# Patient Record
Sex: FEMALE | Race: WHITE | NOT HISPANIC OR LATINO | Employment: UNEMPLOYED | URBAN - NONMETROPOLITAN AREA
[De-identification: names, ages, dates, MRNs, and addresses within clinical notes are randomized per-mention and may not be internally consistent; named-entity substitution may affect disease eponyms.]

---

## 2017-01-02 ENCOUNTER — HOSPITAL ENCOUNTER (EMERGENCY)
Facility: HOSPITAL | Age: 47
Discharge: HOME | End: 2017-01-03
Attending: EMERGENCY MEDICINE | Admitting: EMERGENCY MEDICINE

## 2017-01-02 ENCOUNTER — APPOINTMENT (OUTPATIENT)
Dept: GENERAL RADIOLOGY | Facility: HOSPITAL | Age: 47
End: 2017-01-02

## 2017-01-02 DIAGNOSIS — K29.00 OTHER ACUTE GASTRITIS: Primary | ICD-10-CM

## 2017-01-02 DIAGNOSIS — R55 VASOVAGAL NEAR SYNCOPE: ICD-10-CM

## 2017-01-02 LAB
ALBUMIN SERPL-MCNC: 4.4 G/DL (ref 3.5–5)
ALBUMIN/GLOB SERPL: 1.2 G/DL (ref 1.5–2.5)
ALP SERPL-CCNC: 121 U/L (ref 46–116)
ALT SERPL W P-5'-P-CCNC: 18 U/L (ref 10–36)
AMPHET+METHAMPHET UR QL: NEGATIVE
AMYLASE SERPL-CCNC: 68 U/L (ref 28–100)
ANION GAP SERPL CALCULATED.3IONS-SCNC: 8.4 MMOL/L (ref 3.6–11.2)
AST SERPL-CCNC: 15 U/L (ref 10–30)
BARBITURATES UR QL SCN: NEGATIVE
BASOPHILS # BLD AUTO: 0.03 10*3/MM3 (ref 0–0.3)
BASOPHILS NFR BLD AUTO: 0.3 % (ref 0–2)
BENZODIAZ UR QL SCN: NEGATIVE
BILIRUB SERPL-MCNC: 0.2 MG/DL (ref 0.2–1.8)
BILIRUB UR QL STRIP: NEGATIVE
BUN BLD-MCNC: 16 MG/DL (ref 7–21)
BUN/CREAT SERPL: 22.9 (ref 7–25)
CALCIUM SPEC-SCNC: 10.2 MG/DL (ref 7.7–10)
CANNABINOIDS SERPL QL: NEGATIVE
CHLORIDE SERPL-SCNC: 104 MMOL/L (ref 99–112)
CK MB SERPL-CCNC: 0.29 NG/ML (ref 0–5)
CK MB SERPL-RTO: 0.4 % (ref 0–3)
CK SERPL-CCNC: 77 U/L (ref 24–173)
CLARITY UR: CLEAR
CO2 SERPL-SCNC: 27.6 MMOL/L (ref 24.3–31.9)
COCAINE UR QL: NEGATIVE
COLOR UR: YELLOW
CREAT BLD-MCNC: 0.7 MG/DL (ref 0.43–1.29)
DEPRECATED RDW RBC AUTO: 44.2 FL (ref 37–54)
DEVELOPER EXPIRATION DATE: NORMAL
DEVELOPER LOT NUMBER: NORMAL
EOSINOPHIL # BLD AUTO: 0.42 10*3/MM3 (ref 0–0.7)
EOSINOPHIL NFR BLD AUTO: 3.6 % (ref 0–5)
ERYTHROCYTE [DISTWIDTH] IN BLOOD BY AUTOMATED COUNT: 13.5 % (ref 11.5–14.5)
EXPIRATION DATE: NORMAL
FECAL OCCULT BLOOD SCREEN, POC: NORMAL
GFR SERPL CREATININE-BSD FRML MDRD: 91 ML/MIN/1.73
GLOBULIN UR ELPH-MCNC: 3.7 GM/DL
GLUCOSE BLD-MCNC: 208 MG/DL (ref 70–110)
GLUCOSE UR STRIP-MCNC: NEGATIVE MG/DL
HCT VFR BLD AUTO: 43.1 % (ref 37–47)
HGB BLD-MCNC: 13.8 G/DL (ref 12–16)
HGB UR QL STRIP.AUTO: NEGATIVE
IMM GRANULOCYTES # BLD: 0.02 10*3/MM3 (ref 0–0.03)
IMM GRANULOCYTES NFR BLD: 0.2 % (ref 0–0.5)
KETONES UR QL STRIP: NEGATIVE
LEUKOCYTE ESTERASE UR QL STRIP.AUTO: NEGATIVE
LIPASE SERPL-CCNC: 31 U/L (ref 13–60)
LYMPHOCYTES # BLD AUTO: 2.83 10*3/MM3 (ref 1–3)
LYMPHOCYTES NFR BLD AUTO: 24.5 % (ref 21–51)
Lab: NORMAL
MCH RBC QN AUTO: 29.6 PG (ref 27–33)
MCHC RBC AUTO-ENTMCNC: 32 G/DL (ref 33–37)
MCV RBC AUTO: 92.3 FL (ref 80–94)
METHADONE UR QL SCN: NEGATIVE
MONOCYTES # BLD AUTO: 0.89 10*3/MM3 (ref 0.1–0.9)
MONOCYTES NFR BLD AUTO: 7.7 % (ref 0–10)
MYOGLOBIN SERPL-MCNC: 21 NG/ML (ref 0–109)
NEGATIVE CONTROL: NEGATIVE
NEUTROPHILS # BLD AUTO: 7.34 10*3/MM3 (ref 1.4–6.5)
NEUTROPHILS NFR BLD AUTO: 63.7 % (ref 30–70)
NITRITE UR QL STRIP: NEGATIVE
OPIATES UR QL: NEGATIVE
OSMOLALITY SERPL CALC.SUM OF ELEC: 286.7 MOSM/KG (ref 273–305)
OXYCODONE UR QL SCN: NEGATIVE
PCP UR QL SCN: NEGATIVE
PH UR STRIP.AUTO: <=5 [PH] (ref 5–8)
PLATELET # BLD AUTO: 404 10*3/MM3 (ref 130–400)
PMV BLD AUTO: 11.1 FL (ref 6–10)
POSITIVE CONTROL: POSITIVE
POTASSIUM BLD-SCNC: 4.1 MMOL/L (ref 3.5–5.3)
PROPOXYPH UR QL: NEGATIVE
PROT SERPL-MCNC: 8.1 G/DL (ref 6–8)
PROT UR QL STRIP: NEGATIVE
RBC # BLD AUTO: 4.67 10*6/MM3 (ref 4.2–5.4)
SODIUM BLD-SCNC: 140 MMOL/L (ref 135–153)
SP GR UR STRIP: 1.02 (ref 1–1.03)
TROPONIN I SERPL-MCNC: <0.006 NG/ML
UROBILINOGEN UR QL STRIP: NORMAL
WBC NRBC COR # BLD: 11.53 10*3/MM3 (ref 4.5–12.5)

## 2017-01-02 PROCEDURE — 36415 COLL VENOUS BLD VENIPUNCTURE: CPT

## 2017-01-02 PROCEDURE — 99284 EMERGENCY DEPT VISIT MOD MDM: CPT

## 2017-01-02 PROCEDURE — 80307 DRUG TEST PRSMV CHEM ANLYZR: CPT | Performed by: EMERGENCY MEDICINE

## 2017-01-02 PROCEDURE — 82553 CREATINE MB FRACTION: CPT | Performed by: EMERGENCY MEDICINE

## 2017-01-02 PROCEDURE — 96374 THER/PROPH/DIAG INJ IV PUSH: CPT

## 2017-01-02 PROCEDURE — 80053 COMPREHEN METABOLIC PANEL: CPT | Performed by: EMERGENCY MEDICINE

## 2017-01-02 PROCEDURE — 82150 ASSAY OF AMYLASE: CPT | Performed by: EMERGENCY MEDICINE

## 2017-01-02 PROCEDURE — 82550 ASSAY OF CK (CPK): CPT | Performed by: EMERGENCY MEDICINE

## 2017-01-02 PROCEDURE — 93010 ELECTROCARDIOGRAM REPORT: CPT | Performed by: INTERNAL MEDICINE

## 2017-01-02 PROCEDURE — 83690 ASSAY OF LIPASE: CPT | Performed by: EMERGENCY MEDICINE

## 2017-01-02 PROCEDURE — 71010 HC CHEST PA OR AP: CPT

## 2017-01-02 PROCEDURE — 84484 ASSAY OF TROPONIN QUANT: CPT | Performed by: EMERGENCY MEDICINE

## 2017-01-02 PROCEDURE — 93005 ELECTROCARDIOGRAM TRACING: CPT | Performed by: EMERGENCY MEDICINE

## 2017-01-02 PROCEDURE — G0477 DRUG TEST PRESUMP OPTICAL: HCPCS | Performed by: EMERGENCY MEDICINE

## 2017-01-02 PROCEDURE — 83874 ASSAY OF MYOGLOBIN: CPT | Performed by: EMERGENCY MEDICINE

## 2017-01-02 PROCEDURE — 25010000002 ONDANSETRON PER 1 MG: Performed by: EMERGENCY MEDICINE

## 2017-01-02 PROCEDURE — 81003 URINALYSIS AUTO W/O SCOPE: CPT | Performed by: EMERGENCY MEDICINE

## 2017-01-02 PROCEDURE — 71010 XR CHEST 1 VW: CPT | Performed by: RADIOLOGY

## 2017-01-02 PROCEDURE — 96361 HYDRATE IV INFUSION ADD-ON: CPT

## 2017-01-02 PROCEDURE — 85025 COMPLETE CBC W/AUTO DIFF WBC: CPT | Performed by: EMERGENCY MEDICINE

## 2017-01-02 RX ORDER — SODIUM CHLORIDE 9 MG/ML
125 INJECTION, SOLUTION INTRAVENOUS ONCE
Status: COMPLETED | OUTPATIENT
Start: 2017-01-02 | End: 2017-01-03

## 2017-01-02 RX ORDER — ONDANSETRON 2 MG/ML
4 INJECTION INTRAMUSCULAR; INTRAVENOUS ONCE
Status: COMPLETED | OUTPATIENT
Start: 2017-01-02 | End: 2017-01-02

## 2017-01-02 RX ORDER — SODIUM CHLORIDE 0.9 % (FLUSH) 0.9 %
10 SYRINGE (ML) INJECTION AS NEEDED
Status: DISCONTINUED | OUTPATIENT
Start: 2017-01-02 | End: 2017-01-03 | Stop reason: HOSPADM

## 2017-01-02 RX ADMIN — SODIUM CHLORIDE 125 ML/HR: 9 INJECTION, SOLUTION INTRAVENOUS at 23:04

## 2017-01-02 RX ADMIN — ONDANSETRON 4 MG: 2 INJECTION, SOLUTION INTRAMUSCULAR; INTRAVENOUS at 20:42

## 2017-01-02 RX ADMIN — SODIUM CHLORIDE 500 ML: 9 INJECTION, SOLUTION INTRAVENOUS at 22:03

## 2017-01-02 RX ADMIN — SODIUM CHLORIDE 500 ML: 9 INJECTION, SOLUTION INTRAVENOUS at 20:37

## 2017-01-03 VITALS
WEIGHT: 270 LBS | SYSTOLIC BLOOD PRESSURE: 116 MMHG | RESPIRATION RATE: 20 BRPM | BODY MASS INDEX: 49.69 KG/M2 | TEMPERATURE: 98 F | HEART RATE: 84 BPM | OXYGEN SATURATION: 97 % | DIASTOLIC BLOOD PRESSURE: 63 MMHG | HEIGHT: 62 IN

## 2017-01-03 LAB
CK MB SERPL-CCNC: 0.2 NG/ML (ref 0–5)
CK MB SERPL-RTO: 0.3 % (ref 0–3)
CK SERPL-CCNC: 67 U/L (ref 24–173)
MYOGLOBIN SERPL-MCNC: 24 NG/ML (ref 0–109)
TROPONIN I SERPL-MCNC: 0.01 NG/ML

## 2017-01-03 RX ORDER — OMEPRAZOLE 20 MG/1
20 CAPSULE, DELAYED RELEASE ORAL DAILY
Qty: 30 CAPSULE | Refills: 0 | Status: SHIPPED | OUTPATIENT
Start: 2017-01-03 | End: 2017-08-07

## 2017-01-03 RX ORDER — PROMETHAZINE HYDROCHLORIDE 25 MG/1
25 SUPPOSITORY RECTAL EVERY 6 HOURS PRN
Qty: 12 SUPPOSITORY | Refills: 0 | Status: SHIPPED | OUTPATIENT
Start: 2017-01-03 | End: 2017-01-18

## 2017-01-03 RX ORDER — FAMOTIDINE 20 MG/1
20 TABLET, FILM COATED ORAL NIGHTLY
Qty: 30 TABLET | Refills: 0 | Status: SHIPPED | OUTPATIENT
Start: 2017-01-03 | End: 2017-01-18

## 2017-01-03 RX ORDER — ONDANSETRON 4 MG/1
4 TABLET, ORALLY DISINTEGRATING ORAL 4 TIMES DAILY
Qty: 15 TABLET | Refills: 0 | Status: SHIPPED | OUTPATIENT
Start: 2017-01-03 | End: 2017-01-19 | Stop reason: HOSPADM

## 2017-01-03 NOTE — ED NOTES
Pt reports that when she went to the bathroom that her stool looked dark and states she is concerned it might have been bloody. Dr Beltre made of aware of pts concerns. New orders noted     Danielle Goldstein RN  01/03/17 0812

## 2017-01-03 NOTE — ED PROVIDER NOTES
Subjective   HPI Comments: Patient comes in following a near syncopal event.  She was eating dinner out.  She had sudden severe onset mid abdominal pain and was very nauseated.  She became pale and diaphoretic.  She had near syncope.  She had pounding palpitations and chest tightness. Sometimes have almost completely resolved.  She has not had similar symptoms in the past.  She is currently on oral antibiotics (nitrofurantoin) for a UTI.    Patient is a 46 y.o. female presenting with abdominal pain.   History provided by:  Patient  Abdominal Pain   Pain quality: cramping, sharp and stabbing    Pain radiates to:  Does not radiate  Pain severity:  Severe  Onset quality:  Sudden  Progression:  Partially resolved  Chronicity:  New  Context: eating    Context: not alcohol use, not awakening from sleep, not diet changes, not laxative use, not medication withdrawal, not previous surgeries, not recent illness, not recent sexual activity, not recent travel, not retching, not sick contacts, not suspicious food intake and not trauma    Relieved by:  Nothing  Worsened by:  Eating  Ineffective treatments:  None tried  Associated symptoms: chest pain, nausea and vomiting    Associated symptoms: no anorexia, no belching, no chills, no constipation, no cough, no diarrhea, no dysuria, no fatigue, no fever, no flatus, no hematemesis, no hematochezia, no hematuria, no melena, no shortness of breath, no sore throat, no vaginal bleeding and no vaginal discharge    Risk factors: obesity    Risk factors: no alcohol abuse, no aspirin use, not elderly, has not had multiple surgeries, no NSAID use, not pregnant and no recent hospitalization        Review of Systems   Constitutional: Positive for diaphoresis. Negative for chills, fatigue and fever.   HENT: Negative.  Negative for sore throat.    Eyes: Negative.  Negative for visual disturbance.   Respiratory: Positive for chest tightness. Negative for cough and shortness of breath.     Cardiovascular: Positive for chest pain and palpitations. Negative for leg swelling.   Gastrointestinal: Positive for abdominal pain, nausea and vomiting. Negative for abdominal distention, anorexia, constipation, diarrhea, flatus, hematemesis, hematochezia and melena.   Endocrine: Negative.    Genitourinary: Negative.  Negative for dysuria, hematuria, vaginal bleeding and vaginal discharge.   Musculoskeletal: Negative.    Skin: Positive for color change.   Allergic/Immunologic: Negative.    Neurological: Positive for dizziness, weakness and light-headedness.   Hematological: Negative.    Psychiatric/Behavioral: Negative.    All other systems reviewed and are negative.      Past Medical History   Diagnosis Date   • Anxiety    • Arthritis    • Asthma    • Depression    • Depression    • Hyperlipidemia    • Hypertension    • Mitral valve regurgitation    • PONV (postoperative nausea and vomiting)    • Sleep apnea        Allergies   Allergen Reactions   • Amlodipine GI Intolerance and Arrhythmia   • Morphine And Related Itching   • Nifedipine GI Intolerance and Arrhythmia     Adalat, procardia   • Diazepam Anxiety   • Midazolam Anxiety   • Sulfa Antibiotics Rash       Past Surgical History   Procedure Laterality Date   •  section     • Appendectomy     • Foot surgery     • Hysterectomy     • [place] tooth extraction     • Mouth surgery     • Tubal abdominal ligation     • Diagnostic laparoscopy N/A 10/14/2016     Procedure: DIAGNOSTIC LAPAROSCOPY POSSIBLE RIGHT SALPINGOOPHORECTOMY;  Surgeon: Rory Owens DO;  Location: [facility];  Service:    • Trachelectomy N/A 10/14/2016     Procedure: TRACHELECTOMY VAGINAL;  Surgeon: Rory Owens DO;  Location: [facility] OR;  Service:    • Umbilical hernia repair N/A 10/14/2016     Procedure: UMBILICAL HERNIA REPAIR;  Surgeon: Spike Porter MD;  Location: [facility] OR;  Service:    • Colonoscopy     • Cardiac catheterization     • Umbilical hernia  repair N/A 12/9/2016     Procedure: UMBILICAL HERNIA REPAIR;  Surgeon: Spike Porter MD;  Location: [facility];  Service:        Family History   Problem Relation Age of Onset   • No Known Problems Mother    • No Known Problems Father    • No Known Problems Sister    • No Known Problems Brother    • No Known Problems Son    • No Known Problems Daughter    • No Known Problems Maternal Grandmother    • No Known Problems Maternal Grandfather    • No Known Problems Paternal Grandmother    • No Known Problems Paternal Grandfather    • No Known Problems Cousin    • Rheum arthritis Neg Hx    • Osteoarthritis Neg Hx    • Asthma Neg Hx    • Diabetes Neg Hx    • Heart failure Neg Hx    • Hyperlipidemia Neg Hx    • Hypertension Neg Hx    • Migraines Neg Hx    • Rashes / Skin problems Neg Hx    • Seizures Neg Hx    • Stroke Neg Hx    • Thyroid disease Neg Hx        Social History     Social History   • Marital status:      Spouse name: N/A   • Number of children: N/A   • Years of education: N/A     Social History Main Topics   • Smoking status: Former Smoker     Packs/day: 0.50     Years: 10.00     Quit date: 2014   • Smokeless tobacco: Never Used   • Alcohol use No      Comment: not often   • Drug use: No   • Sexual activity: Defer     Other Topics Concern   • None     Social History Narrative           Objective   Physical Exam   Constitutional: She is oriented to person, place, and time. She appears well-developed and well-nourished. No distress.   HENT:   Head: Normocephalic and atraumatic.   Nose: Nose normal.   oist mucous membranes, airway widely patent.   Eyes: Conjunctivae and EOM are normal. Pupils are equal, round, and reactive to light. Right eye exhibits no discharge. Left eye exhibits no discharge. No scleral icterus.   Neck: Normal range of motion. Neck supple. No tracheal deviation present.   Cardiovascular: Normal rate, regular rhythm, normal heart sounds and intact distal pulses.  Exam reveals no  gallop and no friction rub.    No murmur heard.  Pulmonary/Chest: Effort normal and breath sounds normal. No stridor. No respiratory distress. She has no wheezes. She has no rales. She exhibits no tenderness.   Abdominal: Soft. Bowel sounds are normal. She exhibits no distension and no mass. There is no tenderness. There is no guarding.   Musculoskeletal: Normal range of motion. She exhibits no tenderness or deformity.   Neurological: She is alert and oriented to person, place, and time. She exhibits normal muscle tone. Coordination normal.   Skin: Skin is warm and dry. No pallor.   Psychiatric: Her behavior is normal. Judgment and thought content normal.   anxious   Nursing note and vitals reviewed.      Procedures         ED Course  ED Course   Value Comment By Time   ECG 12 Lead 12-lead EKG performed at 2006 hrs.  Interpreted at 2006 hrs.  Sinus rhythm.  95 bpm.  IL interval 157.  QRS duration 89.  .  No pathologic blocks.  No apparent dysrhythmia.  No ST segment deviation diagnostic for acute ischemia/infarct. Ashish Beltre MD 01/03 0018    Patient hemodynamically stable and neurologically intact while in the emergency department.  No results diagnostic for acute coronary syndrome. Ashish Beltre MD 01/03 0025      XR Chest 1 View   ED Interpretation   Single portable AP chest   My read   No apparent acute disease.        Labs Reviewed   COMPREHENSIVE METABOLIC PANEL - Abnormal; Notable for the following:        Result Value    Glucose 208 (*)     Calcium 10.2 (*)     Total Protein 8.1 (*)     Alkaline Phosphatase 121 (*)     A/G Ratio 1.2 (*)     All other components within normal limits   CBC WITH AUTO DIFFERENTIAL - Abnormal; Notable for the following:     MCHC 32.0 (*)     MPV 11.1 (*)     Platelets 404 (*)     Neutrophils, Absolute 7.34 (*)     All other components within normal limits   AMYLASE - Normal   LIPASE - Normal   URINALYSIS W/ CULTURE IF INDICATED - Normal    Narrative:      Urine microscopic not indicated.   CK - Normal   MYOGLOBIN, SERUM - Normal   CK MB - Normal   TROPONIN (IN-HOUSE) - Normal    Narrative:     Ultra Troponin I Reference Range:         <=0.039 ng/mL: Negative    0.04-0.779 ng/mL: Indeterminate Range. Suspicious of MI.  Clinical correlation required.       >=0.78  ng/mL: Consistent with myocardial injury.  Clinical correlation required.   URINE DRUG SCREEN - Normal    Narrative:     Negative Thresholds For Drugs Screened:                  Amphetamines              1000 ng/ml               Barbiturates               200 ng/ml               Benzodiazepines            200 ng/ml              Cocaine                    300 ng/ml              Methadone                  300 ng/ml              Opiates                    300 ng/ml               Phencyclidine               25 ng/ml               Propoxyphene               300 ng/ml              THC                         50 ng/ml    The reference range for all drugs tested is negative. This report includes final unconfirmed qualitative results to be used for medical treatment purposes only. Unconfirmed results must not be used for non-medical purposes such as employment or legal testing. Clinical consideration should be applied to any drug of abuse test, especially when unconfirmed quantitative results are used.     OXYCODONE, URINE - Normal    Narrative:     Oxycodone Screen Detects:    Oxycodone          100 ng/mL    Hydrocodone       1562 ng/mL    Codeine          13432 ng/mL    Dihydrocodeine   31487 ng/mL    Ethylmorphine    22890 ng/mL    Hydromorphone    89381 ng/mL    Oxymorphone      1562  ng/mL    [place]         58927 ng/mL   OSMOLALITY, CALCULATED - Normal   CKMB INDEX CALCULATION - Normal   CK - Normal   MYOGLOBIN, SERUM - Normal   CK MB - Normal   TROPONIN (IN-HOUSE) - Normal    Narrative:     Ultra Troponin I Reference Range:         <=0.039 ng/mL: Negative    0.04-0.779 ng/mL: Indeterminate Range. Suspicious of  MI.  Clinical correlation required.       >=0.78  ng/mL: Consistent with myocardial injury.  Clinical correlation required.   CKMB INDEX CALCULATION - Normal   POCT OCCULT BLOOD STOOL - Normal   CBC AND DIFFERENTIAL    Narrative:     The following orders were created for panel order CBC & Differential.  Procedure                               Abnormality         Status                     ---------                               -----------         ------                     CBC Auto Differential[[id number]]         Abnormal            Final result                 Please view results for these tests on the individual orders.        Medication List      START taking these medications          famotidine 20 MG tablet   Commonly known as:  PEPCID   Take 1 tablet by mouth Every Night.       omeprazole 20 MG capsule   Commonly known as:  priLOSEC   Take 1 capsule by mouth Daily.       ondansetron ODT 4 MG disintegrating tablet   Commonly known as:  ZOFRAN-ODT   Take 1 tablet by mouth 4 (Four) Times a Day.       promethazine 25 MG suppository   Commonly known as:  PHENERGAN   Insert 1 suppository into the rectum Every 6 (Six) Hours As Needed for   vomiting.         CONTINUE taking these medications          albuterol 108 (90 BASE) MCG/ACT inhaler   Commonly known as:  PROVENTIL HFA;VENTOLIN HFA       aspirin 81 MG EC tablet       atorvastatin 20 MG tablet   Commonly known as:  LIPITOR       cetirizine 10 MG tablet   Commonly known as:  zyrTEC       FLUoxetine 20 MG capsule   Commonly known as:  PROzac       ibuprofen 800 MG tablet   Commonly known as:  ADVIL,MOTRIN   Take 1 tablet by mouth 3 (Three) Times a Day.       lisinopril 20 MG tablet   Commonly known as:  PRINIVIL,ZESTRIL       lisinopril-hydrochlorothiazide 20-25 MG per tablet   Commonly known as:  PRINZIDE,ZESTORETIC       metoprolol succinate XL 50 MG 24 hr tablet   Commonly known as:  TOPROL-XL       montelukast 10 MG tablet   Commonly known as:  SINGULAIR        nitrofurantoin (macrocrystal-monohydrate) 100 MG capsule   Commonly known as:  MACROBID   Take 1 capsule by mouth 2 (Two) Times a Day.       phenazopyridine 200 MG tablet   Commonly known as:  PYRIDIUM   Take 1 tablet by mouth 3 (Three) Times a Day As Needed for bladder spasms.         simvastatin 20 MG tablet   Commonly known as:  ZOCOR                     MDM  Number of Diagnoses or Management Options  Other acute gastritis: new and requires workup  Vasovagal near syncope: new and requires workup     Amount and/or Complexity of Data Reviewed  Clinical lab tests: ordered and reviewed  Independent visualization of images, tracings, or specimens: yes    Risk of Complications, Morbidity, and/or Mortality  Presenting problems: high  Diagnostic procedures: moderate  Management options: moderate    Patient Progress  Patient progress: stable      Final diagnoses:   Other acute gastritis   Vasovagal near syncope            Ashish Beltre MD  01/03/17 0031

## 2017-01-18 ENCOUNTER — APPOINTMENT (OUTPATIENT)
Dept: GENERAL RADIOLOGY | Facility: HOSPITAL | Age: 47
End: 2017-01-18

## 2017-01-18 ENCOUNTER — HOSPITAL ENCOUNTER (OUTPATIENT)
Facility: HOSPITAL | Age: 47
Setting detail: OBSERVATION
Discharge: HOME | End: 2017-01-19
Attending: EMERGENCY MEDICINE | Admitting: INTERNAL MEDICINE

## 2017-01-18 DIAGNOSIS — R07.9 CHEST PAIN, UNSPECIFIED TYPE: Primary | ICD-10-CM

## 2017-01-18 LAB
ALBUMIN SERPL-MCNC: 4.4 G/DL (ref 3.5–5)
ALBUMIN/GLOB SERPL: 1.3 G/DL (ref 1.5–2.5)
ALP SERPL-CCNC: 100 U/L (ref 46–116)
ALT SERPL W P-5'-P-CCNC: 22 U/L (ref 10–36)
ANION GAP SERPL CALCULATED.3IONS-SCNC: 9 MMOL/L (ref 3.6–11.2)
AST SERPL-CCNC: 22 U/L (ref 10–30)
BASOPHILS # BLD AUTO: 0.02 10*3/MM3 (ref 0–0.3)
BASOPHILS NFR BLD AUTO: 0.2 % (ref 0–2)
BILIRUB SERPL-MCNC: 0.3 MG/DL (ref 0.2–1.8)
BUN BLD-MCNC: 11 MG/DL (ref 7–21)
BUN/CREAT SERPL: 17.2 (ref 7–25)
CALCIUM SPEC-SCNC: 9.6 MG/DL (ref 7.7–10)
CHLORIDE SERPL-SCNC: 102 MMOL/L (ref 99–112)
CK MB SERPL-CCNC: 0.31 NG/ML (ref 0–5)
CK MB SERPL-RTO: 0.4 % (ref 0–3)
CK SERPL-CCNC: 76 U/L (ref 24–173)
CO2 SERPL-SCNC: 28 MMOL/L (ref 24.3–31.9)
CREAT BLD-MCNC: 0.64 MG/DL (ref 0.43–1.29)
DEPRECATED RDW RBC AUTO: 44.2 FL (ref 37–54)
EOSINOPHIL # BLD AUTO: 0.31 10*3/MM3 (ref 0–0.7)
EOSINOPHIL NFR BLD AUTO: 3 % (ref 0–5)
ERYTHROCYTE [DISTWIDTH] IN BLOOD BY AUTOMATED COUNT: 13.5 % (ref 11.5–14.5)
GFR SERPL CREATININE-BSD FRML MDRD: 101 ML/MIN/1.73
GLOBULIN UR ELPH-MCNC: 3.4 GM/DL
GLUCOSE BLD-MCNC: 132 MG/DL (ref 70–110)
HCT VFR BLD AUTO: 41.1 % (ref 37–47)
HGB BLD-MCNC: 13.8 G/DL (ref 12–16)
HOLD SPECIMEN: NORMAL
HOLD SPECIMEN: NORMAL
IMM GRANULOCYTES # BLD: 0.03 10*3/MM3 (ref 0–0.03)
IMM GRANULOCYTES NFR BLD: 0.3 % (ref 0–0.5)
LYMPHOCYTES # BLD AUTO: 2.83 10*3/MM3 (ref 1–3)
LYMPHOCYTES NFR BLD AUTO: 27 % (ref 21–51)
MCH RBC QN AUTO: 30.4 PG (ref 27–33)
MCHC RBC AUTO-ENTMCNC: 33.6 G/DL (ref 33–37)
MCV RBC AUTO: 90.5 FL (ref 80–94)
MONOCYTES # BLD AUTO: 0.94 10*3/MM3 (ref 0.1–0.9)
MONOCYTES NFR BLD AUTO: 9 % (ref 0–10)
NEUTROPHILS # BLD AUTO: 6.34 10*3/MM3 (ref 1.4–6.5)
NEUTROPHILS NFR BLD AUTO: 60.5 % (ref 30–70)
OSMOLALITY SERPL CALC.SUM OF ELEC: 278.8 MOSM/KG (ref 273–305)
PLATELET # BLD AUTO: 374 10*3/MM3 (ref 130–400)
PMV BLD AUTO: 11.1 FL (ref 6–10)
POTASSIUM BLD-SCNC: 4 MMOL/L (ref 3.5–5.3)
PROT SERPL-MCNC: 7.8 G/DL (ref 6–8)
RBC # BLD AUTO: 4.54 10*6/MM3 (ref 4.2–5.4)
SODIUM BLD-SCNC: 139 MMOL/L (ref 135–153)
TROPONIN I SERPL-MCNC: <0.006 NG/ML
WBC NRBC COR # BLD: 10.47 10*3/MM3 (ref 4.5–12.5)
WHOLE BLOOD HOLD SPECIMEN: NORMAL

## 2017-01-18 PROCEDURE — 96374 THER/PROPH/DIAG INJ IV PUSH: CPT

## 2017-01-18 PROCEDURE — 84484 ASSAY OF TROPONIN QUANT: CPT | Performed by: EMERGENCY MEDICINE

## 2017-01-18 PROCEDURE — G0378 HOSPITAL OBSERVATION PER HR: HCPCS

## 2017-01-18 PROCEDURE — 71010 XR CHEST 1 VW: CPT | Performed by: RADIOLOGY

## 2017-01-18 PROCEDURE — 99284 EMERGENCY DEPT VISIT MOD MDM: CPT

## 2017-01-18 PROCEDURE — 93005 ELECTROCARDIOGRAM TRACING: CPT

## 2017-01-18 PROCEDURE — 25010000002 ONDANSETRON PER 1 MG: Performed by: EMERGENCY MEDICINE

## 2017-01-18 PROCEDURE — 84484 ASSAY OF TROPONIN QUANT: CPT | Performed by: PHYSICIAN ASSISTANT

## 2017-01-18 PROCEDURE — 96372 THER/PROPH/DIAG INJ SC/IM: CPT

## 2017-01-18 PROCEDURE — 84484 ASSAY OF TROPONIN QUANT: CPT | Performed by: INTERNAL MEDICINE

## 2017-01-18 PROCEDURE — 71010 HC CHEST PA OR AP: CPT

## 2017-01-18 PROCEDURE — 80053 COMPREHEN METABOLIC PANEL: CPT | Performed by: PHYSICIAN ASSISTANT

## 2017-01-18 PROCEDURE — 82553 CREATINE MB FRACTION: CPT | Performed by: EMERGENCY MEDICINE

## 2017-01-18 PROCEDURE — 25010000002 ENOXAPARIN PER 10 MG: Performed by: INTERNAL MEDICINE

## 2017-01-18 PROCEDURE — 93010 ELECTROCARDIOGRAM REPORT: CPT | Performed by: INTERNAL MEDICINE

## 2017-01-18 PROCEDURE — 82550 ASSAY OF CK (CPK): CPT | Performed by: EMERGENCY MEDICINE

## 2017-01-18 PROCEDURE — 85025 COMPLETE CBC W/AUTO DIFF WBC: CPT | Performed by: PHYSICIAN ASSISTANT

## 2017-01-18 RX ORDER — MONTELUKAST SODIUM 10 MG/1
10 TABLET ORAL DAILY
Status: DISCONTINUED | OUTPATIENT
Start: 2017-01-19 | End: 2017-01-19 | Stop reason: HOSPADM

## 2017-01-18 RX ORDER — FLUOXETINE HYDROCHLORIDE 20 MG/1
40 CAPSULE ORAL 2 TIMES DAILY
Status: DISCONTINUED | OUTPATIENT
Start: 2017-01-18 | End: 2017-01-19 | Stop reason: HOSPADM

## 2017-01-18 RX ORDER — CETIRIZINE HYDROCHLORIDE 10 MG/1
10 TABLET ORAL DAILY
Status: DISCONTINUED | OUTPATIENT
Start: 2017-01-19 | End: 2017-01-19 | Stop reason: HOSPADM

## 2017-01-18 RX ORDER — ASPIRIN 81 MG/1
81 TABLET ORAL DAILY
Status: DISCONTINUED | OUTPATIENT
Start: 2017-01-18 | End: 2017-01-19 | Stop reason: HOSPADM

## 2017-01-18 RX ORDER — ALBUTEROL SULFATE 2.5 MG/3ML
2.5 SOLUTION RESPIRATORY (INHALATION) EVERY 4 HOURS PRN
Status: DISCONTINUED | OUTPATIENT
Start: 2017-01-18 | End: 2017-01-19 | Stop reason: HOSPADM

## 2017-01-18 RX ORDER — METOPROLOL SUCCINATE 50 MG/1
50 TABLET, EXTENDED RELEASE ORAL DAILY
Status: CANCELLED | OUTPATIENT
Start: 2017-01-19

## 2017-01-18 RX ORDER — IBUPROFEN 800 MG/1
800 TABLET ORAL 3 TIMES DAILY
Status: CANCELLED | OUTPATIENT
Start: 2017-01-18

## 2017-01-18 RX ORDER — LISINOPRIL 10 MG/1
40 TABLET ORAL
Status: DISCONTINUED | OUTPATIENT
Start: 2017-01-18 | End: 2017-01-19 | Stop reason: HOSPADM

## 2017-01-18 RX ORDER — ATORVASTATIN CALCIUM 20 MG/1
20 TABLET, FILM COATED ORAL DAILY
Status: CANCELLED | OUTPATIENT
Start: 2017-01-19

## 2017-01-18 RX ORDER — ONDANSETRON 4 MG/1
4 TABLET, ORALLY DISINTEGRATING ORAL 4 TIMES DAILY
Status: DISCONTINUED | OUTPATIENT
Start: 2017-01-18 | End: 2017-01-19 | Stop reason: HOSPADM

## 2017-01-18 RX ORDER — SODIUM CHLORIDE 0.9 % (FLUSH) 0.9 %
10 SYRINGE (ML) INJECTION AS NEEDED
Status: DISCONTINUED | OUTPATIENT
Start: 2017-01-18 | End: 2017-01-19 | Stop reason: HOSPADM

## 2017-01-18 RX ORDER — ATORVASTATIN CALCIUM 20 MG/1
20 TABLET, FILM COATED ORAL NIGHTLY
Status: DISCONTINUED | OUTPATIENT
Start: 2017-01-18 | End: 2017-01-19 | Stop reason: HOSPADM

## 2017-01-18 RX ORDER — ACETAMINOPHEN 325 MG/1
650 TABLET ORAL EVERY 4 HOURS PRN
Status: DISCONTINUED | OUTPATIENT
Start: 2017-01-18 | End: 2017-01-19 | Stop reason: HOSPADM

## 2017-01-18 RX ORDER — ASPIRIN 81 MG/1
324 TABLET, CHEWABLE ORAL ONCE
Status: COMPLETED | OUTPATIENT
Start: 2017-01-18 | End: 2017-01-18

## 2017-01-18 RX ORDER — ASPIRIN 81 MG/1
81 TABLET ORAL DAILY
Status: CANCELLED | OUTPATIENT
Start: 2017-01-19

## 2017-01-18 RX ORDER — ONDANSETRON 2 MG/ML
4 INJECTION INTRAMUSCULAR; INTRAVENOUS ONCE
Status: COMPLETED | OUTPATIENT
Start: 2017-01-18 | End: 2017-01-18

## 2017-01-18 RX ORDER — LISINOPRIL 10 MG/1
20 TABLET ORAL DAILY
Status: CANCELLED | OUTPATIENT
Start: 2017-01-19

## 2017-01-18 RX ORDER — ACETAMINOPHEN 325 MG/1
TABLET ORAL
Status: COMPLETED
Start: 2017-01-18 | End: 2017-01-18

## 2017-01-18 RX ORDER — LISINOPRIL AND HYDROCHLOROTHIAZIDE 25; 20 MG/1; MG/1
1 TABLET ORAL DAILY
Status: CANCELLED | OUTPATIENT
Start: 2017-01-19

## 2017-01-18 RX ORDER — PANTOPRAZOLE SODIUM 40 MG/1
40 TABLET, DELAYED RELEASE ORAL
Status: DISCONTINUED | OUTPATIENT
Start: 2017-01-19 | End: 2017-01-19 | Stop reason: HOSPADM

## 2017-01-18 RX ADMIN — ACETAMINOPHEN 650 MG: 325 TABLET, FILM COATED ORAL at 18:40

## 2017-01-18 RX ADMIN — ASPIRIN 81 MG: 81 TABLET ORAL at 18:31

## 2017-01-18 RX ADMIN — NITROGLYCERIN 1 INCH: 20 OINTMENT TOPICAL at 14:28

## 2017-01-18 RX ADMIN — ONDANSETRON 4 MG: 2 INJECTION, SOLUTION INTRAMUSCULAR; INTRAVENOUS at 15:56

## 2017-01-18 RX ADMIN — ASPIRIN 324 MG: 81 TABLET, CHEWABLE ORAL at 14:25

## 2017-01-18 RX ADMIN — ENOXAPARIN SODIUM 40 MG: 40 INJECTION SUBCUTANEOUS at 21:13

## 2017-01-18 NOTE — ED NOTES
Pt reports that she has been having a pressure sensation in her left chest.  Pt family at bedside.     Gely Patino RN  01/18/17 9497

## 2017-01-18 NOTE — ED PROVIDER NOTES
Subjective   Patient is a 46 y.o. female presenting with chest pain.   History provided by:  Spouse, patient and parent  History limited by:  Acuity of condition   used: No    Chest Pain   Pain location:  Substernal area  Pain quality: aching and pressure    Pain radiates to:  Does not radiate  Pain severity:  Mild  Onset quality:  Sudden  Timing:  Intermittent  Progression:  Worsening  Chronicity:  New  Context: movement    Relieved by:  None tried  Worsened by:  Exertion  Ineffective treatments:  None tried  Associated symptoms: heartburn and nausea    Associated symptoms: no abdominal pain and no fever    Risk factors: high cholesterol, hypertension, obesity and smoking        Review of Systems   Constitutional: Negative.  Negative for fever.   HENT: Negative.    Respiratory: Negative.    Cardiovascular: Positive for chest pain.   Gastrointestinal: Positive for heartburn and nausea. Negative for abdominal pain.   Endocrine: Negative.    Genitourinary: Negative.  Negative for dysuria.   Skin: Negative.    Neurological: Negative.    Psychiatric/Behavioral: Negative.    All other systems reviewed and are negative.      Past Medical History   Diagnosis Date   • Anxiety    • Arthritis    • Asthma    • Depression    • Depression    • Hyperlipidemia    • Hypertension    • Mitral valve regurgitation    • PONV (postoperative nausea and vomiting)    • Sleep apnea        Allergies   Allergen Reactions   • Amlodipine GI Intolerance and Arrhythmia   • Morphine And Related Itching   • Nifedipine GI Intolerance and Arrhythmia     Adalat, procardia   • Diazepam Anxiety   • Midazolam Anxiety   • Sulfa Antibiotics Rash       Past Surgical History   Procedure Laterality Date   •  section     • Appendectomy     • Foot surgery     • Hysterectomy     • [place] tooth extraction     • Mouth surgery     • Tubal abdominal ligation     • Diagnostic laparoscopy N/A 10/14/2016     Procedure: DIAGNOSTIC  LAPAROSCOPY POSSIBLE RIGHT SALPINGOOPHORECTOMY;  Surgeon: Rory Owens DO;  Location:  [facility] OR;  Service:    • Trachelectomy N/A 10/14/2016     Procedure: TRACHELECTOMY VAGINAL;  Surgeon: Rory Owens DO;  Location:  [facility] OR;  Service:    • Umbilical hernia repair N/A 10/14/2016     Procedure: UMBILICAL HERNIA REPAIR;  Surgeon: Spike Porter MD;  Location:  [facility] OR;  Service:    • Colonoscopy  2016   • Cardiac catheterization     • Umbilical hernia repair N/A 12/9/2016     Procedure: UMBILICAL HERNIA REPAIR;  Surgeon: Spike Porter MD;  Location:  [facility] OR;  Service:        Family History   Problem Relation Age of Onset   • No Known Problems Mother    • No Known Problems Father    • No Known Problems Sister    • No Known Problems Brother    • No Known Problems Son    • No Known Problems Daughter    • No Known Problems Maternal Grandmother    • No Known Problems Maternal Grandfather    • No Known Problems Paternal Grandmother    • No Known Problems Paternal Grandfather    • No Known Problems Cousin    • Rheum arthritis Neg Hx    • Osteoarthritis Neg Hx    • Asthma Neg Hx    • Diabetes Neg Hx    • Heart failure Neg Hx    • Hyperlipidemia Neg Hx    • Hypertension Neg Hx    • Migraines Neg Hx    • Rashes / Skin problems Neg Hx    • Seizures Neg Hx    • Stroke Neg Hx    • Thyroid disease Neg Hx        Social History     Social History   • Marital status:      Spouse name: N/A   • Number of children: N/A   • Years of education: N/A     Social History Main Topics   • Smoking status: Former Smoker     Packs/day: 0.50     Years: 10.00     Quit date: 2014   • Smokeless tobacco: Never Used   • Alcohol use No      Comment: not often   • Drug use: No   • Sexual activity: Defer     Other Topics Concern   • None     Social History Narrative           Objective   Physical Exam   Constitutional: She is oriented to person, place, and time. She appears well-developed and well-nourished. No distress.    HENT:   Head: Normocephalic and atraumatic.   Right Ear: External ear normal.   Left Ear: External ear normal.   Nose: Nose normal.   Eyes: Conjunctivae and EOM are normal. Pupils are equal, round, and reactive to light.   Neck: Normal range of motion. Neck supple. No JVD present. No tracheal deviation present.   Cardiovascular: Normal rate, regular rhythm and normal heart sounds.    No murmur heard.  Pulmonary/Chest: Effort normal and breath sounds normal. No respiratory distress. She has no wheezes.   Abdominal: Soft. Bowel sounds are normal. There is no tenderness.   Musculoskeletal: Normal range of motion. She exhibits no edema or deformity.   Neurological: She is alert and oriented to person, place, and time. No cranial nerve deficit.   Skin: Skin is warm and dry. No rash noted. She is not diaphoretic. No erythema. No pallor.   Psychiatric: She has a normal mood and affect. Her behavior is normal. Thought content normal.   Nursing note and vitals reviewed.      Procedures         ED Course  ED Course   Value Comment By Time   ECG 12 Lead Sinus rhythm rate of 81.  Wavy baseline no acute ischemia Spike Baker MD 01/18 1625      Discussed with Dr. Samson boykin put in observation      HEART Score  History: Slightly suspicious (+0)  ECG: Non specific repolarization disturbance (+1)  Age: Less than 45 (+0)  Risk Factors: 1 - 2 risk factors (+1)  Troponin: Normal limit or lower (+0)  Total: 2         MDM    Final diagnoses:   Chest pain, unspecified type            Spike Baker MD  01/18/17 [id number]

## 2017-01-18 NOTE — Clinical Note
Level of Care: Telemetry [5]   Admitting Physician: GRACY CHRISTOPHER [[id number]]   Attending Physician: GRACY CHRISTOPHER [[id number]]   Patient Class: Observation [104]

## 2017-01-18 NOTE — IP AVS SNAPSHOT
AFTER VISIT SUMMARY             Shaun Morales           About your hospitalization     You were admitted on:  January 18, 2017 You last received care in the:  [address]       Procedures & Surgeries         Medications    If you or your caregiver advised us that you are currently taking a medication and that medication is marked below as “Resume”, this simply indicates that we have reviewed those medications to make sure our new therapy recommendations do not interfere.  If you have concerns about medications other than those new ones which we are prescribing today, please consult the physician who prescribed them (or your primary physician).  Our review of your home medications is not meant to indicate that we are directing their use.             Your Medications      CONTINUE taking these medications     albuterol 108 (90 BASE) MCG/ACT inhaler   Inhale 2 puffs every 4 (four) hours as needed for wheezing   Commonly known as:  PROVENTIL HFA;VENTOLIN HFA           aspirin 81 MG EC tablet   Take 81 mg by mouth Daily.   Last time this was given:  1/19/2017 12:19 PM           atorvastatin 20 MG tablet   Take 20 mg by mouth Daily.   Commonly known as:  LIPITOR           cetirizine 10 MG tablet   Take 10 mg by mouth daily   Last time this was given:  1/19/2017 12:18 PM   Commonly known as:  zyrTEC           FLUoxetine 20 MG capsule   Take 40 mg by mouth 2 (two) times a day   Last time this was given:  1/19/2017  4:43 PM   Commonly known as:  PROzac           ibuprofen 800 MG tablet   Take 1 tablet by mouth 3 (Three) Times a Day.   Commonly known as:  ADVIL,MOTRIN           lisinopril-hydrochlorothiazide 20-25 MG per tablet   Take 1 tablet by mouth Daily. Takes with Lisinopril 20mg. Total of 40 mg or lisinopril daily.   Commonly known as:  PRINZIDE,ZESTORETIC           metoprolol succinate XL 50 MG 24 hr tablet   Take 50 mg by mouth daily   Commonly known as:  TOPROL-XL           montelukast 10 MG  tablet   Take 10 mg by mouth Daily.   Last time this was given:  1/19/2017 12:18 PM   Commonly known as:  SINGULAIR           omeprazole 20 MG capsule   Take 1 capsule by mouth Daily.   Commonly known as:  priLOSEC             STOP taking these medications     lisinopril 20 MG tablet   Commonly known as:  PRINIVIL,ZESTRIL           ondansetron ODT 4 MG disintegrating tablet   Commonly known as:  ZOFRAN-ODT                      Your Medications      Your Medication List           Morning Noon Evening Bedtime As Needed    albuterol 108 (90 BASE) MCG/ACT inhaler   Inhale 2 puffs every 4 (four) hours as needed for wheezing   Commonly known as:  PROVENTIL HFA;VENTOLIN HFA                                aspirin 81 MG EC tablet   Take 81 mg by mouth Daily.                                   atorvastatin 20 MG tablet   Take 20 mg by mouth Daily.   Commonly known as:  LIPITOR                                   cetirizine 10 MG tablet   Take 10 mg by mouth daily   Commonly known as:  zyrTEC                                   FLUoxetine 20 MG capsule   Take 40 mg by mouth 2 (two) times a day   Commonly known as:  PROzac                                      ibuprofen 800 MG tablet   Take 1 tablet by mouth 3 (Three) Times a Day.   Commonly known as:  ADVIL,MOTRIN                                         lisinopril-hydrochlorothiazide 20-25 MG per tablet   Take 1 tablet by mouth Daily. Takes with Lisinopril 20mg. Total of 40 mg or lisinopril daily.   Commonly known as:  PRINZIDE,ZESTORETIC                                   metoprolol succinate XL 50 MG 24 hr tablet   Take 50 mg by mouth daily   Commonly known as:  TOPROL-XL                                   montelukast 10 MG tablet   Take 10 mg by mouth Daily.   Commonly known as:  SINGULAIR                                   omeprazole 20 MG capsule   Take 1 capsule by mouth Daily.   Commonly known as:  priLOSEC                                            Instructions for After  Discharge        Activity Instructions     Activity as Tolerated               Additional Activity Instructions:      As Tolerated               Diet Instructions     Diet: Cardiac; Thin Liquids, No Restrictions       Discharge Diet:  Cardiac   Fluid Consistency:  Thin Liquids, No Restrictions               Cardiac               Discharge References/Attachments     CHEST PAIN OBSERVATION (ENGLISH)    ELECTROCARDIOGRAPHY (ENGLISH)    EXERCISE STRESS ELECTROCARDIOGRAM (ENGLISH)    CARDIAC DIET (ENGLISH)       Follow-ups for After Discharge        Follow-up Information     Follow up with PALOMO Jackson .    Specialty:  Family Medicine    Contact information:    [place] [id number] [phone]        Referrals and Follow-ups to Schedule     Follow-Up    As directed    In 2 wks   Follow Up Details:  Thom CULVER             [employer] Signup     [employer] allows you to send messages to your doctor, view your test results, renew your prescriptions, schedule appointments, and more. To sign up, go to [employer] and click on the Sign Up Now link in the New User? box. Enter your [employer] Activation Code exactly as it appears below along with the last four digits of your Social Security Number and your Date of Birth () to complete the sign-up process. If you do not sign up before the expiration date, you must request a new code.    [employer] Activation Code: [email or web address]  Expires: 2017  1:59 PM    If you have questions, you can email [email or web address] or call [phone] to talk to our [employer] staff. Remember, [employer] is NOT to be used for urgent needs. For medical emergencies, dial 911.           Summary of Your Hospitalization        Reason for Hospitalization     Your primary diagnosis was:  Not on File    Your diagnoses also included:  Chest Pain      Care Providers     Provider Service Role Specialty    Maureen Davies MD Cardiology Attending  Provider Cardiology    Maureen Davies MD Cardiology Consulting Physician  Cardiology      Your Allergies  Date Reviewed: 1/19/2017    Allergen Reactions    Amlodipine GI Intolerance  Arrhythmia         Morphine And Related Itching         Nifedipine GI Intolerance  Arrhythmia    Adalat, procardia         Diazepam Anxiety         Midazolam Anxiety         Sulfa Antibiotics Rash      Patient Belongings Returned     Document Return of Belongings Flowsheet     Were the patient bedside belongings sent home?   N/A   Belongings Retrieved from Security & Sent Home   N/A    Belongings Sent to Safe   --   Medications Retrieved from Pharmacy & Sent Home   No (Comment)              More Information      Chest Pain Observation  It is often hard to give a specific diagnosis for the cause of chest pain. Among other possibilities your symptoms might be caused by inadequate oxygen delivery to your heart (angina). Angina that is not treated or evaluated can lead to a heart attack (myocardial infarction) or death.  Blood tests, electrocardiograms, and X-rays may have been done to help determine a possible cause of your chest pain. After evaluation and observation, your health care provider has determined that it is unlikely your pain was caused by an unstable condition that requires hospitalization. However, a full evaluation of your pain may need to be completed, with additional diagnostic testing as directed. It is very important to keep your follow-up appointments. Not keeping your follow-up appointments could result in permanent heart damage, disability, or death. If there is any problem keeping your follow-up appointments, you must call your health care provider.  HOME CARE INSTRUCTIONS   Due to the slight chance that your pain could be angina, it is important to follow your health care provider's treatment plan and also maintain a healthy lifestyle:  · Maintain or work toward achieving a healthy weight.  · Stay physically  active and exercise regularly.  · Decrease your salt intake.  · Eat a balanced, healthy diet. Talk to a dietitian to learn about heart-healthy foods.  · Increase your fiber intake by including whole grains, vegetables, fruits, and nuts in your diet.  · Avoid situations that cause stress, anger, or depression.  · Take medicines as advised by your health care provider. Report any side effects to your health care provider. Do not stop medicines or adjust the dosages on your own.  · Quit smoking. Do not use nicotine patches or gum until you check with your health care provider.  · Keep your blood pressure, blood sugar, and cholesterol levels within normal limits.  · Limit alcohol intake to no more than 1 drink per day for women who are not pregnant and 2 drinks per day for men.  · Do not abuse drugs.  SEEK IMMEDIATE MEDICAL CARE IF:  You have severe chest pain or pressure which may include symptoms such as:  · You feel pain or pressure in your arms, neck, jaw, or back.  · You have severe back or abdominal pain, feel sick to your stomach (nauseous), or throw up (vomit).  · You are sweating profusely.  · You are having a fast or irregular heartbeat.  · You feel short of breath while at rest.  · You notice increasing shortness of breath during rest, sleep, or with activity.  · You have chest pain that does not get better after rest or after taking your usual medicine.  · You wake from sleep with chest pain.  · You are unable to sleep because you cannot breathe.  · You develop a frequent cough or you are coughing up blood.  · You feel dizzy, faint, or experience extreme fatigue.  · You develop severe weakness, dizziness, fainting, or chills.  Any of these symptoms may represent a serious problem that is an emergency. Do not wait to see if the symptoms will go away. Call your local emergency services (911 in the [place] Do not drive yourself to the hospital.  MAKE SURE YOU:  · Understand these instructions.  · Will watch your  condition.  · Will get help right away if you are not doing well or get worse.     This information is not intended to replace advice given to you by your health care provider. Make sure you discuss any questions you have with your health care provider.     Document Released: 01/20/2012 Document Revised: 12/23/2014 Document Reviewed: 06/19/2014  [employer] Interactive Patient Education ©2016 [employer]          Electrocardiography  Electrocardiography is a test that records the electrical impulses of the heart. It assesses many aspects of heart health, including:  · Heart function.  · Heart rhythm.  · Heart muscle thickness.  Electrocardiography can be done as a routine part of a physical exam. It can also be done to evaluate symptoms such as severe chest pain and heart palpitations.  PROCEDURE   · Electrocardiography is simple, safe, and painless. No electricity goes through your body during the procedure.  · You will be asked to remove your clothes from the waist up and lie on your back for the test.  · Sticky patches (electrodes) will be placed on your chest, arms, and legs. The electrodes will be attached by wires to the electrocardiography machine.  · You will be asked to relax and lie still for a few seconds while the electrocardiography machine records the electrical activity of your heart.  AFTER THE PROCEDURE  · If electrocardiography is part of a routine physical exam, you may return to normal activities as told by your health care provider.  · Your health care provider or a heart doctor (cardiologist) will interpret the recording.  · The test result may not be available during your visit. If your test result is not back during the visit, make an appointment with your health care provider to find out the result. It is your responsibility to get your test results.     This information is not intended to replace advice given to you by your health care provider. Make sure you discuss any questions you have  with your health care provider.     Document Released: 12/15/2001 Document Revised: 01/08/2016 Document Reviewed: 04/29/2013  [employer] Interactive Patient Education ©2016 [employer]          Exercise Stress Electrocardiogram  An exercise stress electrocardiogram is a test that is done to evaluate the blood supply to your heart. This test may also be called exercise stress electrocardiography. The test is done while you are walking on a treadmill. The goal of this test is to raise your heart rate. This test is done to find areas of poor blood flow to the heart by determining the extent of coronary artery disease (CAD).    CAD is defined as narrowing in one or more heart (coronary) arteries of more than 70%. If you have an abnormal test result, this may mean that you are not getting adequate blood flow to your heart during exercise. Additional testing may be needed to understand why your test was abnormal.  LET YOUR HEALTH CARE PROVIDER KNOW ABOUT:   · Any allergies you have.  · All medicines you are taking, including vitamins, herbs, eye drops, creams, and over-the-counter medicines.  · Previous problems you or members of your family have had with the use of anesthetics.  · Any blood disorders you have.  · Previous surgeries you have had.  · Medical conditions you have.  · Possibility of pregnancy, if this applies.  RISKS AND COMPLICATIONS  Generally, this is a safe procedure. However, as with any procedure, complications can occur. Possible complications can include:  · Pain or pressure in the following areas:    Chest.    Jaw or neck.    Between your shoulder blades.    Radiating down your left arm.  · Dizziness or light-headedness.  · Shortness of breath.  · Increased or irregular heartbeats.  · Nausea or vomiting.  · Heart attack (rare).  BEFORE THE PROCEDURE  · Avoid all forms of caffeine 24 hours before your test or as directed by your health care provider. This includes coffee, tea (even decaffeinated tea),  caffeinated sodas, chocolate, cocoa, and certain pain medicines.  · Follow your health care provider's instructions regarding eating and drinking before the test.  · Take your medicines as directed at regular times with water unless instructed otherwise. Exceptions may include:    If you have diabetes, ask how you are to take your insulin or pills. It is common to adjust insulin dosing the morning of the test.    If you are taking beta-blocker medicines, it is important to talk to your health care provider about these medicines well before the date of your test. Taking beta-blocker medicines may interfere with the test. In some cases, these medicines need to be changed or stopped 24 hours or more before the test.    If you wear a nitroglycerin patch, it may need to be removed prior to the test. Ask your health care provider if the patch should be removed before the test.  · If you use an inhaler for any breathing condition, bring it with you to the test.  · If you are an outpatient, bring a snack so you can eat right after the stress phase of the test.  · Do not smoke for 4 hours prior to the test or as directed by your health care provider.  · Do not apply lotions, powders, creams, or oils on your chest prior to the test.  · Wear loose-fitting clothes and comfortable shoes for the test. This test involves walking on a treadmill.  PROCEDURE  · Multiple patches (electrodes) will be put on your chest. If needed, small areas of your chest may have to be shaved to get better contact with the electrodes. Once the electrodes are attached to your body, multiple wires will be attached to the electrodes and your heart rate will be monitored.  · Your heart will be monitored both at rest and while exercising.  · You will walk on a treadmill. The treadmill will be started at a slow pace. The treadmill speed and incline will gradually be increased to raise your heart rate.  AFTER THE PROCEDURE  · Your heart rate and blood  pressure will be monitored after the test.  · You may return to your normal schedule including diet, activities, and medicines, unless your health care provider tells you otherwise.     This information is not intended to replace advice given to you by your health care provider. Make sure you discuss any questions you have with your health care provider.     Document Released: 12/15/2001 Document Revised: 12/23/2014 Document Reviewed: 08/25/2014  [employer] Interactive Patient Education ©2016 [employer].          Heart-Healthy Eating Plan  Many factors influence your heart health, including eating and exercise habits. Heart (coronary) risk increases with abnormal blood fat (lipid) levels. Heart-healthy meal planning includes limiting unhealthy fats, increasing healthy fats, and making other small dietary changes. This includes maintaining a healthy body weight to help keep lipid levels within a normal range.  WHAT IS MY PLAN?   Your health care provider recommends that you:  · Get no more than _________% of the total calories in your daily diet from fat.  · Limit your intake of saturated fat to less than _________% of your total calories each day.  · Limit the amount of cholesterol in your diet to less than _________ mg per day.  WHAT TYPES OF FAT SHOULD I CHOOSE?  · Choose healthy fats more often. Choose monounsaturated and polyunsaturated fats, such as olive oil and canola oil, flaxseeds, walnuts, almonds, and seeds.  · Eat more omega-3 fats. Good choices include salmon, mackerel, sardines, tuna, flaxseed oil, and ground flaxseeds. Aim to eat fish at least two times each week.  · Limit saturated fats. Saturated fats are primarily found in animal products, such as meats, butter, and cream. Plant sources of saturated fats include palm oil, palm kernel oil, and coconut oil.  · Avoid foods with partially hydrogenated oils in them. These contain trans fats. Examples of foods that contain trans fats are stick margarine,  "some tub margarines, cookies, crackers, and other baked goods.  WHAT GENERAL GUIDELINES DO I NEED TO FOLLOW?  · Check food labels carefully to identify foods with trans fats or high amounts of saturated fat.  · Fill one half of your plate with vegetables and green salads. Eat 4-5 servings of vegetables per day. A serving of vegetables equals 1 cup of raw leafy vegetables, ½ cup of raw or cooked cut-up vegetables, or ½ cup of vegetable juice.  · Fill one fourth of your plate with whole grains. Look for the word \"whole\" as the first word in the ingredient list.  · Fill one fourth of your plate with lean protein foods.  · Eat 4-5 servings of fruit per day. A serving of fruit equals one medium whole fruit, ¼ cup of dried fruit, ½ cup of fresh, frozen, or canned fruit, or ½ cup of 100% fruit juice.  · Eat more foods that contain soluble fiber. Examples of foods that contain this type of fiber are apples, broccoli, carrots, beans, peas, and barley. Aim to get 20-30 g of fiber per day.  · Eat more home-cooked food and less restaurant, buffet, and fast food.  · Limit or avoid alcohol.  · Limit foods that are high in starch and sugar.  · Avoid fried foods.  · Cook foods by using methods other than frying. Baking, boiling, grilling, and broiling are all great options. Other fat-reducing suggestions include:    Removing the skin from poultry.    Removing all visible fats from meats.    Skimming the fat off of stews, soups, and gravies before serving them.    Steaming vegetables in water or broth.  · Lose weight if you are overweight. Losing just 5-10% of your initial body weight can help your overall health and prevent diseases such as diabetes and heart disease.  · Increase your consumption of nuts, legumes, and seeds to 4-5 servings per week. One serving of dried beans or legumes equals ½ cup after being cooked, one serving of nuts equals 1½ ounces, and one serving of seeds equals ½ ounce or 1 tablespoon.  · You may need to " monitor your salt (sodium) intake, especially if you have high blood pressure. Talk with your health care provider or dietitian to get more information about reducing sodium.  WHAT FOODS CAN I EAT?  Grains  Breads, including Malay, white, carlos eduardo, wheat, raisin, rye, oatmeal, and Italian. Tortillas that are neither fried nor made with lard or trans fat. Low-fat rolls, including hotdog and hamburger buns and English muffins. Biscuits. Muffins. Waffles. Pancakes. Light popcorn. Whole-grain cereals. Flatbread. [place] toast. Pretzels. Breadsticks. Rusks. Low-fat snacks and crackers, including oyster, saltine, matzo, damian, animal, and rye. Rice and pasta, including brown rice and those that are made with whole wheat.  Vegetables  All vegetables.  Fruits  All fruits, but limit coconut.  Meats and Other Protein Sources  Lean, well-trimmed beef, veal, pork, and lamb. Chicken and turkey without skin. All fish and shellfish. Wild duck, rabbit, pheasant, and venison. Egg whites or low-cholesterol egg substitutes. Dried beans, peas, lentils, and tofu. Seeds and most nuts.  Dairy  Low-fat or nonfat cheeses, including ricotta, string, and mozzarella. Skim or 1% milk that is liquid, powdered, or evaporated. Buttermilk that is made with low-fat milk. Nonfat or low-fat yogurt.  Beverages  Mineral water. Diet carbonated beverages.  Sweets and Desserts  Sherbets and fruit ices. Honey, jam, marmalade, jelly, and syrups. Meringues and gelatins. Pure sugar candy, such as hard candy, jelly beans, gumdrops, mints, marshmallows, and small amounts of dark chocolate. Joel food cake.  Eat all sweets and desserts in moderation.  Fats and Oils  Nonhydrogenated (trans-free) margarines. Vegetable oils, including soybean, sesame, sunflower, olive, peanut, safflower, corn, canola, and cottonseed. Salad dressings or mayonnaise that are made with a vegetable oil. Limit added fats and oils that you use for cooking, baking, salads, and as  spreads.  Other  Cocoa powder. Coffee and tea. All seasonings and condiments.  The items listed above may not be a complete list of recommended foods or beverages. Contact your dietitian for more options.  WHAT FOODS ARE NOT RECOMMENDED?  Grains  Breads that are made with saturated or trans fats, oils, or whole milk. Croissants. Butter rolls. Cheese breads. Sweet rolls. Donuts. Buttered popcorn. Chow mein noodles. High-fat crackers, such as cheese or butter crackers.  Meats and Other Protein Sources  Fatty meats, such as hotdogs, short ribs, sausage, spareribs, oates, ribeye roast or steak, and mutton. High-fat deli meats, such as salami and bologna. Caviar. Domestic duck and goose. Organ meats, such as kidney, liver, sweetbreads, brains, gizzard, chitterlings, and heart.  Dairy  Cream, sour cream, cream cheese, and creamed cottage cheese. Whole milk cheeses, including blue (enedina), Andrew Maycol, Brie, Martínez, American, Havarti, Swiss, cheddar, Camembert, and Bivins.  Whole or 2% milk that is liquid, evaporated, or condensed. Whole buttermilk. Cream sauce or high-fat cheese sauce. Yogurt that is made from whole milk.  Beverages  Regular sodas and drinks with added sugar.  Sweets and Desserts  Frosting. Pudding. Cookies. Cakes other than lilibeth food cake. Candy that has milk chocolate or white chocolate, hydrogenated fat, butter, coconut, or unknown ingredients. Buttered syrups. Full-fat ice cream or ice cream drinks.  Fats and Oils  Gravy that has suet, meat fat, or shortening. Cocoa butter, hydrogenated oils, palm oil, coconut oil, palm kernel oil. These can often be found in baked products, candy, fried foods, nondairy creamers, and whipped toppings. Solid fats and shortenings, including oates fat, salt pork, lard, and butter. Nondairy cream substitutes, such as coffee creamers and sour cream substitutes. Salad dressings that are made of unknown oils, cheese, or sour cream.  The items listed above may not be a  complete list of foods and beverages to avoid. Contact your dietitian for more information.     This information is not intended to replace advice given to you by your health care provider. Make sure you discuss any questions you have with your health care provider.     Document Released: 09/26/2009 Document Revised: 01/08/2016 Document Reviewed: 06/11/2015  [employer] Interactive Patient Education ©2016 [employer]            SYMPTOMS OF A STROKE    Call 911 or have someone take you to the Emergency Department if you have any of the following:    · Sudden numbness or weakness of your face, arm or leg especially on one side of the body  · Sudden confusion, diffiiculty speaking or trouble understanding   · Changes in your vision or loss of sight in one eye  · Sudden severe headache with no known cause  · sudden dizziness, trouble walking, loss of balance or coordination    It is important to seek emergency care right away if you have further stroke symptoms. If you get emergency help quickly, the powerful clot-dissolving medicines can reduce the disabilities caused by a stroke.     For more information:    American Stroke Association  0-849-0-STROKE  www.strokeassociation.org           IF YOU SMOKE OR USE TOBACCO PLEASE READ THE FOLLOWING:    Why is smoking bad for me?  Smoking increases the risk of heart disease, lung disease, vascular disease, stroke, and cancer.     If you smoke, STOP!    If you would like more information on quitting smoking, please visit the [employer] website: [email or web address]   This link will provide additional resources including the QUIT line and the Beat the Pack support groups.     For more information:    American Cancer Society  ([phone] 475-1860    American Heart Association  1-230.653.3812               YOU ARE THE MOST IMPORTANT FACTOR IN YOUR RECOVERY.     Follow all instructions carefully.     I have reviewed my discharge instructions  with my nurse, including the following information, if applicable:     Information about my illness and diagnosis   Follow up appointments (including lab draws)   Wound Care   Equipment Needs   Medications (new and continuing) along with side effects   Preventative information such as vaccines and smoking cessations   Diet   Pain   I know when to contact my Doctor's office or seek emergency care      I want my nurse to describe the side effects of my medications: YES NO   If the answer is no, I understand the side effects of my medications: YES NO   My nurse described the side effects of my medications in a way that I could understand: YES NO   I have taken my personal belongings and my own medications with me at discharge: YES NO            I have received this information and my questions have been answered. I have discussed any concerns I see with this plan with the nurse or physician. I understand these instructions.    Signature of Patient or Responsible Person: _____________________________________    Date: _________________  Time: __________________    Signature of Healthcare Provider: _______________________________________  Date: _________________  Time: __________________

## 2017-01-18 NOTE — H&P
Primary care provider: [facility]    Chief complaint: Chest pain for 2 days      History of present illness:      46-year-old woman with history of hypertension, hyperlipidemia and family history of heart disease has been admitted with history of chest pain of 2 days' duration.    Chest pain-retrosternal, intermittent, sharp to burning in nature, lasting up to 1 minute, moderate in intensity, nonexertional with no definite aggravating or relieving factors and no radiation.  Patient also has history of heartburn.  Effort tolerance is good.  No history of PND, orthopnea or leg swelling.  She denied history of dizziness, palpitation or syncope.    Cardiac risk factors-hypertension, hyperlipidemia, previous history of smoking and family history of coronary artery disease.      Review of Systems      Constitutional-none  ENT-none  Cardiovascular-as above  GI-heartburn  Endocrine-lipid disorder  Respiratory-allergic disorder  Detailed review of father organ systems were done    Past medical history-hypertension, hyperlipidemia and allergic disorder  Past surgical history-hysterectomy and hernia repair    History  Past Medical History   Diagnosis Date   • Anxiety    • Arthritis    • Asthma    • Depression    • Depression    • Hyperlipidemia    • Hypertension    • Mitral valve regurgitation    • PONV (postoperative nausea and vomiting)    • Sleep apnea    , Past Surgical History   Procedure Laterality Date   •  section     • Appendectomy     • Foot surgery     • Hysterectomy     • [place] tooth extraction     • Mouth surgery     • Tubal abdominal ligation     • Diagnostic laparoscopy N/A 10/14/2016     Procedure: DIAGNOSTIC LAPAROSCOPY POSSIBLE RIGHT SALPINGOOPHORECTOMY;  Surgeon: Rory Owens DO;  Location: [facility];  Service:    • Trachelectomy N/A 10/14/2016     Procedure: TRACHELECTOMY VAGINAL;  Surgeon: Rory Owens DO;  Location: [facility] OR;  Service:    • Umbilical hernia  "repair N/A 10/14/2016     Procedure: UMBILICAL HERNIA REPAIR;  Surgeon: Spike Porter MD;  Location: [facility] OR;  Service:    • Colonoscopy  2016   • Cardiac catheterization     • Umbilical hernia repair N/A 12/9/2016     Procedure: UMBILICAL HERNIA REPAIR;  Surgeon: Spike Porter MD;  Location: [facility];  Service:    , Family History   Problem Relation Age of Onset   • No Known Problems Mother    • No Known Problems Father    • No Known Problems Sister    • No Known Problems Brother    • No Known Problems Son    • No Known Problems Daughter    • No Known Problems Maternal Grandmother    • No Known Problems Maternal Grandfather    • No Known Problems Paternal Grandmother    • No Known Problems Paternal Grandfather    • No Known Problems Cousin    • Rheum arthritis Neg Hx    • Osteoarthritis Neg Hx    • Asthma Neg Hx    • Diabetes Neg Hx    • Heart failure Neg Hx    • Hyperlipidemia Neg Hx    • Hypertension Neg Hx    • Migraines Neg Hx    • Rashes / Skin problems Neg Hx    • Seizures Neg Hx    • Stroke Neg Hx    • Thyroid disease Neg Hx    , Social History   Substance Use Topics   • Smoking status: Former Smoker     Packs/day: 0.50     Years: 10.00     Quit date: 2014   • Smokeless tobacco: Never Used   • Alcohol use No      Comment: not often        Medication Review:        Allergies:  Amlodipine; Morphine and related; Nifedipine; Diazepam; Midazolam; and Sulfa antibiotics  Objective     Vital Sign Min/Max for last 24 hours  Temp  Min: 97.3 °F (36.3 °C)  Max: 97.3 °F (36.3 °C)   BP  Min: 91/56  Max: 135/69   Pulse  Min: 71  Max: 90   Resp  Min: 18  Max: 18   SpO2  Min: 95 %  Max: 100 %   No Data Recorded   Weight  Min: 270 lb (122 kg)  Max: 270 lb (122 kg)     Flowsheet Rows         First Filed Value    Admission Height  61\" (154.9 cm) Documented at 01/18/2017 1354    Admission Weight  270 lb (122 kg) Documented at 01/18/2017 1354             Physical Exam:     General Appearance:    Alert, cooperative, in no " acute distress   Head:    Normocephalic, without obvious abnormality, atraumatic   Eyes:            Lids and lashes normal, conjunctivae and sclerae normal, no   icterus, no pallor, corneas clear, PERRLA   Ears:    Ears appear intact with no abnormalities noted   Throat:   No oral lesions, no thrush, oral mucosa moist   Neck:   No adenopathy, supple, trachea midline, no thyromegaly, no   carotid bruit, no JVD   Back:     No kyphosis present, no scoliosis present, no skin lesions,      erythema or scars, no tenderness to percussion or                   palpation,   range of motion normal   Lungs:     Clear to auscultation,respirations regular, even and                  unlabored    Heart:    Regular rhythm and normal rate, normal S1 and S2, no            murmur, no gallop, no rub, no click   Chest Wall:    No abnormalities observed   Abdomen:     Normal bowel sounds, no masses, no organomegaly, soft        non-tender, non-distended, no guarding, no rebound                tenderness   Rectal:     Deferred   Extremities:   Moves all extremities well, no edema, no cyanosis, no             redness   Pulses:   Pulses palpable and equal bilaterally   Skin:   No bleeding, bruising or rash   Lymph nodes:   No palpable adenopathy   Neurologic:   Cranial nerves 2 - 12 grossly intact, sensation intact, DTR       present and equal bilaterally       Telemetry  Normal sinus rhythm    ECG-normal sinus rhythm and possible old inferior MI  ECG/EMG Results (last 24 hours)     Procedure Component Value Units Date/Time    ECG 12 Lead [[id number]] Resulted:  01/18/17 1350     Updated:  01/18/17 1358            Labs    Results from last 7 days  Lab Units 01/18/17  1427   WBC 10*3/mm3 10.47   HEMOGLOBIN g/dL 13.8   HEMATOCRIT % 41.1   PLATELETS 10*3/mm3 374       Results from last 7 days  Lab Units 01/18/17  1435   SODIUM mmol/L 139   POTASSIUM mmol/L 4.0   CHLORIDE mmol/L 102   TOTAL CO2 mmol/L 28.0   BUN mg/dL 11   CREATININE mg/dL 0.64    CALCIUM mg/dL 9.6   GLUCOSE mg/dL 132*       Results from last 7 days  Lab Units 01/18/17  1435   BILIRUBIN mg/dL 0.3   ALK PHOS U/L 100   AST (SGOT) U/L 22   ALT (SGPT) U/L 22                   Results from last 7 days  Lab Units 01/18/17  1618 01/18/17  1435   CK TOTAL U/L  --  76   TROPONIN I ng/mL <0.006 <0.006   CK MB INDEX %  --  0.4           Imaging  [id number]        Assessment     1.  Chest pain with both typical and atypical features for angina.  Rule out myocardial infarction  2.  Hypertension  3.  Hyperlipidemia  4.  Family history of heart disease  5.  History of mitral regurgitation    Plan    Patient has been admitted to a telemetry bed  Obtain serial cardiac markers and EKGs  Start aspirin 81 mg daily and transdermal nitrate  Continue metoprolol, lisinopril and atorvastatin  Get lipid panel  Lexiscan myocardial perfusion study if MI is ruled out  Echocardiogram to evaluate LV function and valves.    I discussed the patients findings and my recommendations with patient    Maureen Davies MD  01/18/17  4:53 PM      Cc: [facility]

## 2017-01-19 ENCOUNTER — APPOINTMENT (OUTPATIENT)
Dept: CARDIOLOGY | Facility: HOSPITAL | Age: 47
End: 2017-01-19
Attending: INTERNAL MEDICINE

## 2017-01-19 ENCOUNTER — APPOINTMENT (OUTPATIENT)
Dept: NUCLEAR MEDICINE | Facility: HOSPITAL | Age: 47
End: 2017-01-19
Attending: INTERNAL MEDICINE

## 2017-01-19 VITALS
TEMPERATURE: 98.3 F | BODY MASS INDEX: 50.61 KG/M2 | DIASTOLIC BLOOD PRESSURE: 64 MMHG | OXYGEN SATURATION: 95 % | SYSTOLIC BLOOD PRESSURE: 123 MMHG | HEIGHT: 62 IN | WEIGHT: 275 LBS | HEART RATE: 84 BPM | RESPIRATION RATE: 20 BRPM

## 2017-01-19 LAB
BH CV ECHO MEAS - % IVS THICK: 20 %
BH CV ECHO MEAS - % LVPW THICK: 29.6 %
BH CV ECHO MEAS - ACS: 2.1 CM
BH CV ECHO MEAS - AO ROOT AREA (BSA CORRECTED): 1.3
BH CV ECHO MEAS - AO ROOT AREA: 6.7 CM^2
BH CV ECHO MEAS - AO ROOT DIAM: 2.9 CM
BH CV ECHO MEAS - BSA(HAYCOCK): 2.5 M^2
BH CV ECHO MEAS - BSA: 2.2 M^2
BH CV ECHO MEAS - BZI_BMI: 52.5 KILOGRAMS/M^2
BH CV ECHO MEAS - BZI_METRIC_HEIGHT: 157.5 CM
BH CV ECHO MEAS - BZI_METRIC_WEIGHT: 130.2 KG
BH CV ECHO MEAS - CONTRAST EF 4CH: 57.7 ML/M^2
BH CV ECHO MEAS - EDV(CUBED): 107.2 ML
BH CV ECHO MEAS - EDV(MOD-SP4): 78 ML
BH CV ECHO MEAS - EDV(TEICH): 104.9 ML
BH CV ECHO MEAS - EF(CUBED): 76.9 %
BH CV ECHO MEAS - EF(MOD-SP4): 57.7 %
BH CV ECHO MEAS - EF(TEICH): 69 %
BH CV ECHO MEAS - ESV(CUBED): 24.7 ML
BH CV ECHO MEAS - ESV(MOD-SP4): 33 ML
BH CV ECHO MEAS - ESV(TEICH): 32.6 ML
BH CV ECHO MEAS - FS: 38.7 %
BH CV ECHO MEAS - IVS/LVPW: 0.93
BH CV ECHO MEAS - IVSD: 1.1 CM
BH CV ECHO MEAS - IVSS: 1.3 CM
BH CV ECHO MEAS - LA DIMENSION: 3 CM
BH CV ECHO MEAS - LA/AO: 1
BH CV ECHO MEAS - LV DIASTOLIC VOL/BSA (35-75): 35 ML/M^2
BH CV ECHO MEAS - LV MASS(C)D: 206.7 GRAMS
BH CV ECHO MEAS - LV MASS(C)DI: 92.8 GRAMS/M^2
BH CV ECHO MEAS - LV MASS(C)S: 144.5 GRAMS
BH CV ECHO MEAS - LV MASS(C)SI: 64.9 GRAMS/M^2
BH CV ECHO MEAS - LV SYSTOLIC VOL/BSA (12-30): 14.8 ML/M^2
BH CV ECHO MEAS - LVIDD: 4.8 CM
BH CV ECHO MEAS - LVIDS: 2.9 CM
BH CV ECHO MEAS - LVLD AP4: 8.4 CM
BH CV ECHO MEAS - LVLS AP4: 7.4 CM
BH CV ECHO MEAS - LVOT AREA (M): 2.8 CM^2
BH CV ECHO MEAS - LVOT AREA: 2.8 CM^2
BH CV ECHO MEAS - LVOT DIAM: 1.9 CM
BH CV ECHO MEAS - LVPWD: 1.2 CM
BH CV ECHO MEAS - LVPWS: 1.6 CM
BH CV ECHO MEAS - MV A MAX VEL: 111.8 CM/SEC
BH CV ECHO MEAS - MV E MAX VEL: 85 CM/SEC
BH CV ECHO MEAS - MV E/A: 0.76
BH CV ECHO MEAS - PA ACC SLOPE: 1215 CM/SEC^2
BH CV ECHO MEAS - PA ACC TIME: 0.12 SEC
BH CV ECHO MEAS - PA PR(ACCEL): 26.7 MMHG
BH CV ECHO MEAS - RAP SYSTOLE: 10 MMHG
BH CV ECHO MEAS - RVSP: 33.1 MMHG
BH CV ECHO MEAS - SI(CUBED): 37 ML/M^2
BH CV ECHO MEAS - SI(MOD-SP4): 20.2 ML/M^2
BH CV ECHO MEAS - SI(TEICH): 32.5 ML/M^2
BH CV ECHO MEAS - SV(CUBED): 82.5 ML
BH CV ECHO MEAS - SV(MOD-SP4): 45 ML
BH CV ECHO MEAS - SV(TEICH): 72.4 ML
BH CV ECHO MEAS - TR MAX VEL: 240.3 CM/SEC
BH CV NUCLEAR PRIOR STUDY: 3
BH CV STRESS BP STAGE 1: NORMAL
BH CV STRESS BP STAGE 2: NORMAL
BH CV STRESS COMMENTS STAGE 1: NORMAL
BH CV STRESS COMMENTS STAGE 2: NORMAL
BH CV STRESS DOSE REGADENOSON STAGE 1: 0.4
BH CV STRESS DURATION MIN STAGE 1: 0
BH CV STRESS DURATION MIN STAGE 2: 4
BH CV STRESS DURATION SEC STAGE 1: 15
BH CV STRESS DURATION SEC STAGE 2: 0
BH CV STRESS HR STAGE 1: 85
BH CV STRESS HR STAGE 2: 94
BH CV STRESS PROTOCOL 1: NORMAL
BH CV STRESS RECOVERY BP: NORMAL MMHG
BH CV STRESS RECOVERY HR: 85 BPM
BH CV STRESS STAGE 1: 1
BH CV STRESS STAGE 2: 2
LV EF 2D ECHO EST: 65 %
MAXIMAL PREDICTED HEART RATE: 176 BPM
PERCENT MAX PREDICTED HR: 69.89 %
STRESS BASELINE BP: NORMAL MMHG
STRESS BASELINE HR: 89 BPM
STRESS PERCENT HR: 82 %
STRESS POST PEAK BP: NORMAL MMHG
STRESS POST PEAK HR: 123 BPM
STRESS TARGET HR: 150 BPM
TROPONIN I SERPL-MCNC: <0.006 NG/ML
TROPONIN I SERPL-MCNC: <0.006 NG/ML

## 2017-01-19 PROCEDURE — 84484 ASSAY OF TROPONIN QUANT: CPT | Performed by: INTERNAL MEDICINE

## 2017-01-19 PROCEDURE — 0 TECHNETIUM SESTAMIBI: Performed by: INTERNAL MEDICINE

## 2017-01-19 PROCEDURE — 78452 HT MUSCLE IMAGE SPECT MULT: CPT

## 2017-01-19 PROCEDURE — 96375 TX/PRO/DX INJ NEW DRUG ADDON: CPT

## 2017-01-19 PROCEDURE — G0378 HOSPITAL OBSERVATION PER HR: HCPCS

## 2017-01-19 PROCEDURE — 25010000002 REGADENOSON 0.4 MG/5ML SOLUTION: Performed by: INTERNAL MEDICINE

## 2017-01-19 PROCEDURE — 93017 CV STRESS TEST TRACING ONLY: CPT

## 2017-01-19 PROCEDURE — 25010000002 AMINOPHYLLINE PER 250 MG: Performed by: INTERNAL MEDICINE

## 2017-01-19 PROCEDURE — A9500 TC99M SESTAMIBI: HCPCS | Performed by: INTERNAL MEDICINE

## 2017-01-19 PROCEDURE — 94799 UNLISTED PULMONARY SVC/PX: CPT

## 2017-01-19 PROCEDURE — 93306 TTE W/DOPPLER COMPLETE: CPT

## 2017-01-19 RX ORDER — AMINOPHYLLINE DIHYDRATE 25 MG/ML
125 INJECTION, SOLUTION INTRAVENOUS ONCE
Status: COMPLETED | OUTPATIENT
Start: 2017-01-19 | End: 2017-01-19

## 2017-01-19 RX ADMIN — ASPIRIN 81 MG: 81 TABLET ORAL at 12:19

## 2017-01-19 RX ADMIN — REGADENOSON 0.4 MG: 0.08 INJECTION, SOLUTION INTRAVENOUS at 09:45

## 2017-01-19 RX ADMIN — AMINOPHYLLINE 125 MG: 25 INJECTION, SOLUTION INTRAVENOUS at 09:49

## 2017-01-19 RX ADMIN — ONDANSETRON 4 MG: 4 TABLET, ORALLY DISINTEGRATING ORAL at 12:19

## 2017-01-19 RX ADMIN — Medication 1 DOSE: at 09:45

## 2017-01-19 RX ADMIN — Medication 1 DOSE: at 07:48

## 2017-01-19 RX ADMIN — METOPROLOL TARTRATE 25 MG: 25 TABLET, FILM COATED ORAL at 12:18

## 2017-01-19 RX ADMIN — LISINOPRIL 40 MG: 10 TABLET ORAL at 12:19

## 2017-01-19 RX ADMIN — CETIRIZINE HYDROCHLORIDE 10 MG: 10 TABLET ORAL at 12:18

## 2017-01-19 RX ADMIN — FLUOXETINE 40 MG: 20 CAPSULE ORAL at 16:43

## 2017-01-19 RX ADMIN — ONDANSETRON 4 MG: 4 TABLET, ORALLY DISINTEGRATING ORAL at 16:43

## 2017-01-19 RX ADMIN — MONTELUKAST SODIUM 10 MG: 10 TABLET, COATED ORAL at 12:18

## 2017-01-19 NOTE — PROGRESS NOTES
Discharge Planning Assessment   [place]     Patient Name: Shaun Morales  MRN: [id number]  Today's Date: 1/19/2017    Admit Date: 1/18/2017          Discharge Needs Assessment       01/19/17 1541    Living Environment    Lives With --   Lives at home with her spouse, Lane.    Quality Of Family Relationships supportive    Able to Return to Prior Living Arrangements yes    Discharge Needs Assessment    Concerns To Be Addressed no discharge needs identified;denies needs/concerns at this time    Readmission Within The Last 30 Days no previous admission in last 30 days    Anticipated Changes Related to Illness none    Equipment Currently Used at Home other (see comments)   Pt uses Cpap at home, provided by [employer]    Equipment Needed After Discharge none    Discharge Disposition home or self-care            Discharge Plan       01/19/17 1542    Case Management/Social Work Plan    Plan Pt placed in observation status on 1/18/17 with dx: chest pain. She is undergoing cardiac workup and monitoring. Stress test results pending. She lives at home with her spouse, Lane and plans to return back home when stable for dc. Pt currently denies any anticipated dc needs.    Patient/Family In Agreement With Plan yes        Discharge Placement     No information found                Demographic Summary       01/19/17 1537    Referral Information    Arrived From home or self-care   Pt placed in observation status on 1/18/17 with dx: chest pain. Pt undergoing cardiac workup. Stress test results pending.    Referral Source admission list    Reason For Consult discharge planning    Record Reviewed medical record    Primary Care Physician Information    Name PALOMO Pedro            Functional Status       01/19/17 1539    Functional Status Current    Current Functional Level Comment indep    Change in Functional Status Since Onset of Current Illness/Injury no    Functional Status Prior    Prior Functional Level Comment indep     IADL    IADL Comments indep    Activity Tolerance    Current Activity Limitations none    Usual Activity Tolerance good    Current Activity Tolerance good    Cognitive/Perceptual/Developmental    Current Mental Status/Cognitive Functioning no deficits noted    Recent Changes in Mental Status/Cognitive Functioning no changes    Employment/Financial    Financial Concerns none   Pt has [employer] and pt denies any issues with med cost or copays. Denies any needs or concerns. She utilizes [facility] for her prescriptions            Psychosocial     None            Abuse/Neglect     None            Legal       01/19/17 4593    Legal    Legal Comments No POA or AD            Substance Abuse     None            Patient Forms     None          Cassie Crawford RN

## 2017-01-20 NOTE — DISCHARGE SUMMARY
Date of Discharge:  1/19/2017    Discharge Diagnosis:   Principal diagnosis-  Noncardiac chest pain-related to GERD    Secondary diagnosis-  1.  Hypertension  2.  Hyperlipidemia  3.  Allergic disorder  4.  Hyperglycemia      Reason for admission  Chest pain, unspecified type [R07.9]        Hospital Course  Patient is a 46 y.o. female presented with chest pain of 2 days' duration.  Chest pain is retrosternal, sharp, pressure-like, nonexertional and of moderate intensity.  She was admitted to a telemetry bed.  Her chest pain was reduced in frequency and duration while she was in the hospital.  MI was ruled out by serial cardiac markers and EKGs.  EKG showed no evidence of ischemia.  A Lexiscan myocardial perfusion study was done.  It showed no evidence of ischemia.  EF was normal.  Echocardiogram showed normal left ventricular systolic function and wall motion and no evidence of significant valvular heart disease.  Reassurance was given.  Her chest pain was considered as noncardiac and positive bili related to underlying GERD sheet.  Advised her to continue proton pump inhibitor.  Hypertension was well-controlled.  She had mild hyperglycemia with blood glucose level of 136-nonfasting.  Patient has history of mild hyperglycemia and her glycosylated hemoglobin was between 6 and 6.5 in the recent past according to her.  Regular exercise and weight reductions were advised.  She will follow-up with her primary care provider, Ms. Medel in 2 weeks for hyperglycemia and follow-up after hospitalization.  Her condition at the time of discharge-stable.      Procedures Performed-    A nuclear stress test-    ·   Findings consistent with a normal ECG stress test.: Patioent experienced no chest pain.  · Myocardial perfusion imaging indicates a normal myocardial perfusion study with no evidence of ischemia.  · Left ventricular ejection fraction is normal. EF>70%  · Normal LV wall motion noted.  Impressions are consistent with a  low risk study.    Echocardiogram-    · The study is technically difficult for diagnosis.Poor echo windows..  · Left ventricular function is normal. Estimated EF = 65%.  · All left ventricular wall segments contract normally.  · Left ventricular diastolic dysfunction (grade I) consistent with impaired relaxation.  · The aortic valve is structurally normal. No aortic valve regurgitation is present. No aortic valve stenosis is present  · Mild mitral valve regurgitation is present  · Mild tricuspid valve regurgitation is present.  · There is no evidence of pericardial effusion.    Consults:   Consults     Date and Time Order Name Status Description    1/18/2017 1543 Cardiology (on-call MD unless specified)            Pertinent labs and imaging resulys:    Results from last 7 days  Lab Units 01/18/17  1427   WBC 10*3/mm3 10.47   HEMOGLOBIN g/dL 13.8   HEMATOCRIT % 41.1   PLATELETS 10*3/mm3 374       Results from last 7 days  Lab Units 01/18/17  1435   SODIUM mmol/L 139   POTASSIUM mmol/L 4.0   CHLORIDE mmol/L 102   TOTAL CO2 mmol/L 28.0   BUN mg/dL 11   CREATININE mg/dL 0.64   CALCIUM mg/dL 9.6   GLUCOSE mg/dL 132*       Results from last 7 days  Lab Units 01/18/17  1435   BILIRUBIN mg/dL 0.3   ALK PHOS U/L 100   AST (SGOT) U/L 22   ALT (SGPT) U/L 22                   Results from last 7 days  Lab Units 01/19/17  0500 01/19/17  0043 01/18/17  1746  01/18/17  1435   CK TOTAL U/L  --   --   --   --  76   TROPONIN I ng/mL <0.006 <0.006 <0.006  < > <0.006   CK MB INDEX %  --   --   --   --  0.4   < > = values in this interval not displayed.          Imaging Results (last 72 hours)     Procedure Component Value Units Date/Time    XR Chest 1 View [[id number]] Collected:  01/18/17 1500     Updated:  01/18/17 1625    Narrative:       XR CHEST 1 VW-     REASON FOR EXAM:  CP.     The chest is compared with an earlier chest on 01/02/2017.     FINDINGS: The cardiac silhouette and mediastinum are normal in size and  configuration. The  lungs are both well-aerated and clear. There are no  pleural effusions in the lung bases. No consolidated areas of pneumonia  are identified. The bones and soft tissues are unremarkable.       Impression:       No evidence of active disease in the chest. No source for  the patient's chest pain was demonstrated.     This report was finalized on 1/18/2017 3:50 PM by Dr. Neri Garcia II, MD.               Condition on Discharge:  Stable        Discharge Disposition  Home or Self Care    Discharge Medications   Shaun Morales   Home Medication Instructions KOFFI:[id number]    Printed on:01/19/17 2003   Medication Information                      albuterol (PROVENTIL HFA;VENTOLIN HFA) 108 (90 BASE) MCG/ACT inhaler  Inhale 2 puffs every 4 (four) hours as needed for wheezing             aspirin 81 MG EC tablet  Take 81 mg by mouth Daily.             atorvastatin (LIPITOR) 20 MG tablet  Take 20 mg by mouth Daily.             cetirizine (ZyrTEC) 10 MG tablet  Take 10 mg by mouth daily             FLUoxetine (PROzac) 20 MG capsule  Take 40 mg by mouth 2 (two) times a day             ibuprofen (ADVIL,MOTRIN) 800 MG tablet  Take 1 tablet by mouth 3 (Three) Times a Day.             lisinopril-hydrochlorothiazide (PRINZIDE,ZESTORETIC) 20-25 MG per tablet  Take 1 tablet by mouth Daily. Takes with Lisinopril 20mg. Total of 40 mg or lisinopril daily.             metoprolol succinate XL (TOPROL-XL) 50 MG 24 hr tablet  Take 50 mg by mouth daily             montelukast (SINGULAIR) 10 MG tablet  Take 10 mg by mouth Daily.             omeprazole (priLOSEC) 20 MG capsule  Take 1 capsule by mouth Daily.               LIPITOR 20 mg P.O. daily  Discharge Diet:   Diet Instructions     Diet: Cardiac; Thin Liquids, No Restrictions       Discharge Diet:  Cardiac   Fluid Consistency:  Thin Liquids, No Restrictions               Cardiac                   Activity at Discharge:   Activity Instructions     Activity as Tolerated                Additional Activity Instructions:      As Tolerated                   Follow-up Appointments  No future appointments.  Referrals and Follow-ups to Schedule     Follow-Up    As directed    In 2 wks   Follow Up Details:  Thom CULVER                 Duration for discharge process-approximately 40 minutes     Maureen Davies MD  01/19/17  8:03 PM     Cc: Bertin CULVER  [facility]

## 2017-05-22 ENCOUNTER — TRANSCRIBE ORDERS (OUTPATIENT)
Dept: ADMINISTRATIVE | Facility: HOSPITAL | Age: 47
End: 2017-05-22

## 2017-05-22 DIAGNOSIS — R06.02 SOB (SHORTNESS OF BREATH): Primary | ICD-10-CM

## 2017-05-26 ENCOUNTER — OFFICE VISIT (OUTPATIENT)
Dept: UROLOGY | Facility: CLINIC | Age: 47
End: 2017-05-26

## 2017-05-26 VITALS
WEIGHT: 270 LBS | BODY MASS INDEX: 49.69 KG/M2 | DIASTOLIC BLOOD PRESSURE: 89 MMHG | SYSTOLIC BLOOD PRESSURE: 134 MMHG | HEIGHT: 62 IN | HEART RATE: 73 BPM

## 2017-05-26 DIAGNOSIS — R10.2 PELVIC PAIN: Primary | ICD-10-CM

## 2017-05-26 DIAGNOSIS — N94.9 ADNEXAL CYST: ICD-10-CM

## 2017-05-26 LAB
ANION GAP SERPL CALCULATED.3IONS-SCNC: 10.8 MMOL/L (ref 3.6–11.2)
BILIRUB BLD-MCNC: NEGATIVE MG/DL
BUN BLD-MCNC: 19 MG/DL (ref 7–21)
BUN/CREAT SERPL: 26 (ref 7–25)
CALCIUM SPEC-SCNC: 9.7 MG/DL (ref 7.7–10)
CHLORIDE SERPL-SCNC: 106 MMOL/L (ref 99–112)
CLARITY, POC: CLEAR
CO2 SERPL-SCNC: 25.2 MMOL/L (ref 24.3–31.9)
COLOR UR: YELLOW
CREAT BLD-MCNC: 0.73 MG/DL (ref 0.43–1.29)
GFR SERPL CREATININE-BSD FRML MDRD: 87 ML/MIN/1.73
GLUCOSE BLD-MCNC: 115 MG/DL (ref 70–110)
GLUCOSE UR STRIP-MCNC: NEGATIVE MG/DL
KETONES UR QL: NEGATIVE
LEUKOCYTE EST, POC: NEGATIVE
NITRITE UR-MCNC: NEGATIVE MG/ML
OSMOLALITY SERPL CALC.SUM OF ELEC: 286.3 MOSM/KG (ref 273–305)
PH UR: 6 [PH] (ref 5–8)
POTASSIUM BLD-SCNC: 4.2 MMOL/L (ref 3.5–5.3)
PROT UR STRIP-MCNC: NEGATIVE MG/DL
RBC # UR STRIP: NEGATIVE /UL
SODIUM BLD-SCNC: 142 MMOL/L (ref 135–153)
SP GR UR: 1.03 (ref 1–1.03)
UROBILINOGEN UR QL: NORMAL

## 2017-05-26 PROCEDURE — 99203 OFFICE O/P NEW LOW 30 MIN: CPT | Performed by: NURSE PRACTITIONER

## 2017-05-26 PROCEDURE — 80048 BASIC METABOLIC PNL TOTAL CA: CPT | Performed by: NURSE PRACTITIONER

## 2017-05-26 PROCEDURE — 81003 URINALYSIS AUTO W/O SCOPE: CPT | Performed by: NURSE PRACTITIONER

## 2017-06-21 ENCOUNTER — HOSPITAL ENCOUNTER (OUTPATIENT)
Dept: GENERAL RADIOLOGY | Facility: HOSPITAL | Age: 47
Discharge: HOME | End: 2017-06-21

## 2017-06-21 ENCOUNTER — HOSPITAL ENCOUNTER (OUTPATIENT)
Dept: CT IMAGING | Facility: HOSPITAL | Age: 47
Discharge: HOME | End: 2017-06-21
Admitting: NURSE PRACTITIONER

## 2017-06-21 ENCOUNTER — TRANSCRIBE ORDERS (OUTPATIENT)
Dept: ULTRASOUND IMAGING | Facility: HOSPITAL | Age: 47
End: 2017-06-21

## 2017-06-21 DIAGNOSIS — N94.9 ADNEXAL CYST: ICD-10-CM

## 2017-06-21 DIAGNOSIS — M25.511 RIGHT SHOULDER PAIN, UNSPECIFIED CHRONICITY: Primary | ICD-10-CM

## 2017-06-21 DIAGNOSIS — M25.511 RIGHT SHOULDER PAIN, UNSPECIFIED CHRONICITY: ICD-10-CM

## 2017-06-21 DIAGNOSIS — R10.2 PELVIC PAIN: ICD-10-CM

## 2017-06-21 PROCEDURE — 73030 X-RAY EXAM OF SHOULDER: CPT | Performed by: RADIOLOGY

## 2017-06-21 PROCEDURE — 73030 X-RAY EXAM OF SHOULDER: CPT

## 2017-06-21 PROCEDURE — 74178 CT ABD&PLV WO CNTR FLWD CNTR: CPT

## 2017-06-21 PROCEDURE — 74178 CT ABD&PLV WO CNTR FLWD CNTR: CPT | Performed by: RADIOLOGY

## 2017-06-21 PROCEDURE — 0 IOPAMIDOL 61 % SOLUTION: Performed by: PHYSICIAN ASSISTANT

## 2017-06-21 RX ADMIN — IOPAMIDOL 100 ML: 612 INJECTION, SOLUTION INTRAVENOUS at 12:00

## 2017-06-29 ENCOUNTER — OFFICE VISIT (OUTPATIENT)
Dept: UROLOGY | Facility: CLINIC | Age: 47
End: 2017-06-29

## 2017-06-29 DIAGNOSIS — R10.32 LEFT LOWER QUADRANT PAIN: ICD-10-CM

## 2017-06-29 DIAGNOSIS — N94.9 ADNEXAL CYST: Primary | ICD-10-CM

## 2017-06-29 PROCEDURE — 99213 OFFICE O/P EST LOW 20 MIN: CPT | Performed by: NURSE PRACTITIONER

## 2017-06-29 NOTE — PROGRESS NOTES
Chief Complaint:          Chief Complaint   Patient presents with   • Review Ct Scan       HPI:   47 y.o. female being seen today to review CT scan for history of a complexed left adnexal cyst measuring 5 cm diagnosed per CT scan on 10/31/2016 for complaint of left lower quadrant pain. Patient continues to complain of left lower quadrant pain today but denies any nausea, vomiting, fever or chills.  She is a former smoker of less than one half pack per day ×10 years. Does give family history of father having throat and neck cancer. Urinalysis today reveals negative results.    HPI        Past Medical History:        Past Medical History:   Diagnosis Date   • Anxiety    • Arthritis    • Asthma    • Depression    • Depression    • Hyperlipidemia    • Hypertension    • Mitral valve regurgitation    • PONV (postoperative nausea and vomiting)    • Sleep apnea          Current Meds:     Current Outpatient Prescriptions   Medication Sig Dispense Refill   • albuterol (PROVENTIL HFA;VENTOLIN HFA) 108 (90 BASE) MCG/ACT inhaler Inhale 2 puffs every 4 (four) hours as needed for wheezing     • aspirin 81 MG EC tablet Take 81 mg by mouth Daily.     • atorvastatin (LIPITOR) 20 MG tablet Take 20 mg by mouth Daily.     • cetirizine (ZyrTEC) 10 MG tablet Take 10 mg by mouth daily     • FLUoxetine (PROzac) 20 MG capsule Take 40 mg by mouth 2 (two) times a day     • ibuprofen (ADVIL,MOTRIN) 800 MG tablet Take 1 tablet by mouth 3 (Three) Times a Day. 60 tablet 0   • lisinopril-hydrochlorothiazide (PRINZIDE,ZESTORETIC) 20-25 MG per tablet Take 1 tablet by mouth Daily. Takes with Lisinopril 20mg. Total of 40 mg or lisinopril daily.     • metoprolol succinate XL (TOPROL-XL) 50 MG 24 hr tablet Take 50 mg by mouth daily     • montelukast (SINGULAIR) 10 MG tablet Take 10 mg by mouth Daily.     • omeprazole (priLOSEC) 20 MG capsule Take 1 capsule by mouth Daily. 30 capsule 0     No current facility-administered medications for this visit.          Allergies:      Allergies   Allergen Reactions   • Amlodipine GI Intolerance and Arrhythmia   • Morphine And Related Itching   • Nifedipine GI Intolerance and Arrhythmia     Adalat, procardia   • Diazepam Anxiety   • Midazolam Anxiety   • Sulfa Antibiotics Rash        Past Surgical History:     Past Surgical History:   Procedure Laterality Date   • APPENDECTOMY     • CARDIAC CATHETERIZATION     •  SECTION     • COLONOSCOPY     • DIAGNOSTIC LAPAROSCOPY N/A 10/14/2016    Procedure: DIAGNOSTIC LAPAROSCOPY POSSIBLE RIGHT SALPINGOOPHORECTOMY;  Surgeon: Rory Owens DO;  Location: [facility];  Service:    • FOOT SURGERY     • HYSTERECTOMY  2008   • MOUTH SURGERY     • TRACHELECTOMY N/A 10/14/2016    Procedure: TRACHELECTOMY VAGINAL;  Surgeon: Rory Owens DO;  Location: [facility] OR;  Service:    • TUBAL ABDOMINAL LIGATION     • UMBILICAL HERNIA REPAIR N/A 10/14/2016    Procedure: UMBILICAL HERNIA REPAIR;  Surgeon: Spike Porter MD;  Location: [facility] OR;  Service:    • UMBILICAL HERNIA REPAIR N/A 2016    Procedure: UMBILICAL HERNIA REPAIR;  Surgeon: Spike Porter MD;  Location: [facility];  Service:    • WISDOM TOOTH EXTRACTION           Social History:     Social History     Social History   • Marital status:      Spouse name: N/A   • Number of children: N/A   • Years of education: N/A     Occupational History   • Not on file.     Social History Main Topics   • Smoking status: Former Smoker     Packs/day: 0.50     Years: 10.00     Quit date:    • Smokeless tobacco: Never Used   • Alcohol use No      Comment: not often   • Drug use: No   • Sexual activity: Defer     Other Topics Concern   • Not on file     Social History Narrative       Family History:     Family History   Problem Relation Age of Onset   • No Known Problems Mother    • No Known Problems Father    • No Known Problems Sister    • No Known Problems Brother    • No Known Problems Son    • No Known Problems  Daughter    • No Known Problems Maternal Grandmother    • No Known Problems Maternal Grandfather    • No Known Problems Paternal Grandmother    • No Known Problems Paternal Grandfather    • No Known Problems Cousin    • Rheum arthritis Neg Hx    • Osteoarthritis Neg Hx    • Asthma Neg Hx    • Diabetes Neg Hx    • Heart failure Neg Hx    • Hyperlipidemia Neg Hx    • Hypertension Neg Hx    • Migraines Neg Hx    • Rashes / Skin problems Neg Hx    • Seizures Neg Hx    • Stroke Neg Hx    • Thyroid disease Neg Hx        Review of Systems:     Review of Systems   Constitutional: Negative for chills, fatigue and fever.   Respiratory: Negative for cough, shortness of breath and wheezing.    Cardiovascular: Negative for leg swelling.   Gastrointestinal: Negative for abdominal pain, nausea and vomiting.   Musculoskeletal: Negative for back pain and joint swelling.   Neurological: Negative for dizziness and headaches.   Psychiatric/Behavioral: Negative for confusion.       Physical Exam:     Physical Exam   Constitutional: She is oriented to person, place, and time. She appears well-developed and well-nourished. No distress.   Abdominal: Soft. Bowel sounds are normal. She exhibits no distension and no mass. There is no tenderness. There is no rebound and no guarding. No hernia.   Musculoskeletal: Normal range of motion.   Neurological: She is alert and oriented to person, place, and time.   Skin: Skin is warm and dry. No rash noted. She is not diaphoretic. No erythema. No pallor.   Psychiatric: She has a normal mood and affect. Her behavior is normal. Judgment and thought content normal.   Nursing note and vitals reviewed.      Procedure:     No notes on file      Assessment:     Encounter Diagnoses   Name Primary?   • Adnexal cyst Yes   • Left lower quadrant pain      No orders of the defined types were placed in this encounter.      Plan:   Reviewed the CT scan of the abdomen and pelvis with the patient revealing a decrease  in the size of the left next cystoscopy 5 cm to that of 1.6 cm. Since the patient continues to complain of pain in the left lower quadrant, has history of tobacco use and a family history of cancer will recommend she have a cystoscopy by Dr. Cullen Herbert for further evaluation. Discussed procedure in detail with patient and she is agreeable.    Counseling was given to patient for the following topics diagnostic results, patient and family education, impressions and risks and benefits of treatment options. and the interim medical history and current results were reviewed.  A treatment plan with follow-up was made. Total time of the encounter was 18 minutes and 18 minutes were spent discussing Adnexal cyst [N94.9] face-to-face.       This document has been electronically signed by PALOMO Polanco June 29, 2017 4:43 PM

## 2017-07-31 ENCOUNTER — PROCEDURE VISIT (OUTPATIENT)
Dept: UROLOGY | Facility: CLINIC | Age: 47
End: 2017-07-31

## 2017-07-31 VITALS
HEART RATE: 73 BPM | BODY MASS INDEX: 49.69 KG/M2 | SYSTOLIC BLOOD PRESSURE: 134 MMHG | HEIGHT: 62 IN | DIASTOLIC BLOOD PRESSURE: 89 MMHG | WEIGHT: 270 LBS

## 2017-07-31 DIAGNOSIS — N30.10 INTERSTITIAL CYSTITIS: ICD-10-CM

## 2017-07-31 DIAGNOSIS — R10.9 ABDOMINAL PAIN, UNSPECIFIED LOCATION: Primary | ICD-10-CM

## 2017-07-31 LAB
BILIRUB BLD-MCNC: NEGATIVE MG/DL
CLARITY, POC: CLEAR
COLOR UR: YELLOW
GLUCOSE UR STRIP-MCNC: NEGATIVE MG/DL
KETONES UR QL: NEGATIVE
LEUKOCYTE EST, POC: NEGATIVE
NITRITE UR-MCNC: NEGATIVE MG/ML
PH UR: 5 [PH] (ref 5–8)
PROT UR STRIP-MCNC: NEGATIVE MG/DL
RBC # UR STRIP: NEGATIVE /UL
SP GR UR: 1.03 (ref 1–1.03)
UROBILINOGEN UR QL: NORMAL

## 2017-07-31 PROCEDURE — 99213 OFFICE O/P EST LOW 20 MIN: CPT | Performed by: UROLOGY

## 2017-07-31 PROCEDURE — 52000 CYSTOURETHROSCOPY: CPT | Performed by: UROLOGY

## 2017-07-31 PROCEDURE — 96372 THER/PROPH/DIAG INJ SC/IM: CPT | Performed by: UROLOGY

## 2017-07-31 RX ORDER — GENTAMICIN SULFATE 40 MG/ML
80 INJECTION, SOLUTION INTRAMUSCULAR; INTRAVENOUS ONCE
Status: COMPLETED | OUTPATIENT
Start: 2017-07-31 | End: 2017-07-31

## 2017-07-31 RX ADMIN — GENTAMICIN SULFATE 80 MG: 40 INJECTION, SOLUTION INTRAMUSCULAR; INTRAVENOUS at 09:38

## 2017-08-01 PROBLEM — N30.10 INTERSTITIAL CYSTITIS: Status: ACTIVE | Noted: 2017-08-01

## 2017-08-07 ENCOUNTER — APPOINTMENT (OUTPATIENT)
Dept: PREADMISSION TESTING | Facility: HOSPITAL | Age: 47
End: 2017-08-07

## 2017-08-07 LAB
ANION GAP SERPL CALCULATED.3IONS-SCNC: 6.8 MMOL/L (ref 3.6–11.2)
BUN BLD-MCNC: 17 MG/DL (ref 7–21)
BUN/CREAT SERPL: 27 (ref 7–25)
CALCIUM SPEC-SCNC: 9.4 MG/DL (ref 7.7–10)
CHLORIDE SERPL-SCNC: 107 MMOL/L (ref 99–112)
CO2 SERPL-SCNC: 24.2 MMOL/L (ref 24.3–31.9)
CREAT BLD-MCNC: 0.63 MG/DL (ref 0.43–1.29)
DEPRECATED RDW RBC AUTO: 46.6 FL (ref 37–54)
ERYTHROCYTE [DISTWIDTH] IN BLOOD BY AUTOMATED COUNT: 14.3 % (ref 11.5–14.5)
GFR SERPL CREATININE-BSD FRML MDRD: 102 ML/MIN/1.73
GLUCOSE BLD-MCNC: 128 MG/DL (ref 70–110)
HCT VFR BLD AUTO: 43 % (ref 37–47)
HGB BLD-MCNC: 14.1 G/DL (ref 12–16)
MCH RBC QN AUTO: 30 PG (ref 27–33)
MCHC RBC AUTO-ENTMCNC: 32.8 G/DL (ref 33–37)
MCV RBC AUTO: 91.5 FL (ref 80–94)
OSMOLALITY SERPL CALC.SUM OF ELEC: 278.9 MOSM/KG (ref 273–305)
PLATELET # BLD AUTO: 333 10*3/MM3 (ref 130–400)
PMV BLD AUTO: 11.5 FL (ref 6–10)
POTASSIUM BLD-SCNC: 4.2 MMOL/L (ref 3.5–5.3)
RBC # BLD AUTO: 4.7 10*6/MM3 (ref 4.2–5.4)
SODIUM BLD-SCNC: 138 MMOL/L (ref 135–153)
WBC NRBC COR # BLD: 7.5 10*3/MM3 (ref 4.5–12.5)

## 2017-08-07 PROCEDURE — 80048 BASIC METABOLIC PNL TOTAL CA: CPT | Performed by: ANESTHESIOLOGY

## 2017-08-07 PROCEDURE — 36415 COLL VENOUS BLD VENIPUNCTURE: CPT

## 2017-08-07 PROCEDURE — 85027 COMPLETE CBC AUTOMATED: CPT | Performed by: ANESTHESIOLOGY

## 2017-08-07 RX ORDER — SIMVASTATIN 10 MG
10 TABLET ORAL NIGHTLY
COMMUNITY
End: 2018-08-29

## 2017-08-07 NOTE — DISCHARGE INSTRUCTIONS
SURGERY DATE 8/9/2017  CALL DR BUSH OFFICE FOR TIME  TAKE the following medications the morning of surgery:  All heart or blood pressure medications    HOLD all diabetic medications the morning of surgery as ordered by physician.    Please discontinue all blood thinners and anticoagulants (except aspirin) prior to surgery as per your surgeon and cardiologist instructions.  Aspirin may be continued up to the day prior to surgery.     General Instructions:  · Do not eat or drink after midnight: includes water, mints, or gum. You may brush your teeth.  Dental appliances that are removable must be taken out day of surgery.  · Do not smoke, chew tobacco, or drink alcohol.  · Bring medications in original bottles, any inhalers and if applicable your C-PAP/BI-PAP machine.  · Bring any papers given to you in the doctor's office.  · Wear clean comfortable clothes and socks.  · Do not wear contact lenses or make-up. Bring a case for your glasses if applicable.  · Bring crutches or walker if applicable.  · Leave all other valuables and jewelry at home.    If you were given a blood bank ID arm band remember to bring it with you the day of surgery.    Preventing a Surgical Site Infection:  Shower on the morning of surgery using a fresh bar of anti-bacterial soap (such as Dial) and clean washcloth. Dry with a clean towel and dress in clean clothing.  For 2 to 3 days before surgery, avoid shaving with a razor near where you will have surgery because the razor can irritate skin and make it easier to develop an infection. Ask your surgeon if you will be receiving antibiotics prior to surgery.  Make sure you, your family, and all healthcare providers clear their hands with soap and water or an alcohol-based hand  before caring for you or your wound.  If at all possible, quit smoking as many days before surgery as you can.    Day of surgery:  Upon arrival, a Pre-op nurse and Anesthesiologist will review your health  history, obtain vital signs, and answer questions you may have. The only belongings needed at this time will be your home medications and if applicable your C-PAP/BI-PAP machine. If you are staying overnight your family can leave the rest of your belongings in the car and bring them to your room later. A Pre-op nurse will start an IV and you may receive medication in preparation for surgery, including something to help you relax. Your family will be able to see you in the Pre-op area. While you are in surgery your family should notify the waiting room  if they leave the waiting room area and provide a contact phone number.    Please be aware that surgery does come with discomfort. We want to make every effort to control your discomfort so please discuss any uncontrolled symptoms with your nurse. Your doctor will most likely have prescribed pain medications.    If you are going home after surgery you will receive individualized written care instructions before being discharged. A responsible adult must drive you to and from the hospital on the day of surgery and stay with you for 24 hours.    If you are staying overnight following surgery, you will be transported to your hospital room following the recovery period.  [facility] has all private rooms.    If you have any questions please call Pre-Admission Testing at [phone].  Deductibles and co-payments are collected on the day of service. Please be prepared to pay the required co-pay, deductible or deposit on the day of service as defined by your plan.

## 2017-08-09 ENCOUNTER — ANESTHESIA EVENT (OUTPATIENT)
Dept: PERIOP | Facility: HOSPITAL | Age: 47
End: 2017-08-09

## 2017-08-09 ENCOUNTER — ANESTHESIA (OUTPATIENT)
Dept: PERIOP | Facility: HOSPITAL | Age: 47
End: 2017-08-09

## 2017-08-09 ENCOUNTER — HOSPITAL ENCOUNTER (OUTPATIENT)
Facility: HOSPITAL | Age: 47
Discharge: HOME | End: 2017-08-09
Attending: UROLOGY | Admitting: UROLOGY

## 2017-08-09 VITALS
HEIGHT: 62 IN | BODY MASS INDEX: 49.69 KG/M2 | OXYGEN SATURATION: 98 % | SYSTOLIC BLOOD PRESSURE: 102 MMHG | RESPIRATION RATE: 18 BRPM | HEART RATE: 62 BPM | DIASTOLIC BLOOD PRESSURE: 56 MMHG | WEIGHT: 270 LBS | TEMPERATURE: 97.9 F

## 2017-08-09 DIAGNOSIS — N30.10 INTERSTITIAL CYSTITIS: ICD-10-CM

## 2017-08-09 PROBLEM — R10.9 ABDOMINAL PAIN: Status: ACTIVE | Noted: 2017-08-09

## 2017-08-09 PROCEDURE — 25010000002 PROPOFOL 1000 MG/ML EMULSION: Performed by: NURSE ANESTHETIST, CERTIFIED REGISTERED

## 2017-08-09 PROCEDURE — 25010000002 GENTAMICIN PER 80 MG: Performed by: UROLOGY

## 2017-08-09 PROCEDURE — 25010000002 FENTANYL CITRATE (PF) 100 MCG/2ML SOLUTION: Performed by: NURSE ANESTHETIST, CERTIFIED REGISTERED

## 2017-08-09 PROCEDURE — 52260 CYSTOSCOPY AND TREATMENT: CPT | Performed by: UROLOGY

## 2017-08-09 PROCEDURE — 25010000002 PROPOFOL 10 MG/ML EMULSION: Performed by: NURSE ANESTHETIST, CERTIFIED REGISTERED

## 2017-08-09 RX ORDER — SODIUM CHLORIDE 0.9 % (FLUSH) 0.9 %
1-10 SYRINGE (ML) INJECTION AS NEEDED
Status: DISCONTINUED | OUTPATIENT
Start: 2017-08-09 | End: 2017-08-09 | Stop reason: HOSPADM

## 2017-08-09 RX ORDER — PROPOFOL 10 MG/ML
VIAL (ML) INTRAVENOUS AS NEEDED
Status: DISCONTINUED | OUTPATIENT
Start: 2017-08-09 | End: 2017-08-09 | Stop reason: SURG

## 2017-08-09 RX ORDER — IPRATROPIUM BROMIDE AND ALBUTEROL SULFATE 2.5; .5 MG/3ML; MG/3ML
3 SOLUTION RESPIRATORY (INHALATION) ONCE AS NEEDED
Status: DISCONTINUED | OUTPATIENT
Start: 2017-08-09 | End: 2017-08-09 | Stop reason: HOSPADM

## 2017-08-09 RX ORDER — FENTANYL CITRATE 50 UG/ML
50 INJECTION, SOLUTION INTRAMUSCULAR; INTRAVENOUS
Status: DISCONTINUED | OUTPATIENT
Start: 2017-08-09 | End: 2017-08-09 | Stop reason: HOSPADM

## 2017-08-09 RX ORDER — MEPERIDINE HYDROCHLORIDE 25 MG/ML
12.5 INJECTION INTRAMUSCULAR; INTRAVENOUS; SUBCUTANEOUS
Status: DISCONTINUED | OUTPATIENT
Start: 2017-08-09 | End: 2017-08-09 | Stop reason: HOSPADM

## 2017-08-09 RX ORDER — SODIUM CHLORIDE, SODIUM LACTATE, POTASSIUM CHLORIDE, CALCIUM CHLORIDE 600; 310; 30; 20 MG/100ML; MG/100ML; MG/100ML; MG/100ML
125 INJECTION, SOLUTION INTRAVENOUS CONTINUOUS
Status: DISCONTINUED | OUTPATIENT
Start: 2017-08-09 | End: 2017-08-09 | Stop reason: HOSPADM

## 2017-08-09 RX ORDER — MAGNESIUM HYDROXIDE 1200 MG/15ML
LIQUID ORAL AS NEEDED
Status: DISCONTINUED | OUTPATIENT
Start: 2017-08-09 | End: 2017-08-09 | Stop reason: HOSPADM

## 2017-08-09 RX ORDER — HYDROCODONE BITARTRATE AND ACETAMINOPHEN 10; 325 MG/1; MG/1
1 TABLET ORAL EVERY 4 HOURS PRN
Qty: 21 TABLET | Refills: 0 | Status: SHIPPED | OUTPATIENT
Start: 2017-08-09 | End: 2017-12-05

## 2017-08-09 RX ORDER — LIDOCAINE HYDROCHLORIDE 20 MG/ML
INJECTION, SOLUTION INFILTRATION; PERINEURAL AS NEEDED
Status: DISCONTINUED | OUTPATIENT
Start: 2017-08-09 | End: 2017-08-09 | Stop reason: SURG

## 2017-08-09 RX ORDER — ONDANSETRON 2 MG/ML
4 INJECTION INTRAMUSCULAR; INTRAVENOUS ONCE AS NEEDED
Status: DISCONTINUED | OUTPATIENT
Start: 2017-08-09 | End: 2017-08-09 | Stop reason: HOSPADM

## 2017-08-09 RX ORDER — FENTANYL CITRATE 50 UG/ML
INJECTION, SOLUTION INTRAMUSCULAR; INTRAVENOUS AS NEEDED
Status: DISCONTINUED | OUTPATIENT
Start: 2017-08-09 | End: 2017-08-09 | Stop reason: SURG

## 2017-08-09 RX ORDER — MIDAZOLAM HYDROCHLORIDE 1 MG/ML
2 INJECTION INTRAMUSCULAR; INTRAVENOUS ONCE
Status: DISCONTINUED | OUTPATIENT
Start: 2017-08-09 | End: 2017-08-09

## 2017-08-09 RX ORDER — LISINOPRIL 20 MG/1
20 TABLET ORAL DAILY
COMMUNITY
End: 2018-02-13

## 2017-08-09 RX ADMIN — LIDOCAINE HYDROCHLORIDE 100 MG: 20 INJECTION, SOLUTION INFILTRATION; PERINEURAL at 12:15

## 2017-08-09 RX ADMIN — FENTANYL CITRATE 100 MCG: 50 INJECTION INTRAMUSCULAR; INTRAVENOUS at 12:12

## 2017-08-09 RX ADMIN — GENTAMICIN SULFATE 80 MG: 40 INJECTION, SOLUTION INTRAMUSCULAR; INTRAVENOUS at 12:18

## 2017-08-09 RX ADMIN — PROPOFOL 30 MG: 10 INJECTION, EMULSION INTRAVENOUS at 12:15

## 2017-08-09 RX ADMIN — SODIUM CHLORIDE, POTASSIUM CHLORIDE, SODIUM LACTATE AND CALCIUM CHLORIDE 125 ML/HR: 600; 310; 30; 20 INJECTION, SOLUTION INTRAVENOUS at 11:22

## 2017-08-09 RX ADMIN — PROPOFOL 300 MCG/KG/MIN: 10 INJECTION, EMULSION INTRAVENOUS at 12:15

## 2017-08-09 NOTE — ANESTHESIA PREPROCEDURE EVALUATION
Anesthesia Evaluation     history of anesthetic complications: PONV  NPO Solid Status: > 8 hours  NPO Liquid Status: > 8 hours     Airway   Mallampati: II  TM distance: >3 FB  Neck ROM: full  no difficulty expected  Dental    (+) poor dentition    Pulmonary - normal exam   (+) asthma, sleep apnea,   Cardiovascular - normal exam    (+) hypertension, valvular problems/murmurs, hyperlipidemia      Neuro/Psych  GI/Hepatic/Renal/Endo    (+) obesity,      Musculoskeletal     Abdominal  - normal exam   Substance History      OB/GYN          Other                                        Anesthesia Plan    ASA 3     general     intravenous induction   Anesthetic plan and risks discussed with patient.

## 2017-08-09 NOTE — H&P (VIEW-ONLY)
Chief Complaint:          Chief Complaint   Patient presents with   • ADREXAL CYST     DIAGNOSTIC CYSTOSCOPY   • LOWER QUADRANT PAIN       HPI:   47 y.o. female.    47-year-old white female referred for cystoscopy.  She has a history of a large adnexal cyst this now normal in size but severe bladder pain, pelvic pain, frequency, urgency.  No fevers chills and no demonstrable urinary tract infection.      Past Medical History:        Past Medical History:   Diagnosis Date   • Anxiety    • Arthritis    • Asthma    • Depression    • Depression    • Hyperlipidemia    • Hypertension    • Mitral valve regurgitation    • PONV (postoperative nausea and vomiting)    • Sleep apnea          Current Meds:     Current Outpatient Prescriptions   Medication Sig Dispense Refill   • albuterol (PROVENTIL HFA;VENTOLIN HFA) 108 (90 BASE) MCG/ACT inhaler Inhale 2 puffs every 4 (four) hours as needed for wheezing     • aspirin 81 MG EC tablet Take 81 mg by mouth Daily.     • atorvastatin (LIPITOR) 20 MG tablet Take 20 mg by mouth Daily.     • cetirizine (ZyrTEC) 10 MG tablet Take 10 mg by mouth daily     • FLUoxetine (PROzac) 20 MG capsule Take 40 mg by mouth 2 (two) times a day     • ibuprofen (ADVIL,MOTRIN) 800 MG tablet Take 1 tablet by mouth 3 (Three) Times a Day. 60 tablet 0   • lisinopril-hydrochlorothiazide (PRINZIDE,ZESTORETIC) 20-25 MG per tablet Take 1 tablet by mouth Daily. Takes with Lisinopril 20mg. Total of 40 mg or lisinopril daily.     • metoprolol succinate XL (TOPROL-XL) 50 MG 24 hr tablet Take 50 mg by mouth daily     • montelukast (SINGULAIR) 10 MG tablet Take 10 mg by mouth Daily.     • omeprazole (priLOSEC) 20 MG capsule Take 1 capsule by mouth Daily. 30 capsule 0     No current facility-administered medications for this visit.         Allergies:      Allergies   Allergen Reactions   • Amlodipine GI Intolerance and Arrhythmia   • Morphine And Related Itching   • Nifedipine GI Intolerance and Arrhythmia     Adalat,  procardia   • Diazepam Anxiety   • Midazolam Anxiety   • Sulfa Antibiotics Rash        Past Surgical History:     Past Surgical History:   Procedure Laterality Date   • APPENDECTOMY     • CARDIAC CATHETERIZATION     •  SECTION     • COLONOSCOPY     • DIAGNOSTIC LAPAROSCOPY N/A 10/14/2016    Procedure: DIAGNOSTIC LAPAROSCOPY POSSIBLE RIGHT SALPINGOOPHORECTOMY;  Surgeon: Rory Owens DO;  Location: [facility] OR;  Service:    • FOOT SURGERY     • HYSTERECTOMY     • MOUTH SURGERY     • TRACHELECTOMY N/A 10/14/2016    Procedure: TRACHELECTOMY VAGINAL;  Surgeon: Rory Owens DO;  Location: [facility] OR;  Service:    • TUBAL ABDOMINAL LIGATION     • UMBILICAL HERNIA REPAIR N/A 10/14/2016    Procedure: UMBILICAL HERNIA REPAIR;  Surgeon: Spike Porter MD;  Location: [facility] OR;  Service:    • UMBILICAL HERNIA REPAIR N/A 2016    Procedure: UMBILICAL HERNIA REPAIR;  Surgeon: Spike Porter MD;  Location: [facility] OR;  Service:    • WISDOM TOOTH EXTRACTION           Social History:     Social History     Social History   • Marital status:      Spouse name: N/A   • Number of children: N/A   • Years of education: N/A     Occupational History   • Not on file.     Social History Main Topics   • Smoking status: Former Smoker     Packs/day: 0.50     Years: 10.00     Quit date:    • Smokeless tobacco: Never Used   • Alcohol use No      Comment: not often   • Drug use: No   • Sexual activity: Defer     Other Topics Concern   • Not on file     Social History Narrative       Family History:     Family History   Problem Relation Age of Onset   • No Known Problems Mother    • No Known Problems Father    • No Known Problems Sister    • No Known Problems Brother    • No Known Problems Son    • No Known Problems Daughter    • No Known Problems Maternal Grandmother    • No Known Problems Maternal Grandfather    • No Known Problems Paternal Grandmother    • No Known Problems Paternal Grandfather     • No Known Problems Cousin    • Rheum arthritis Neg Hx    • Osteoarthritis Neg Hx    • Asthma Neg Hx    • Diabetes Neg Hx    • Heart failure Neg Hx    • Hyperlipidemia Neg Hx    • Hypertension Neg Hx    • Migraines Neg Hx    • Rashes / Skin problems Neg Hx    • Seizures Neg Hx    • Stroke Neg Hx    • Thyroid disease Neg Hx        Review of Systems:     Review of Systems   Constitutional: Negative.  Negative for activity change, appetite change, chills, diaphoresis, fatigue and unexpected weight change.   HENT: Negative for congestion, dental problem, drooling, ear discharge, ear pain, facial swelling, hearing loss, mouth sores, nosebleeds, postnasal drip, rhinorrhea, sinus pressure, sneezing, sore throat, tinnitus, trouble swallowing and voice change.    Eyes: Negative.  Negative for photophobia, pain, discharge, redness, itching and visual disturbance.   Respiratory: Negative.  Negative for apnea, cough, choking, chest tightness, shortness of breath, wheezing and stridor.    Cardiovascular: Negative.  Negative for chest pain, palpitations and leg swelling.   Gastrointestinal: Negative.  Negative for abdominal distention, abdominal pain, anal bleeding, blood in stool, constipation, diarrhea, nausea, rectal pain and vomiting.   Endocrine: Negative.  Negative for cold intolerance, heat intolerance, polydipsia, polyphagia and polyuria.   Genitourinary: Positive for dyspareunia, frequency, urgency and vaginal pain.   Musculoskeletal: Negative.  Negative for arthralgias, back pain, gait problem, joint swelling, myalgias, neck pain and neck stiffness.   Skin: Negative.  Negative for color change, pallor, rash and wound.   Allergic/Immunologic: Negative.  Negative for environmental allergies, food allergies and immunocompromised state.   Neurological: Negative.  Negative for dizziness, tremors, seizures, syncope, facial asymmetry, speech difficulty, weakness, light-headedness, numbness and headaches.   Hematological:  Negative.  Negative for adenopathy. Does not bruise/bleed easily.   Psychiatric/Behavioral: Negative for agitation, behavioral problems, confusion, decreased concentration, dysphoric mood, hallucinations, self-injury, sleep disturbance and suicidal ideas. The patient is not nervous/anxious and is not hyperactive.    All other systems reviewed and are negative.      Physical Exam:     Physical Exam   Constitutional: She appears well-developed and well-nourished.   HENT:   Head: Normocephalic and atraumatic.   Right Ear: External ear normal.   Left Ear: External ear normal.   Mouth/Throat: Oropharynx is clear and moist.   Eyes: Conjunctivae are normal. Pupils are equal, round, and reactive to light.   Cardiovascular: Normal rate, regular rhythm, normal heart sounds and intact distal pulses.    Pulmonary/Chest: Effort normal and breath sounds normal.   Abdominal: Soft. Bowel sounds are normal. She exhibits no distension and no mass. There is no tenderness. There is no rebound and no guarding.   Genitourinary: Vagina normal. No vaginal discharge found.   Musculoskeletal: Normal range of motion.   Neurological: She is alert. She has normal reflexes.   Skin: Skin is warm and dry.   Psychiatric: She has a normal mood and affect. Her behavior is normal. Judgment and thought content normal.       Procedure:   Patient presents today for cystourethroscopy.  After prep and drape in a sterile fashion in the low dorsal lithotomy position the urethra was gently anesthetized with 10 cc of 2% viscous Xylocaine jelly.  After an appropriate period of topical anesthesia I used the flexible cystoscope to examine the anterior through which was completely normal the ureteral orifices were visualized in normal position and configuration there were no stones, tumors or foreign bodies.  The patient was very uncomfortable during the filling and I believe this is consistent with interstitial cystitis I'm recommending an anesthetic cystoscopy  and dilatation The patient was given gentamicin as prophylaxis for the cystoscopy and released from the clinic    Assessment:     Encounter Diagnosis   Name Primary?   • Abdominal pain, unspecified location Yes     Orders Placed This Encounter   Procedures   • POC Urinalysis Dipstick       Plan:   Interstitial cystitis-I'm recommending an anesthetic cystoscopy and bladder dilatation.Interstitial cystitis-we discussed the diagnosis and management of this condition.  I indicated that it was a multifactorial condition with multifactorial symptomatology.  Including frequency, urgency, the sensation of urinary tract infection in the absence of a positive culture, sexual symptomatology including significant dyspareunia.  We discussed treatment options including the importance of making a clinical diagnosis and the cystoscopic findings including Hunner's ulcers etc.  We also discussed medical management including pharmacologic treatment such as amitriptyline, naturopathic treatments such as pumpkin oil and which more aggressive options of Botox injections as well as the relative risks and merits of this.           This document has been electronically signed by PORTER BUSH MD July 31, 2017 9:38 AM

## 2017-08-09 NOTE — ANESTHESIA POSTPROCEDURE EVALUATION
Patient: Shaun Morales    Procedure Summary     Date Anesthesia Start Anesthesia Stop Room / Location    08/09/17 1212 1233 [facility] 06 / [facility]       Procedure Diagnosis Surgeon Provider    CYSTOSCOPY BLADDER HYDRODISTENSION (N/A ) Interstitial cystitis  (Interstitial cystitis [N30.10]) MD Kenny Boswell MD          Anesthesia Type: general  Last vitals  BP   102/56 (08/09/17 1340)    Temp   97.9 °F (36.6 °C) (08/09/17 1340)    Pulse   62 (08/09/17 1340)   Resp   18 (08/09/17 1340)    SpO2   98 % (08/09/17 1340)      Post Anesthesia Care and Evaluation    Patient location during evaluation: PHASE II  Patient participation: complete - patient participated  Level of consciousness: awake and alert  Pain score: 1  Pain management: adequate  Airway patency: patent  Anesthetic complications: No anesthetic complications  PONV Status: controlled  Cardiovascular status: acceptable  Respiratory status: acceptable  Hydration status: acceptable

## 2017-08-09 NOTE — OP NOTE
CYSTOSCOPY BLADDER HYDRODISTENSION  Procedure Note    Shaun Morales  8/9/2017    Pre-op Diagnosis:   Interstitial cystitis [N30.10]    Post-op Diagnosis:     Post-Op Diagnosis Codes:     * Interstitial cystitis [N30.10]    Procedure/CPT® Codes:  Cystoscopy and hydraulic bladder distention-39820  Indications: 47-year-old white female with recurrent urinary tract infections with negative cultures recurrent burning, blood, discharge, no upper tract symptomatology no fevers chills I suspect interstitial cystitis she's here for both diagnostic and therapeutic procedure.  Procedure performed: After appropriate informed consent including the risk of anesthesia, bleeding, perforation, damage the bladder, persistent pain she presents for surgery.  After unremarkable general anesthesia was placed the low dorsolithotomy position she had a very well supported anterior and posterior vagina normal urethral meatus and did a careful cystoscopy with a 21 [place]ian cystoscope normal urethra and normal orthotopic orifices easily position configuration and really an unremarkable bladder with no stones tumors or foreign bodies.  I discussed the standard 50 cc/m using a spinal manometric technique to close to 600 cc she had the obvious development of some cracking but not dramatic amount of glomerulations.  He was then repeated ×1 with a bloody effluent consistent with mild to moderate interstitial cystitis she was given pain medication should be seen in the office in 2 weeks for follow-up she was given gentamicin as prophylaxis      Procedure(s):  CYSTOSCOPY BLADDER HYDRODISTENSION    Surgeon(s):  Ion Herbert MD    Anesthesia: General    Staff:   Circulator: Kristopher Velazquez RN  Scrub Person: Shannan Schmidt  Assistant: Sharifa Mayberry    Estimated Blood Loss: *No blood loss documented*  Urine Voided: * No values recorded between 8/9/2017 12:12 PM and 8/9/2017 12:25 PM *    Specimens:                * No specimens in log *      Drains:            Findings: Interstitial cystitis    Complications: None      Ion Herbert MD     Date: 8/9/2017  Time: 12:26 PM

## 2017-08-20 ENCOUNTER — HOSPITAL ENCOUNTER (EMERGENCY)
Facility: HOSPITAL | Age: 47
Discharge: HOME | End: 2017-08-21
Admitting: EMERGENCY MEDICINE

## 2017-08-20 DIAGNOSIS — R11.15 NON-INTRACTABLE CYCLICAL VOMITING WITH NAUSEA: ICD-10-CM

## 2017-08-20 DIAGNOSIS — B34.9 VIRAL ILLNESS: Primary | ICD-10-CM

## 2017-08-20 PROCEDURE — 96361 HYDRATE IV INFUSION ADD-ON: CPT

## 2017-08-20 PROCEDURE — 84484 ASSAY OF TROPONIN QUANT: CPT | Performed by: NURSE PRACTITIONER

## 2017-08-20 PROCEDURE — 87040 BLOOD CULTURE FOR BACTERIA: CPT | Performed by: NURSE PRACTITIONER

## 2017-08-20 PROCEDURE — 83605 ASSAY OF LACTIC ACID: CPT | Performed by: NURSE PRACTITIONER

## 2017-08-20 PROCEDURE — 99283 EMERGENCY DEPT VISIT LOW MDM: CPT

## 2017-08-20 PROCEDURE — 82150 ASSAY OF AMYLASE: CPT | Performed by: NURSE PRACTITIONER

## 2017-08-20 PROCEDURE — 96365 THER/PROPH/DIAG IV INF INIT: CPT

## 2017-08-20 PROCEDURE — 85025 COMPLETE CBC W/AUTO DIFF WBC: CPT | Performed by: NURSE PRACTITIONER

## 2017-08-20 PROCEDURE — 83690 ASSAY OF LIPASE: CPT | Performed by: NURSE PRACTITIONER

## 2017-08-20 PROCEDURE — 93005 ELECTROCARDIOGRAM TRACING: CPT | Performed by: NURSE PRACTITIONER

## 2017-08-20 PROCEDURE — 80053 COMPREHEN METABOLIC PANEL: CPT | Performed by: NURSE PRACTITIONER

## 2017-08-20 PROCEDURE — 25010000002 PROMETHAZINE PER 50 MG: Performed by: NURSE PRACTITIONER

## 2017-08-20 PROCEDURE — 93010 ELECTROCARDIOGRAM REPORT: CPT | Performed by: INTERNAL MEDICINE

## 2017-08-20 RX ORDER — SODIUM CHLORIDE 0.9 % (FLUSH) 0.9 %
10 SYRINGE (ML) INJECTION AS NEEDED
Status: DISCONTINUED | OUTPATIENT
Start: 2017-08-20 | End: 2017-08-21 | Stop reason: HOSPADM

## 2017-08-20 RX ADMIN — PROMETHAZINE HYDROCHLORIDE 12.5 MG: 25 INJECTION INTRAMUSCULAR; INTRAVENOUS at 23:46

## 2017-08-20 RX ADMIN — SODIUM CHLORIDE 1000 ML: 9 INJECTION, SOLUTION INTRAVENOUS at 23:46

## 2017-08-21 ENCOUNTER — APPOINTMENT (OUTPATIENT)
Dept: CT IMAGING | Facility: HOSPITAL | Age: 47
End: 2017-08-21

## 2017-08-21 VITALS
HEIGHT: 62 IN | OXYGEN SATURATION: 98 % | SYSTOLIC BLOOD PRESSURE: 139 MMHG | RESPIRATION RATE: 18 BRPM | DIASTOLIC BLOOD PRESSURE: 78 MMHG | WEIGHT: 270 LBS | TEMPERATURE: 97.6 F | HEART RATE: 99 BPM | BODY MASS INDEX: 49.69 KG/M2

## 2017-08-21 LAB
ALBUMIN SERPL-MCNC: 4.4 G/DL (ref 3.5–5)
ALBUMIN/GLOB SERPL: 1.3 G/DL (ref 1.5–2.5)
ALP SERPL-CCNC: 105 U/L (ref 35–104)
ALT SERPL W P-5'-P-CCNC: 19 U/L (ref 10–36)
AMYLASE SERPL-CCNC: 38 U/L (ref 28–100)
ANION GAP SERPL CALCULATED.3IONS-SCNC: 10.9 MMOL/L (ref 3.6–11.2)
AST SERPL-CCNC: 16 U/L (ref 10–30)
BACTERIA UR QL AUTO: ABNORMAL /HPF
BASOPHILS # BLD AUTO: 0.03 10*3/MM3 (ref 0–0.3)
BASOPHILS NFR BLD AUTO: 0.3 % (ref 0–2)
BILIRUB SERPL-MCNC: 0.2 MG/DL (ref 0.2–1.8)
BILIRUB UR QL STRIP: NEGATIVE
BUN BLD-MCNC: 16 MG/DL (ref 7–21)
BUN/CREAT SERPL: 25 (ref 7–25)
CALCIUM SPEC-SCNC: 9.2 MG/DL (ref 7.7–10)
CHLORIDE SERPL-SCNC: 109 MMOL/L (ref 99–112)
CLARITY UR: CLEAR
CO2 SERPL-SCNC: 19.1 MMOL/L (ref 24.3–31.9)
COLOR UR: YELLOW
CREAT BLD-MCNC: 0.64 MG/DL (ref 0.43–1.29)
D-LACTATE SERPL-SCNC: 2.6 MMOL/L (ref 0.5–2)
DEPRECATED RDW RBC AUTO: 44 FL (ref 37–54)
EOSINOPHIL # BLD AUTO: 0.3 10*3/MM3 (ref 0–0.7)
EOSINOPHIL NFR BLD AUTO: 3.2 % (ref 0–5)
ERYTHROCYTE [DISTWIDTH] IN BLOOD BY AUTOMATED COUNT: 13.6 % (ref 11.5–14.5)
GFR SERPL CREATININE-BSD FRML MDRD: 100 ML/MIN/1.73
GLOBULIN UR ELPH-MCNC: 3.3 GM/DL
GLUCOSE BLD-MCNC: 157 MG/DL (ref 70–110)
GLUCOSE UR STRIP-MCNC: NEGATIVE MG/DL
HCT VFR BLD AUTO: 40.5 % (ref 37–47)
HGB BLD-MCNC: 13.5 G/DL (ref 12–16)
HGB UR QL STRIP.AUTO: NEGATIVE
HYALINE CASTS UR QL AUTO: ABNORMAL /LPF
IMM GRANULOCYTES # BLD: 0.02 10*3/MM3 (ref 0–0.03)
IMM GRANULOCYTES NFR BLD: 0.2 % (ref 0–0.5)
KETONES UR QL STRIP: NEGATIVE
LEUKOCYTE ESTERASE UR QL STRIP.AUTO: NEGATIVE
LIPASE SERPL-CCNC: 34 U/L (ref 13–60)
LYMPHOCYTES # BLD AUTO: 2.84 10*3/MM3 (ref 1–3)
LYMPHOCYTES NFR BLD AUTO: 30.5 % (ref 21–51)
MCH RBC QN AUTO: 30.1 PG (ref 27–33)
MCHC RBC AUTO-ENTMCNC: 33.3 G/DL (ref 33–37)
MCV RBC AUTO: 90.4 FL (ref 80–94)
MONOCYTES # BLD AUTO: 0.64 10*3/MM3 (ref 0.1–0.9)
MONOCYTES NFR BLD AUTO: 6.9 % (ref 0–10)
NEUTROPHILS # BLD AUTO: 5.47 10*3/MM3 (ref 1.4–6.5)
NEUTROPHILS NFR BLD AUTO: 58.9 % (ref 30–70)
NITRITE UR QL STRIP: NEGATIVE
OSMOLALITY SERPL CALC.SUM OF ELEC: 282 MOSM/KG (ref 273–305)
PH UR STRIP.AUTO: 6 [PH] (ref 5–8)
PLATELET # BLD AUTO: 358 10*3/MM3 (ref 130–400)
PMV BLD AUTO: 11.6 FL (ref 6–10)
POTASSIUM BLD-SCNC: 3.6 MMOL/L (ref 3.5–5.3)
PROT SERPL-MCNC: 7.7 G/DL (ref 6–8)
PROT UR QL STRIP: ABNORMAL
RBC # BLD AUTO: 4.48 10*6/MM3 (ref 4.2–5.4)
RBC # UR: ABNORMAL /HPF
REF LAB TEST METHOD: ABNORMAL
SODIUM BLD-SCNC: 139 MMOL/L (ref 135–153)
SP GR UR STRIP: 1.02 (ref 1–1.03)
SQUAMOUS #/AREA URNS HPF: ABNORMAL /HPF
TROPONIN I SERPL-MCNC: <0.006 NG/ML
UROBILINOGEN UR QL STRIP: ABNORMAL
WBC NRBC COR # BLD: 9.3 10*3/MM3 (ref 4.5–12.5)
WBC UR QL AUTO: ABNORMAL /HPF

## 2017-08-21 PROCEDURE — 70450 CT HEAD/BRAIN W/O DYE: CPT | Performed by: RADIOLOGY

## 2017-08-21 PROCEDURE — 81001 URINALYSIS AUTO W/SCOPE: CPT | Performed by: NURSE PRACTITIONER

## 2017-08-21 PROCEDURE — 70450 CT HEAD/BRAIN W/O DYE: CPT

## 2017-08-21 PROCEDURE — 87040 BLOOD CULTURE FOR BACTERIA: CPT | Performed by: NURSE PRACTITIONER

## 2017-08-21 RX ORDER — ONDANSETRON 4 MG/1
4 TABLET, ORALLY DISINTEGRATING ORAL EVERY 6 HOURS PRN
Qty: 15 TABLET | Refills: 0 | Status: SHIPPED | OUTPATIENT
Start: 2017-08-21 | End: 2017-12-05

## 2017-08-21 RX ORDER — PROMETHAZINE HYDROCHLORIDE 25 MG/1
25 TABLET ORAL EVERY 6 HOURS PRN
Qty: 12 TABLET | Refills: 0 | Status: SHIPPED | OUTPATIENT
Start: 2017-08-21 | End: 2017-12-05

## 2017-08-21 NOTE — ED NOTES
Called lab to assist in blood draw per 2 failed attempts at doing so. Spoke to Anusha in lab who said she would send someone up here.      Marianne Mayes  08/21/17 0027

## 2017-08-21 NOTE — DISCHARGE INSTRUCTIONS

## 2017-08-21 NOTE — ED PROVIDER NOTES
Subjective   Patient is a 47 y.o. female presenting with nausea.   History provided by:  Patient  Nausea   The primary symptoms include nausea and vomiting. Primary symptoms do not include fever, abdominal pain, diarrhea or dysuria. The illness began today. The problem has been rapidly worsening.   The illness is also significant for chills. The illness does not include anorexia, dysphagia, back pain or itching.       Review of Systems   Constitutional: Positive for chills. Negative for fever.   HENT: Negative.    Respiratory: Negative.    Cardiovascular: Negative.  Negative for chest pain.   Gastrointestinal: Positive for nausea and vomiting. Negative for abdominal pain, anorexia, diarrhea and dysphagia.   Endocrine: Negative.    Genitourinary: Negative.  Negative for dysuria.   Musculoskeletal: Negative for back pain.   Skin: Negative.  Negative for itching.   Neurological: Negative.    Psychiatric/Behavioral: Negative.    All other systems reviewed and are negative.      Past Medical History:   Diagnosis Date   • Anxiety    • Arthritis    • Asthma    • Bladder pain    • Depression    • Depression    • Heartburn    • Hyperlipidemia    • Hypertension    • Mitral valve regurgitation    • Obstructive sleep apnea on CPAP    • PONV (postoperative nausea and vomiting)    • Sleep apnea    • Umbilical hernia        Allergies   Allergen Reactions   • Amlodipine GI Intolerance and Arrhythmia   • Morphine And Related Itching   • Nifedipine GI Intolerance and Arrhythmia     Adalat, procardia   • Diazepam Anxiety   • Midazolam Anxiety   • Sulfa Antibiotics Rash       Past Surgical History:   Procedure Laterality Date   • APPENDECTOMY     • CARDIAC CATHETERIZATION     •  SECTION     • COLONOSCOPY     • CYSTOSCOPY BLADDER HYDRODISTENSION N/A 2017    Procedure: CYSTOSCOPY BLADDER HYDRODISTENSION;  Surgeon: Ion Herbert MD;  Location: [facility];  Service:    • DIAGNOSTIC LAPAROSCOPY N/A 10/14/2016     Procedure: DIAGNOSTIC LAPAROSCOPY POSSIBLE RIGHT SALPINGOOPHORECTOMY;  Surgeon: Rory Owens DO;  Location:  [facility] OR;  Service:    • FOOT SURGERY     • HYSTERECTOMY  2008   • MOUTH SURGERY     • TRACHELECTOMY N/A 10/14/2016    Procedure: TRACHELECTOMY VAGINAL;  Surgeon: Rory Owens DO;  Location:  [facility] OR;  Service:    • TUBAL ABDOMINAL LIGATION     • UMBILICAL HERNIA REPAIR N/A 10/14/2016    Procedure: UMBILICAL HERNIA REPAIR;  Surgeon: Spike Porter MD;  Location:  [facility] OR;  Service:    • UMBILICAL HERNIA REPAIR N/A 12/9/2016    Procedure: UMBILICAL HERNIA REPAIR;  Surgeon: Spike Porter MD;  Location: [facility] OR;  Service:    • WISDOM TOOTH EXTRACTION         Family History   Problem Relation Age of Onset   • No Known Problems Mother    • No Known Problems Father    • Anesthesia problems Sister    • No Known Problems Brother    • No Known Problems Son    • No Known Problems Daughter    • No Known Problems Maternal Grandmother    • No Known Problems Maternal Grandfather    • No Known Problems Paternal Grandmother    • No Known Problems Paternal Grandfather    • No Known Problems Cousin    • Rheum arthritis Neg Hx    • Osteoarthritis Neg Hx    • Asthma Neg Hx    • Diabetes Neg Hx    • Heart failure Neg Hx    • Hyperlipidemia Neg Hx    • Hypertension Neg Hx    • Migraines Neg Hx    • Rashes / Skin problems Neg Hx    • Seizures Neg Hx    • Stroke Neg Hx    • Thyroid disease Neg Hx        Social History     Social History   • Marital status:      Spouse name: N/A   • Number of children: N/A   • Years of education: N/A     Social History Main Topics   • Smoking status: Former Smoker     Packs/day: 0.50     Years: 10.00     Quit date: 1/1/2013   • Smokeless tobacco: Never Used   • Alcohol use No      Comment: on occassion socially   • Drug use: No   • Sexual activity: Defer     Other Topics Concern   • None     Social History Narrative   • None           Objective   Physical Exam    Constitutional: She is oriented to person, place, and time. She appears well-developed and well-nourished. No distress.   HENT:   Head: Normocephalic and atraumatic.   Right Ear: External ear normal.   Left Ear: External ear normal.   Nose: Nose normal.   Eyes: Conjunctivae and EOM are normal. Pupils are equal, round, and reactive to light.   Neck: Normal range of motion. Neck supple. No JVD present. No tracheal deviation present.   Cardiovascular: Normal rate, regular rhythm and normal heart sounds.    No murmur heard.  Pulmonary/Chest: Effort normal and breath sounds normal. No respiratory distress. She has no wheezes.   Abdominal: Soft. Bowel sounds are normal. There is no tenderness.   Musculoskeletal: Normal range of motion. She exhibits no edema or deformity.   Neurological: She is alert and oriented to person, place, and time. No cranial nerve deficit.   Skin: Skin is warm and dry. No rash noted. She is not diaphoretic. No erythema. No pallor.   Psychiatric: She has a normal mood and affect. Her behavior is normal. Thought content normal.   Nursing note and vitals reviewed.      Procedures         ED Course  ED Course                  MDM  Number of Diagnoses or Management Options  Non-intractable cyclical vomiting with nausea: new and requires workup  Viral illness: new and requires workup     Amount and/or Complexity of Data Reviewed  Clinical lab tests: reviewed  Tests in the radiology section of CPT®: reviewed  Tests in the medicine section of CPT®: reviewed  Independent visualization of images, tracings, or specimens: yes    Risk of Complications, Morbidity, and/or Mortality  Presenting problems: low  Diagnostic procedures: low  Management options: low    Patient Progress  Patient progress: improved      Final diagnoses:   Viral illness   Non-intractable cyclical vomiting with nausea            Marleny Isabel, APRN  08/21/17 0138

## 2017-08-26 LAB
BACTERIA SPEC AEROBE CULT: NORMAL
BACTERIA SPEC AEROBE CULT: NORMAL

## 2017-09-01 ENCOUNTER — HOSPITAL ENCOUNTER (OUTPATIENT)
Dept: RESPIRATORY THERAPY | Facility: HOSPITAL | Age: 47
Discharge: HOME | End: 2017-09-01
Admitting: NURSE PRACTITIONER

## 2017-09-01 DIAGNOSIS — R06.02 SOB (SHORTNESS OF BREATH): ICD-10-CM

## 2017-09-01 PROCEDURE — 94010 BREATHING CAPACITY TEST: CPT

## 2017-09-01 PROCEDURE — 94010 BREATHING CAPACITY TEST: CPT | Performed by: INTERNAL MEDICINE

## 2017-09-01 PROCEDURE — 94727 GAS DIL/WSHOT DETER LNG VOL: CPT

## 2017-09-01 PROCEDURE — 94727 GAS DIL/WSHOT DETER LNG VOL: CPT | Performed by: INTERNAL MEDICINE

## 2017-09-01 PROCEDURE — 94729 DIFFUSING CAPACITY: CPT

## 2017-09-01 PROCEDURE — 94729 DIFFUSING CAPACITY: CPT | Performed by: INTERNAL MEDICINE

## 2017-10-17 ENCOUNTER — HOSPITAL ENCOUNTER (EMERGENCY)
Facility: HOSPITAL | Age: 47
Discharge: HOME | End: 2017-10-17
Attending: EMERGENCY MEDICINE | Admitting: EMERGENCY MEDICINE

## 2017-10-17 VITALS
BODY MASS INDEX: 49.69 KG/M2 | RESPIRATION RATE: 18 BRPM | OXYGEN SATURATION: 100 % | WEIGHT: 270 LBS | HEART RATE: 80 BPM | HEIGHT: 62 IN | TEMPERATURE: 98.6 F | SYSTOLIC BLOOD PRESSURE: 122 MMHG | DIASTOLIC BLOOD PRESSURE: 63 MMHG

## 2017-10-17 DIAGNOSIS — N39.0 URINARY TRACT INFECTION IN FEMALE: Primary | ICD-10-CM

## 2017-10-17 LAB
ALBUMIN SERPL-MCNC: 4.3 G/DL (ref 3.5–5)
ALBUMIN/GLOB SERPL: 1.3 G/DL (ref 1.5–2.5)
ALP SERPL-CCNC: 131 U/L (ref 35–104)
ALT SERPL W P-5'-P-CCNC: 20 U/L (ref 10–36)
ANION GAP SERPL CALCULATED.3IONS-SCNC: 9.6 MMOL/L (ref 3.6–11.2)
AST SERPL-CCNC: 15 U/L (ref 10–30)
BACTERIA UR QL AUTO: ABNORMAL /HPF
BASOPHILS # BLD AUTO: 0.04 10*3/MM3 (ref 0–0.3)
BASOPHILS NFR BLD AUTO: 0.4 % (ref 0–2)
BILIRUB SERPL-MCNC: 0.3 MG/DL (ref 0.2–1.8)
BILIRUB UR QL STRIP: NEGATIVE
BUN BLD-MCNC: 16 MG/DL (ref 7–21)
BUN/CREAT SERPL: 24.2 (ref 7–25)
CALCIUM SPEC-SCNC: 9.4 MG/DL (ref 7.7–10)
CHLORIDE SERPL-SCNC: 107 MMOL/L (ref 99–112)
CLARITY UR: CLEAR
CO2 SERPL-SCNC: 23.4 MMOL/L (ref 24.3–31.9)
COLOR UR: ABNORMAL
CREAT BLD-MCNC: 0.66 MG/DL (ref 0.43–1.29)
DEPRECATED RDW RBC AUTO: 43 FL (ref 37–54)
EOSINOPHIL # BLD AUTO: 0.41 10*3/MM3 (ref 0–0.7)
EOSINOPHIL NFR BLD AUTO: 3.9 % (ref 0–5)
ERYTHROCYTE [DISTWIDTH] IN BLOOD BY AUTOMATED COUNT: 13.2 % (ref 11.5–14.5)
GFR SERPL CREATININE-BSD FRML MDRD: 97 ML/MIN/1.73
GLOBULIN UR ELPH-MCNC: 3.3 GM/DL
GLUCOSE BLD-MCNC: 157 MG/DL (ref 70–110)
GLUCOSE UR STRIP-MCNC: NEGATIVE MG/DL
HCT VFR BLD AUTO: 40.5 % (ref 37–47)
HGB BLD-MCNC: 13.7 G/DL (ref 12–16)
HGB UR QL STRIP.AUTO: ABNORMAL
HYALINE CASTS UR QL AUTO: ABNORMAL /LPF
IMM GRANULOCYTES # BLD: 0.01 10*3/MM3 (ref 0–0.03)
IMM GRANULOCYTES NFR BLD: 0.1 % (ref 0–0.5)
KETONES UR QL STRIP: NEGATIVE
LEUKOCYTE ESTERASE UR QL STRIP.AUTO: ABNORMAL
LYMPHOCYTES # BLD AUTO: 2.07 10*3/MM3 (ref 1–3)
LYMPHOCYTES NFR BLD AUTO: 19.8 % (ref 21–51)
MCH RBC QN AUTO: 30.7 PG (ref 27–33)
MCHC RBC AUTO-ENTMCNC: 33.8 G/DL (ref 33–37)
MCV RBC AUTO: 90.8 FL (ref 80–94)
MONOCYTES # BLD AUTO: 0.88 10*3/MM3 (ref 0.1–0.9)
MONOCYTES NFR BLD AUTO: 8.4 % (ref 0–10)
NEUTROPHILS # BLD AUTO: 7.05 10*3/MM3 (ref 1.4–6.5)
NEUTROPHILS NFR BLD AUTO: 67.4 % (ref 30–70)
NITRITE UR QL STRIP: POSITIVE
OSMOLALITY SERPL CALC.SUM OF ELEC: 283.8 MOSM/KG (ref 273–305)
PH UR STRIP.AUTO: <=5 [PH] (ref 5–8)
PLATELET # BLD AUTO: 312 10*3/MM3 (ref 130–400)
PMV BLD AUTO: 11.4 FL (ref 6–10)
POTASSIUM BLD-SCNC: 4 MMOL/L (ref 3.5–5.3)
PROT SERPL-MCNC: 7.6 G/DL (ref 6–8)
PROT UR QL STRIP: ABNORMAL
RBC # BLD AUTO: 4.46 10*6/MM3 (ref 4.2–5.4)
RBC # UR: ABNORMAL /HPF
REF LAB TEST METHOD: ABNORMAL
SODIUM BLD-SCNC: 140 MMOL/L (ref 135–153)
SP GR UR STRIP: 1.02 (ref 1–1.03)
SQUAMOUS #/AREA URNS HPF: ABNORMAL /HPF
UROBILINOGEN UR QL STRIP: ABNORMAL
WBC NRBC COR # BLD: 10.46 10*3/MM3 (ref 4.5–12.5)
WBC UR QL AUTO: ABNORMAL /HPF

## 2017-10-17 PROCEDURE — 96365 THER/PROPH/DIAG IV INF INIT: CPT

## 2017-10-17 PROCEDURE — 80053 COMPREHEN METABOLIC PANEL: CPT | Performed by: EMERGENCY MEDICINE

## 2017-10-17 PROCEDURE — 81001 URINALYSIS AUTO W/SCOPE: CPT | Performed by: EMERGENCY MEDICINE

## 2017-10-17 PROCEDURE — 25010000002 CEFTRIAXONE: Performed by: EMERGENCY MEDICINE

## 2017-10-17 PROCEDURE — 87086 URINE CULTURE/COLONY COUNT: CPT | Performed by: EMERGENCY MEDICINE

## 2017-10-17 PROCEDURE — 25010000002 KETOROLAC TROMETHAMINE PER 15 MG: Performed by: EMERGENCY MEDICINE

## 2017-10-17 PROCEDURE — 96375 TX/PRO/DX INJ NEW DRUG ADDON: CPT

## 2017-10-17 PROCEDURE — 96367 TX/PROPH/DG ADDL SEQ IV INF: CPT

## 2017-10-17 PROCEDURE — 85025 COMPLETE CBC W/AUTO DIFF WBC: CPT | Performed by: EMERGENCY MEDICINE

## 2017-10-17 PROCEDURE — 25010000002 PROMETHAZINE PER 50 MG: Performed by: EMERGENCY MEDICINE

## 2017-10-17 PROCEDURE — 99283 EMERGENCY DEPT VISIT LOW MDM: CPT

## 2017-10-17 RX ORDER — NITROFURANTOIN 25; 75 MG/1; MG/1
100 CAPSULE ORAL 2 TIMES DAILY
Qty: 14 CAPSULE | Refills: 0 | Status: SHIPPED | OUTPATIENT
Start: 2017-10-17 | End: 2017-10-24

## 2017-10-17 RX ORDER — SODIUM CHLORIDE 9 MG/ML
125 INJECTION, SOLUTION INTRAVENOUS CONTINUOUS
Status: DISCONTINUED | OUTPATIENT
Start: 2017-10-17 | End: 2017-10-17 | Stop reason: HOSPADM

## 2017-10-17 RX ORDER — ONDANSETRON 4 MG/1
4 TABLET, FILM COATED ORAL EVERY 6 HOURS
Qty: 10 TABLET | Refills: 0 | Status: SHIPPED | OUTPATIENT
Start: 2017-10-17 | End: 2018-02-13

## 2017-10-17 RX ORDER — KETOROLAC TROMETHAMINE 30 MG/ML
30 INJECTION, SOLUTION INTRAMUSCULAR; INTRAVENOUS ONCE
Status: COMPLETED | OUTPATIENT
Start: 2017-10-17 | End: 2017-10-17

## 2017-10-17 RX ORDER — SODIUM CHLORIDE 0.9 % (FLUSH) 0.9 %
10 SYRINGE (ML) INJECTION AS NEEDED
Status: DISCONTINUED | OUTPATIENT
Start: 2017-10-17 | End: 2017-10-17 | Stop reason: HOSPADM

## 2017-10-17 RX ADMIN — CEFTRIAXONE 1 G: 1 INJECTION, POWDER, FOR SOLUTION INTRAMUSCULAR; INTRAVENOUS at 08:35

## 2017-10-17 RX ADMIN — KETOROLAC TROMETHAMINE 30 MG: 30 INJECTION, SOLUTION INTRAMUSCULAR at 07:39

## 2017-10-17 RX ADMIN — PROMETHAZINE HYDROCHLORIDE 12.5 MG: 25 INJECTION INTRAMUSCULAR; INTRAVENOUS at 07:42

## 2017-10-17 RX ADMIN — SODIUM CHLORIDE 500 ML: 9 INJECTION, SOLUTION INTRAVENOUS at 07:38

## 2017-10-17 NOTE — DISCHARGE INSTRUCTIONS

## 2017-10-17 NOTE — ED PROVIDER NOTES
Subjective   HPI Comments: Pt comes in with dysuria, frequency.  Now having hematuria.  Has suprapubic pain.  Nausea but no emesis or diarrhea.  No fever.    Patient is a 47 y.o. female presenting with dysuria.   History provided by:  Patient and spouse  Female Dysuria   Pain quality:  Burning and sharp  Pain severity:  Severe  Onset quality:  Gradual  Duration:  1 day  Timing:  Constant  Progression:  Worsening  Chronicity:  Recurrent  Recent urinary tract infections: no    Relieved by:  None tried  Worsened by:  Nothing  Ineffective treatments:  None tried  Urinary symptoms: discolored urine and hematuria    Urinary symptoms: no foul-smelling urine, no frequent urination, no hesitancy and no bladder incontinence    Associated symptoms: nausea    Associated symptoms: no abdominal pain, no fever, no flank pain, no genital lesions, no vaginal discharge and no vomiting    Risk factors: hx of urolithiasis    Risk factors: no hx of pyelonephritis, no kidney transplant, not pregnant, no renal cysts, no renal disease, no sexually transmitted infections, no single kidney and no urinary catheter        Review of Systems   Constitutional: Negative.  Negative for fever.   HENT: Negative.    Eyes: Negative.    Respiratory: Negative.    Cardiovascular: Negative.    Gastrointestinal: Positive for nausea. Negative for abdominal distention, abdominal pain and vomiting.   Endocrine: Negative.    Genitourinary: Positive for difficulty urinating, dysuria, hematuria and urgency. Negative for flank pain and vaginal discharge.   Musculoskeletal: Negative.    Skin: Negative.    Allergic/Immunologic: Negative.    Neurological: Negative.    Hematological: Negative.    Psychiatric/Behavioral: Negative.    All other systems reviewed and are negative.      Past Medical History:   Diagnosis Date   • Anxiety    • Arthritis    • Asthma    • Bladder pain    • Depression    • Depression    • Heartburn    • Hyperlipidemia    • Hypertension    •  Mitral valve regurgitation    • Obstructive sleep apnea on CPAP    • PONV (postoperative nausea and vomiting)    • Sleep apnea    • Umbilical hernia        Allergies   Allergen Reactions   • Amlodipine GI Intolerance and Arrhythmia   • Morphine And Related Itching   • Nifedipine GI Intolerance and Arrhythmia     Adalat, procardia   • Diazepam Anxiety   • Midazolam Anxiety   • Sulfa Antibiotics Rash       Past Surgical History:   Procedure Laterality Date   • APPENDECTOMY     • CARDIAC CATHETERIZATION     •  SECTION     • COLONOSCOPY     • CYSTOSCOPY BLADDER HYDRODISTENSION N/A 2017    Procedure: CYSTOSCOPY BLADDER HYDRODISTENSION;  Surgeon: Ion Herbert MD;  Location: [facility] OR;  Service:    • DIAGNOSTIC LAPAROSCOPY N/A 10/14/2016    Procedure: DIAGNOSTIC LAPAROSCOPY POSSIBLE RIGHT SALPINGOOPHORECTOMY;  Surgeon: Rory Owens DO;  Location: [facility];  Service:    • FOOT SURGERY     • HYSTERECTOMY  2008   • MOUTH SURGERY     • TRACHELECTOMY N/A 10/14/2016    Procedure: TRACHELECTOMY VAGINAL;  Surgeon: Rory Owens DO;  Location: [facility] OR;  Service:    • TUBAL ABDOMINAL LIGATION     • UMBILICAL HERNIA REPAIR N/A 10/14/2016    Procedure: UMBILICAL HERNIA REPAIR;  Surgeon: Spike Porter MD;  Location: [facility] OR;  Service:    • UMBILICAL HERNIA REPAIR N/A 2016    Procedure: UMBILICAL HERNIA REPAIR;  Surgeon: Spike Proter MD;  Location: [facility] OR;  Service:    • WISDOM TOOTH EXTRACTION         Family History   Problem Relation Age of Onset   • No Known Problems Mother    • No Known Problems Father    • Anesthesia problems Sister    • No Known Problems Brother    • No Known Problems Son    • No Known Problems Daughter    • No Known Problems Maternal Grandmother    • No Known Problems Maternal Grandfather    • No Known Problems Paternal Grandmother    • No Known Problems Paternal Grandfather    • No Known Problems Cousin    • Rheum arthritis Neg Hx    • Osteoarthritis  Neg Hx    • Asthma Neg Hx    • Diabetes Neg Hx    • Heart failure Neg Hx    • Hyperlipidemia Neg Hx    • Hypertension Neg Hx    • Migraines Neg Hx    • Rashes / Skin problems Neg Hx    • Seizures Neg Hx    • Stroke Neg Hx    • Thyroid disease Neg Hx        Social History     Social History   • Marital status:      Spouse name: N/A   • Number of children: N/A   • Years of education: N/A     Social History Main Topics   • Smoking status: Former Smoker     Packs/day: 0.50     Years: 10.00     Quit date: 1/1/2013   • Smokeless tobacco: Never Used   • Alcohol use No      Comment: on occassion socially   • Drug use: No   • Sexual activity: Defer     Other Topics Concern   • None     Social History Narrative   • None           Objective   Physical Exam   Constitutional: She is oriented to person, place, and time. She appears well-developed and well-nourished. No distress.   HENT:   Head: Normocephalic and atraumatic.   Right Ear: External ear normal.   Left Ear: External ear normal.   Nose: Nose normal.   Mouth/Throat: Oropharynx is clear and moist.   Eyes: Conjunctivae and EOM are normal. Right eye exhibits no discharge. Left eye exhibits no discharge. No scleral icterus.   Neck: Neck supple. No tracheal deviation present.   Cardiovascular: Normal rate, regular rhythm, normal heart sounds and intact distal pulses.  Exam reveals no gallop and no friction rub.    No murmur heard.  Pulmonary/Chest: Effort normal and breath sounds normal. No stridor. No respiratory distress. She has no wheezes. She has no rales.   Abdominal: Soft. She exhibits no distension and no mass. There is tenderness. There is no guarding.   Suprapubic TTP   Genitourinary:   Genitourinary Comments: R-CVAT   Musculoskeletal: Normal range of motion. She exhibits no tenderness or deformity.   Neurological: She is alert and oriented to person, place, and time. She exhibits normal muscle tone. Coordination normal.   Skin: Skin is warm and dry. No  pallor.   Psychiatric: She has a normal mood and affect. Her behavior is normal. Judgment and thought content normal.   Nursing note and vitals reviewed.      Procedures        Lab Results (last 24 hours)     Procedure Component Value Units Date/Time    CBC & Differential [[id number]] Collected:  10/17/17 0723    Specimen:  Blood Updated:  10/17/17 0735    Narrative:       The following orders were created for panel order CBC & Differential.  Procedure                               Abnormality         Status                     ---------                               -----------         ------                     CBC Auto Differential[[id number]]        Abnormal            Final result                 Please view results for these tests on the individual orders.    Comprehensive Metabolic Panel [[id number]]  (Abnormal) Collected:  10/17/17 0723    Specimen:  Blood from Arm, Left Updated:  10/17/17 0748     Glucose 157 (H) mg/dL      BUN 16 mg/dL      Creatinine 0.66 mg/dL      Sodium 140 mmol/L      Potassium 4.0 mmol/L      Chloride 107 mmol/L      CO2 23.4 (L) mmol/L      Calcium 9.4 mg/dL      Total Protein 7.6 g/dL      Albumin 4.30 g/dL      ALT (SGPT) 20 U/L      AST (SGOT) 15 U/L      Alkaline Phosphatase 131 (H) U/L       Note New Reference Ranges        Total Bilirubin 0.3 mg/dL      eGFR Non African Amer 97 mL/min/1.73      Globulin 3.3 gm/dL      A/G Ratio 1.3 (L) g/dL      BUN/Creatinine Ratio 24.2     Anion Gap 9.6 mmol/L     CBC Auto Differential [[id number]]  (Abnormal) Collected:  10/17/17 0723    Specimen:  Blood from Arm, Left Updated:  10/17/17 0735     WBC 10.46 10*3/mm3      RBC 4.46 10*6/mm3      Hemoglobin 13.7 g/dL      Hematocrit 40.5 %      MCV 90.8 fL      MCH 30.7 pg      MCHC 33.8 g/dL      RDW 13.2 %      RDW-SD 43.0 fl      MPV 11.4 (H) fL      Platelets 312 10*3/mm3      Neutrophil % 67.4 %      Lymphocyte % 19.8 (L) %      Monocyte % 8.4 %      Eosinophil % 3.9 %      Basophil % 0.4 %       Immature Grans % 0.1 %      Neutrophils, Absolute 7.05 (H) 10*3/mm3      Lymphocytes, Absolute 2.07 10*3/mm3      Monocytes, Absolute 0.88 10*3/mm3      Eosinophils, Absolute 0.41 10*3/mm3      Basophils, Absolute 0.04 10*3/mm3      Immature Grans, Absolute 0.01 10*3/mm3     Urinalysis With / Culture If Indicated - Urine, Clean Catch [[id number]]  (Abnormal) Collected:  10/17/17 0743    Specimen:  Urine from Urine, Clean Catch Updated:  10/17/17 0800     Color, UA Dark Yellow (A)     Appearance, UA Clear     pH, UA <=5.0     Specific Gravity, UA 1.021     Glucose, UA Negative     Ketones, UA Negative     Bilirubin, UA Negative     Blood, UA Moderate (2+) (A)     Protein, UA 30 mg/dL (1+) (A)     Leuk Esterase, UA Small (1+) (A)     Nitrite, UA Positive (A)     Urobilinogen, UA 1.0 E.U./dL    Urinalysis, Microscopic Only - Urine, Clean Catch [[id number]]  (Abnormal) Collected:  10/17/17 0743    Specimen:  Urine from Urine, Clean Catch Updated:  10/17/17 0800     RBC, UA 21-30 (A) /HPF      WBC, UA 31-50 (A) /HPF      Bacteria, UA 3+ (A) /HPF      Squamous Epithelial Cells, UA None Seen /HPF      Hyaline Casts, UA None Seen /LPF      Methodology Automated Microscopy    Urine Culture - Urine, Urine, Clean Catch [[id number]] Collected:  10/17/17 0743    Specimen:  Urine from Urine, Clean Catch Updated:  10/17/17 0758            ED Course  ED Course   Comment By Time   Checked out to Dr Baker at morning shift change Ashish Beltre MD 10/17 0757      Assumed care from Dr. Beltre.            MDM    Final diagnoses:   Urinary tract infection in female            Spike Baker MD  10/17/17 0811

## 2017-10-18 ENCOUNTER — HOSPITAL ENCOUNTER (EMERGENCY)
Facility: HOSPITAL | Age: 47
Discharge: HOME | End: 2017-10-18
Attending: EMERGENCY MEDICINE | Admitting: EMERGENCY MEDICINE

## 2017-10-18 VITALS
TEMPERATURE: 97.5 F | RESPIRATION RATE: 16 BRPM | WEIGHT: 270 LBS | BODY MASS INDEX: 44.98 KG/M2 | DIASTOLIC BLOOD PRESSURE: 85 MMHG | HEART RATE: 77 BPM | OXYGEN SATURATION: 98 % | HEIGHT: 65 IN | SYSTOLIC BLOOD PRESSURE: 138 MMHG

## 2017-10-18 DIAGNOSIS — M54.50 ACUTE BILATERAL LOW BACK PAIN WITHOUT SCIATICA: Primary | ICD-10-CM

## 2017-10-18 LAB
BACTERIA UR QL AUTO: ABNORMAL /HPF
BILIRUB UR QL STRIP: NEGATIVE
CLARITY UR: ABNORMAL
COLOR UR: ABNORMAL
GLUCOSE UR STRIP-MCNC: NEGATIVE MG/DL
HGB UR QL STRIP.AUTO: NEGATIVE
HYALINE CASTS UR QL AUTO: ABNORMAL /LPF
KETONES UR QL STRIP: ABNORMAL
LEUKOCYTE ESTERASE UR QL STRIP.AUTO: ABNORMAL
NITRITE UR QL STRIP: POSITIVE
PH UR STRIP.AUTO: <=5 [PH] (ref 5–8)
PROT UR QL STRIP: NEGATIVE
RBC # UR: ABNORMAL /HPF
REF LAB TEST METHOD: ABNORMAL
SP GR UR STRIP: 1.02 (ref 1–1.03)
SQUAMOUS #/AREA URNS HPF: ABNORMAL /HPF
UROBILINOGEN UR QL STRIP: ABNORMAL
WBC UR QL AUTO: ABNORMAL /HPF

## 2017-10-18 PROCEDURE — 87086 URINE CULTURE/COLONY COUNT: CPT | Performed by: NURSE PRACTITIONER

## 2017-10-18 PROCEDURE — 81001 URINALYSIS AUTO W/SCOPE: CPT | Performed by: NURSE PRACTITIONER

## 2017-10-18 PROCEDURE — 87186 SC STD MICRODIL/AGAR DIL: CPT | Performed by: NURSE PRACTITIONER

## 2017-10-18 PROCEDURE — 87077 CULTURE AEROBIC IDENTIFY: CPT | Performed by: NURSE PRACTITIONER

## 2017-10-18 PROCEDURE — 96372 THER/PROPH/DIAG INJ SC/IM: CPT

## 2017-10-18 PROCEDURE — 25010000002 METHYLPREDNISOLONE PER 80 MG: Performed by: NURSE PRACTITIONER

## 2017-10-18 PROCEDURE — 99283 EMERGENCY DEPT VISIT LOW MDM: CPT

## 2017-10-18 PROCEDURE — 25010000002 KETOROLAC TROMETHAMINE PER 15 MG: Performed by: NURSE PRACTITIONER

## 2017-10-18 PROCEDURE — 25010000002 ORPHENADRINE CITRATE PER 60 MG: Performed by: NURSE PRACTITIONER

## 2017-10-18 RX ORDER — KETOROLAC TROMETHAMINE 30 MG/ML
60 INJECTION, SOLUTION INTRAMUSCULAR; INTRAVENOUS ONCE
Status: COMPLETED | OUTPATIENT
Start: 2017-10-18 | End: 2017-10-18

## 2017-10-18 RX ORDER — METHYLPREDNISOLONE ACETATE 80 MG/ML
120 INJECTION, SUSPENSION INTRA-ARTICULAR; INTRALESIONAL; INTRAMUSCULAR; SOFT TISSUE ONCE
Status: COMPLETED | OUTPATIENT
Start: 2017-10-18 | End: 2017-10-18

## 2017-10-18 RX ORDER — ORPHENADRINE CITRATE 30 MG/ML
60 INJECTION INTRAMUSCULAR; INTRAVENOUS ONCE
Status: COMPLETED | OUTPATIENT
Start: 2017-10-18 | End: 2017-10-18

## 2017-10-18 RX ORDER — CYCLOBENZAPRINE HCL 10 MG
10 TABLET ORAL 3 TIMES DAILY PRN
Qty: 12 TABLET | Refills: 0 | Status: SHIPPED | OUTPATIENT
Start: 2017-10-18 | End: 2017-12-05

## 2017-10-18 RX ORDER — HYDROCODONE BITARTRATE AND ACETAMINOPHEN 7.5; 325 MG/1; MG/1
1 TABLET ORAL ONCE
Status: COMPLETED | OUTPATIENT
Start: 2017-10-18 | End: 2017-10-18

## 2017-10-18 RX ADMIN — ORPHENADRINE CITRATE 60 MG: 30 INJECTION INTRAMUSCULAR; INTRAVENOUS at 18:07

## 2017-10-18 RX ADMIN — HYDROCODONE BITARTRATE AND ACETAMINOPHEN 1 TABLET: 7.5; 325 TABLET ORAL at 19:04

## 2017-10-18 RX ADMIN — METHYLPREDNISOLONE ACETATE 120 MG: 80 INJECTION, SUSPENSION INTRA-ARTICULAR; INTRALESIONAL; INTRAMUSCULAR; SOFT TISSUE at 18:06

## 2017-10-18 RX ADMIN — KETOROLAC TROMETHAMINE 60 MG: 60 INJECTION, SOLUTION INTRAMUSCULAR at 18:06

## 2017-10-18 NOTE — DISCHARGE INSTRUCTIONS

## 2017-10-18 NOTE — ED PROVIDER NOTES
Subjective   Patient is a 47 y.o. female presenting with back pain.   History provided by:  Patient  Back Pain   Location:  Lumbar spine  Quality:  Aching and stabbing  Pain severity:  Moderate  Pain is:  Same all the time  Onset quality:  Sudden  Timing:  Constant  Progression:  Unchanged  Chronicity:  New  Context: lifting heavy objects    Relieved by:  None tried  Worsened by:  Nothing  Ineffective treatments:  None tried  Associated symptoms: no abdominal pain, no abdominal swelling, no bladder incontinence, no bowel incontinence, no chest pain, no dysuria, no fever, no headaches, no leg pain, no numbness, no paresthesias, no pelvic pain, no perianal numbness and no tingling        Review of Systems   Constitutional: Negative.  Negative for fever.   HENT: Negative.    Respiratory: Negative.    Cardiovascular: Negative.  Negative for chest pain.   Gastrointestinal: Negative.  Negative for abdominal pain and bowel incontinence.   Endocrine: Negative.    Genitourinary: Negative.  Negative for bladder incontinence, dysuria and pelvic pain.   Musculoskeletal: Positive for back pain.   Skin: Negative.    Neurological: Negative.  Negative for tingling, numbness, headaches and paresthesias.   Psychiatric/Behavioral: Negative.    All other systems reviewed and are negative.      Past Medical History:   Diagnosis Date   • Anxiety    • Arthritis    • Asthma    • Bladder pain    • Depression    • Depression    • Heartburn    • Hyperlipidemia    • Hypertension    • Mitral valve regurgitation    • Obstructive sleep apnea on CPAP    • PONV (postoperative nausea and vomiting)    • Sleep apnea    • Umbilical hernia        Allergies   Allergen Reactions   • Amlodipine GI Intolerance and Arrhythmia   • Morphine And Related Itching   • Nifedipine GI Intolerance and Arrhythmia     Adalat, procardia   • Diazepam Anxiety   • Midazolam Anxiety   • Sulfa Antibiotics Rash       Past Surgical History:   Procedure Laterality Date   •  APPENDECTOMY     • CARDIAC CATHETERIZATION     •  SECTION     • COLONOSCOPY     • CYSTOSCOPY BLADDER HYDRODISTENSION N/A 2017    Procedure: CYSTOSCOPY BLADDER HYDRODISTENSION;  Surgeon: Ion Herbert MD;  Location: [facility] OR;  Service:    • DIAGNOSTIC LAPAROSCOPY N/A 10/14/2016    Procedure: DIAGNOSTIC LAPAROSCOPY POSSIBLE RIGHT SALPINGOOPHORECTOMY;  Surgeon: Rory Owens DO;  Location: [facility] OR;  Service:    • FOOT SURGERY     • HYSTERECTOMY     • MOUTH SURGERY     • TRACHELECTOMY N/A 10/14/2016    Procedure: TRACHELECTOMY VAGINAL;  Surgeon: Rory Owens DO;  Location: [facility] OR;  Service:    • TUBAL ABDOMINAL LIGATION     • UMBILICAL HERNIA REPAIR N/A 10/14/2016    Procedure: UMBILICAL HERNIA REPAIR;  Surgeon: Spike Porter MD;  Location: [facility] OR;  Service:    • UMBILICAL HERNIA REPAIR N/A 2016    Procedure: UMBILICAL HERNIA REPAIR;  Surgeon: Spike Porter MD;  Location: [facility] OR;  Service:    • WISDOM TOOTH EXTRACTION         Family History   Problem Relation Age of Onset   • No Known Problems Mother    • No Known Problems Father    • Anesthesia problems Sister    • No Known Problems Brother    • No Known Problems Son    • No Known Problems Daughter    • No Known Problems Maternal Grandmother    • No Known Problems Maternal Grandfather    • No Known Problems Paternal Grandmother    • No Known Problems Paternal Grandfather    • No Known Problems Cousin    • Rheum arthritis Neg Hx    • Osteoarthritis Neg Hx    • Asthma Neg Hx    • Diabetes Neg Hx    • Heart failure Neg Hx    • Hyperlipidemia Neg Hx    • Hypertension Neg Hx    • Migraines Neg Hx    • Rashes / Skin problems Neg Hx    • Seizures Neg Hx    • Stroke Neg Hx    • Thyroid disease Neg Hx        Social History     Social History   • Marital status:      Spouse name: N/A   • Number of children: N/A   • Years of education: N/A     Social History Main Topics   • Smoking status: Former Smoker      Packs/day: 0.50     Years: 10.00     Quit date: 1/1/2013   • Smokeless tobacco: Never Used   • Alcohol use No      Comment: on occassion socially   • Drug use: No   • Sexual activity: Defer     Other Topics Concern   • None     Social History Narrative   • None           Objective   Physical Exam   Constitutional: She is oriented to person, place, and time. She appears well-developed and well-nourished. No distress.   HENT:   Head: Normocephalic and atraumatic.   Right Ear: External ear normal.   Left Ear: External ear normal.   Nose: Nose normal.   Eyes: Conjunctivae and EOM are normal. Pupils are equal, round, and reactive to light.   Neck: Normal range of motion. Neck supple. No JVD present. No tracheal deviation present.   Cardiovascular: Normal rate, regular rhythm and normal heart sounds.    No murmur heard.  Pulmonary/Chest: Effort normal and breath sounds normal. No respiratory distress. She has no wheezes.   Abdominal: Soft. Bowel sounds are normal. There is no tenderness.   Musculoskeletal: Normal range of motion. She exhibits no edema or deformity.        Lumbar back: She exhibits tenderness.   Neurological: She is alert and oriented to person, place, and time. No cranial nerve deficit.   Skin: Skin is warm and dry. No rash noted. She is not diaphoretic. No erythema. No pallor.   Psychiatric: She has a normal mood and affect. Her behavior is normal. Thought content normal.   Nursing note and vitals reviewed.      Procedures         ED Course  ED Course                  MDM  Number of Diagnoses or Management Options  Acute bilateral low back pain without sciatica: new and does not require workup     Amount and/or Complexity of Data Reviewed  Clinical lab tests: reviewed  Decide to obtain previous medical records or to obtain history from someone other than the patient: yes    Risk of Complications, Morbidity, and/or Mortality  Presenting problems: low  Diagnostic procedures: low  Management options:  low    Patient Progress  Patient progress: improved      Final diagnoses:   Acute bilateral low back pain without sciatica            Marleny Isabel, APRN  10/18/17 1901

## 2017-10-19 LAB — BACTERIA SPEC AEROBE CULT: NORMAL

## 2017-10-21 LAB
BACTERIA SPEC AEROBE CULT: ABNORMAL
BACTERIA SPEC AEROBE CULT: ABNORMAL

## 2017-11-20 ENCOUNTER — TRANSCRIBE ORDERS (OUTPATIENT)
Dept: ADMINISTRATIVE | Facility: HOSPITAL | Age: 47
End: 2017-11-20

## 2017-11-20 DIAGNOSIS — Z12.31 VISIT FOR SCREENING MAMMOGRAM: Primary | ICD-10-CM

## 2017-12-01 ENCOUNTER — DOCUMENTATION (OUTPATIENT)
Dept: BARIATRICS/WEIGHT MGMT | Facility: CLINIC | Age: 47
End: 2017-12-01

## 2017-12-01 DIAGNOSIS — R73.03 PREDIABETES: ICD-10-CM

## 2017-12-01 DIAGNOSIS — R53.83 FATIGUE, UNSPECIFIED TYPE: ICD-10-CM

## 2017-12-01 DIAGNOSIS — R10.13 DYSPEPSIA: ICD-10-CM

## 2017-12-01 DIAGNOSIS — R06.00 DYSPNEA, UNSPECIFIED TYPE: Primary | ICD-10-CM

## 2017-12-04 DIAGNOSIS — R53.83 FATIGUE, UNSPECIFIED TYPE: ICD-10-CM

## 2017-12-04 DIAGNOSIS — R73.03 PREDIABETES: ICD-10-CM

## 2017-12-04 DIAGNOSIS — R10.13 DYSPEPSIA: ICD-10-CM

## 2017-12-04 DIAGNOSIS — R06.00 DYSPNEA, UNSPECIFIED TYPE: ICD-10-CM

## 2017-12-05 ENCOUNTER — OFFICE VISIT (OUTPATIENT)
Dept: BARIATRICS/WEIGHT MGMT | Facility: CLINIC | Age: 47
End: 2017-12-05

## 2017-12-05 VITALS
BODY MASS INDEX: 50.33 KG/M2 | OXYGEN SATURATION: 99 % | HEART RATE: 84 BPM | SYSTOLIC BLOOD PRESSURE: 132 MMHG | RESPIRATION RATE: 18 BRPM | HEIGHT: 62 IN | WEIGHT: 273.5 LBS | DIASTOLIC BLOOD PRESSURE: 88 MMHG | TEMPERATURE: 97.8 F

## 2017-12-05 DIAGNOSIS — R10.13 DYSPEPSIA: ICD-10-CM

## 2017-12-05 DIAGNOSIS — R06.00 DYSPNEA, UNSPECIFIED TYPE: ICD-10-CM

## 2017-12-05 DIAGNOSIS — K21.9 GASTROESOPHAGEAL REFLUX DISEASE, ESOPHAGITIS PRESENCE NOT SPECIFIED: ICD-10-CM

## 2017-12-05 DIAGNOSIS — R53.83 FATIGUE, UNSPECIFIED TYPE: Primary | ICD-10-CM

## 2017-12-05 DIAGNOSIS — G47.33 OBSTRUCTIVE SLEEP APNEA SYNDROME: ICD-10-CM

## 2017-12-05 DIAGNOSIS — I10 ESSENTIAL HYPERTENSION: ICD-10-CM

## 2017-12-05 DIAGNOSIS — R73.03 PREDIABETES: ICD-10-CM

## 2017-12-05 DIAGNOSIS — J45.909 ASTHMA, UNSPECIFIED ASTHMA SEVERITY, UNSPECIFIED WHETHER COMPLICATED, UNSPECIFIED WHETHER PERSISTENT: ICD-10-CM

## 2017-12-05 DIAGNOSIS — M19.90 OSTEOARTHRITIS, UNSPECIFIED OSTEOARTHRITIS TYPE, UNSPECIFIED SITE: ICD-10-CM

## 2017-12-05 DIAGNOSIS — E78.5 HYPERLIPIDEMIA, UNSPECIFIED HYPERLIPIDEMIA TYPE: ICD-10-CM

## 2017-12-05 PROCEDURE — 99204 OFFICE O/P NEW MOD 45 MIN: CPT | Performed by: SURGERY

## 2017-12-05 RX ORDER — LUBIPROSTONE 8 UG/1
8 CAPSULE ORAL
COMMUNITY
End: 2018-05-29

## 2017-12-05 NOTE — PROGRESS NOTES
[facility] BARIATRIC SURGERY  [address]  [facility] [address]  [phone]      Patient  Name:  Shaun Morales  :  1970      Date of Visit: 2017      Chief Complaint:  weight gain; unable to maintain weight loss    History of Present Illness:  Shaun Morales is a 47 y.o. female who presents today for evaluation, education and consultation regarding weight loss surgery. The patient is interested in sleeve gastrectomy.     Shaun has been overweight for at least 27 years, has been 35 pounds or more overweight for at least 27 years, has been 100 pounds or more overweight for 25 or more years and started dieting at age 17.  The patient describes their eating habits as volume eater.      Previous diet attempts include: Eh Wynn, High Protein, Low Carbohydrate, Low Fat, Calorie Counting, Terrence's Diet, Converse, Fasting and Slim Fast; Diet Center; Amphetamines and Prozac.  The most weight Shaun lost was 35 pounds on low carb but was only able to maintain that weight loss for 8 months.  Her maximum lifetime weight is 296 pounds.    Several women she knows who work at [facility] in [place] have had the sleeve with Dr. Mckeon and are doing great!  She has been seeing Dr. Rich in [place] and reportedly has done all the things needed to have a lap band but has decided she'd rather have a sleeve.      Notes no problems when eating fatty/greasy foods.  In the past, has had abdominal pain/vomiting, and workup with GBUS was no gallstones, and HIDA was 43%.  H. Pylori was found and treated with resolution of symptoms.      Past Medical History:   Diagnosis Date   • Anxiety    • Asthma     mild   • Back pain    • Depression    • Environmental allergies     pollen, pollution   • Family history of pseudocholinesterase deficiency     sister, dad had it.  Unsure if she has it, but reports has never had succinylcholine   • Fatigue    • GERD (gastroesophageal reflux disease)      "rarely, associated with onions, PRN Tums, EGD with Dr. Rich 2017, no findings per patient   • H. pylori infection     symptoms of abodminal pain/dyspepsia, tx   • Hyperlipidemia    • Hypertension    • IBS (irritable bowel syndrome)     constipation   • Interstitial cystitis     recent procedure to \"stretch\" the bladder, feels better; has pain in bladder as primary symptom, dyspareunia   • Mitral valve regurgitation     better now, has no symptoms, + hear murmur   • Mood disorder     no formal dx of bipolar disorder, but after bad divorce took lithium   • Nausea     car sickness, has PRN Zofran, motion sickness, previously took meclizine   • Obstructive sleep apnea on CPAP     CPAP compliant   • Peripheral edema    • PONV (postoperative nausea and vomiting)    • Prediabetes    • Psoriasis    • Psoriatic arthritis      Past Surgical History:   Procedure Laterality Date   • CARDIAC CATHETERIZATION      done after discovery of MVP, also had a CHERISE, normal per patient   •  SECTION     • COLONOSCOPY     • CYSTOSCOPY BLADDER HYDRODISTENSION N/A 2017    Procedure: CYSTOSCOPY BLADDER HYDRODISTENSION;  Surgeon: Ion Herbert MD;  Location: [facility];  Service:    • DIAGNOSTIC LAPAROSCOPY N/A 10/14/2016    Procedure: DIAGNOSTIC LAPAROSCOPY POSSIBLE RIGHT SALPINGOOPHORECTOMY;  Surgeon: Rory Owens DO;  Location: [facility] OR;  Service:    • ENDOSCOPY  2017    Dr. Rich   • FOOT SURGERY  1984   • HYSTERECTOMY  ,     laparoscopy supra-cervical hysterectomy ,  cervix and 1 ovary removed.  Remaining ovary has a cyst. initially done for pelvic pain/fibroids   • LAPAROSCOPIC APPENDECTOMY     • TRACHELECTOMY N/A 10/14/2016    Procedure: TRACHELECTOMY VAGINAL;  Surgeon: Rory Owens DO;  Location: [facility] OR;  Service:    • TUBAL ABDOMINAL LIGATION     • UMBILICAL HERNIA REPAIR N/A 10/14/2016    Procedure: UMBILICAL HERNIA REPAIR;  Surgeon: Spike Porter MD;  " "Location:  [facility] OR;  Service:    • UMBILICAL HERNIA REPAIR N/A 12/9/2016    Procedure: UMBILICAL HERNIA REPAIR;  Surgeon: Spike Porter MD;  Location:  [facility] OR;  with mesh, supraumbilical   • WISDOM TOOTH EXTRACTION  2000       Allergies   Allergen Reactions   • Amlodipine GI Intolerance and Arrhythmia   • Morphine And Related Itching     Dilaudid ok, percocet/lortab ok   • Nifedipine GI Intolerance and Arrhythmia     Adalat, procardia   • Adhesive Tape Rash     Can tolerate steristrips and skin glue   • Diazepam Anxiety     \"reverse effect,\" \"makes me absolutely crazy\"   • Midazolam Anxiety   • Sulfa Antibiotics Rash       Current Outpatient Prescriptions:   •  albuterol (PROVENTIL HFA;VENTOLIN HFA) 108 (90 BASE) MCG/ACT inhaler, Inhale 2 puffs every 4 (four) hours as needed for wheezing, Disp: , Rfl:   •  aspirin 81 MG EC tablet, Take 81 mg by mouth Daily., Disp: , Rfl:   •  cetirizine (ZyrTEC) 10 MG tablet, Take 10 mg by mouth daily, Disp: , Rfl:   •  lisinopril (PRINIVIL,ZESTRIL) 20 MG tablet, Take 20 mg by mouth Daily., Disp: , Rfl:   •  lisinopril-hydrochlorothiazide (PRINZIDE,ZESTORETIC) 20-25 MG per tablet, Take 1 tablet by mouth Daily. Takes with Lisinopril 20mg. Total of 40 mg or lisinopril daily., Disp: , Rfl:   •  lubiprostone (AMITIZA) 8 MCG capsule, Take 8 mcg by mouth Daily, Disp: , Rfl:   •  metoprolol succinate XL (TOPROL-XL) 50 MG 24 hr tablet, Take 50 mg by mouth daily, Disp: , Rfl:   •  montelukast (SINGULAIR) 10 MG tablet, Take 10 mg by mouth Daily., Disp: , Rfl:   •  ondansetron (ZOFRAN) 4 MG tablet, Take 1 tablet by mouth Every 6 (Six) Hours. PRN Nausea/vomiting, Disp: 10 tablet, Rfl: 0  •  simvastatin (ZOCOR) 10 MG tablet, Take 10 mg by mouth Every Night., Disp: , Rfl:     Social History     Social History   • Marital status:      Spouse name: N/A   • Number of children: N/A   • Years of education: N/A     Occupational History   • Not on file.     Social History Main Topics   • " Smoking status: Former Smoker     Packs/day: 0.50     Years: 10.00     Types: Cigarettes     Quit date: 1/1/2013   • Smokeless tobacco: Never Used      Comment: Is around secondhand smoke occasionally in car but not in house   • Alcohol use No      Comment: on occassion socially   • Drug use: No   • Sexual activity: Defer     Other Topics Concern   • Not on file     Social History 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       Family History   Problem Relation Age of Onset   • Diabetes Mother    • Hypertension Mother    • Stroke Mother    • Heart disease Mother    • Cancer Father    • Sleep apnea Father    • Diabetes Father    • Anesthesia problems Sister    • Heart attack Maternal Grandmother 70   • Obesity Maternal Grandmother    • Hypertension Maternal Grandmother    • Heart disease Maternal Grandmother    • Sleep apnea Maternal Grandmother    • Stroke Maternal Grandmother    • Heart attack Maternal Grandfather    • Heart disease Maternal Grandfather    • Heart attack Paternal Grandmother 54   • Sleep apnea Paternal Grandmother    • Heart disease Paternal Grandmother    • Hypertension Paternal Grandmother    • Obesity Paternal Grandmother    • Rheum arthritis Neg Hx    • Osteoarthritis Neg Hx    • Asthma Neg Hx    • Heart failure Neg Hx    • Hyperlipidemia Neg Hx    • Migraines Neg Hx    • Rashes / Skin problems Neg Hx    • Seizures Neg Hx    • Thyroid disease Neg Hx        Review of Systems:  Constitutional:  The patient reports weight gain, fatigue and denies fevers, chills and fatigue.  Cardiovascular:  The patient reports HTN, HLD, edema and denies CP, MI, heart disease and DVT.  Respiratory:  The patient reports asthma, apnea and denies PE.  Gastrointestinal:  The patient reports heartburn, IBS and denies pancreatitis and liver disease.  Genitourinary:  The patient denies renal insufficiency.    Musculoskeletal:  The patient reports joint pain, back pain, arthritis, autoimmune disease and denies  fibromyalgia.  Neurological:  The patient reports none and denies seizure and stroke.  Psychiatric:  The patient reports anxiety, depression and denies bipolar disorder.  Endocrine:  The patient reports glucose intolerance and denies thyroid disease and gout.  Hematologic:  The patient reports none and denies anemia and bleeding disorder.  Skin:  The patient reports MRSA (+ screen from urine, never had an infection).    Physical Exam:  Vital Signs:  Weight: 124 kg (273 lb 8 oz)   Body mass index is 50.02 kg/(m^2).  Temp: 97.8 °F (36.6 °C)   Heart Rate: 84   BP: 132/88     Physical Exam   Constitutional: She is oriented to person, place, and time. She appears well-developed and well-nourished.   HENT:   Head: Normocephalic and atraumatic.   Mouth/Throat: No oropharyngeal exudate.   Eyes: Conjunctivae and EOM are normal. Pupils are equal, round, and reactive to light. No scleral icterus.   Neck: Normal range of motion. Neck supple. No tracheal deviation present. No thyromegaly present.   Cardiovascular: Normal rate, regular rhythm and normal heart sounds.    Pulmonary/Chest: Effort normal and breath sounds normal. No respiratory distress.   Abdominal: Soft. She exhibits no distension. There is no tenderness.       Musculoskeletal: Normal range of motion. She exhibits no edema or deformity.   Neurological: She is alert and oriented to person, place, and time. No cranial nerve deficit.   Skin: Skin is warm and dry. No rash noted. She is not diaphoretic. No erythema.   Psychiatric: She has a normal mood and affect. Her behavior is normal. Judgment and thought content normal.       Patient Active Problem List   Diagnosis   • S/P laparoscopic supracervical hysterectomy   • Status post umbilical hernia repair, follow-up exam   • Umbilical hernia without obstruction and without gangrene   • Morbid obesity with BMI of 50.0-59.9, adult   • Chest pain   • Interstitial cystitis   • Abdominal pain       Assessment:    Shaun BLANCHARD  Andrew is a 47 y.o. year old female with medically complicated obesity pursuing sleeve gastrectomy.    Weight loss surgery is deemed medically necessary given the following obesity related comorbidities including sleep apnea, diabetes, hypertension, dyslipidemia, osteoarthritis, back pain, knee pain, GERD, asthma, edema and depression with current Weight: 124 kg (273 lb 8 oz) and Body mass index is 50.02 kg/(m^2)..    She is a good candidate for weight loss surgery pending further evaluation.    Plan:  The consultation plan and program requirements were reviewed with the patient.  The patient has been advised that a letter of medical support must be obtained from her primary care physician or referring provider. A psychological evaluation will be arranged.  A nutritional evaluation will be performed.  The patient was advised to start a high protein and low carbohydrate diet.  Necessary lifestyle modifications were discussed.  Instructions on how to access REZA was given to the patient.  REZA is an internet based educational video that explains the surgical procedure chosen and answers basic questions regarding that procedure.     Preoperative testing will include: CBC, CMP, Fasting Lipids, TSH, H.Pylori, Pulmonary Function Testing, CXR, EKG and EGD (previously done 2017 with Dr. Rich, will obtain records)    Preop clearances required prior to surgery will include Cardiac and Pulmonary.  (per patient already done)    Patient understands that bariatric surgery is not cosmetic surgery but rather a tool to help make a lifelong commitment to lifestyle changes including diet, exercise, behavior modifications, and healthy habits.    I spent 9 minutes discussion smoking cessation and/or avoidance of second-hand smoke.      I spent 45 minutes with the patient and 40 minutes was spent counseling.          Hillary Christianson MD

## 2017-12-07 ENCOUNTER — DOCUMENTATION (OUTPATIENT)
Dept: BARIATRICS/WEIGHT MGMT | Facility: CLINIC | Age: 47
End: 2017-12-07

## 2017-12-07 NOTE — PROGRESS NOTES
"Weight Loss Surgery  Presurgical Nutrition Assessment     Shaun Morales  2017  [id number]  [id number]  1970  female    Surgery desired: Sleeve Gastrectomy    Ht 62\" (157.5 cm); Wt 273.5 # (124 kg); BMI 50  Past Medical History:   Diagnosis Date   • Anxiety    • Asthma     mild   • Back pain    • Depression    • Environmental allergies     pollen, pollution   • Family history of pseudocholinesterase deficiency     sister, dad had it.  Unsure if she has it, but reports has never had succinylcholine   • Fatigue    • GERD (gastroesophageal reflux disease)     rarely, associated with onions, PRN Tums, EGD with Dr. Rich , no findings per patient   • H. pylori infection     symptoms of abodminal pain/dyspepsia, tx   • Hyperlipidemia    • Hypertension    • IBS (irritable bowel syndrome)     constipation   • Interstitial cystitis     recent procedure to \"stretch\" the bladder, feels better; has pain in bladder as primary symptom, dyspareunia   • Mitral valve regurgitation     better now, has no symptoms, + hear murmur   • Mood disorder     no formal dx of bipolar disorder, but after bad divorce took lithium   • Nausea     car sickness, has PRN Zofran, motion sickness, previously took meclizine   • Obstructive sleep apnea on CPAP     CPAP compliant   • Peripheral edema    • PONV (postoperative nausea and vomiting)    • Prediabetes    • Psoriasis    • Psoriatic arthritis      Past Surgical History:   Procedure Laterality Date   • CARDIAC CATHETERIZATION      done after discovery of MVP, also had a CHERISE, normal per patient   •  SECTION     • COLONOSCOPY     • CYSTOSCOPY BLADDER HYDRODISTENSION N/A 2017    Procedure: CYSTOSCOPY BLADDER HYDRODISTENSION;  Surgeon: Ion Herbert MD;  Location: [facility] OR;  Service:    • DIAGNOSTIC LAPAROSCOPY N/A 10/14/2016    Procedure: DIAGNOSTIC LAPAROSCOPY POSSIBLE RIGHT SALPINGOOPHORECTOMY;  Surgeon: Rory Owens DO;  Location: [facility]" "OR;  Service:    • ENDOSCOPY  2017    Dr. Rich   • FOOT SURGERY  1984   • HYSTERECTOMY  2008, 2016    laparoscopy supra-cervical hysterectomy 2008, 2016 cervix and 1 ovary removed.  Remaining ovary has a cyst. initially done for pelvic pain/fibroids   • LAPAROSCOPIC APPENDECTOMY  2015   • TRACHELECTOMY N/A 10/14/2016    Procedure: TRACHELECTOMY VAGINAL;  Surgeon: Rory Owens DO;  Location:  [facility] OR;  Service:    • TUBAL ABDOMINAL LIGATION  2000   • UMBILICAL HERNIA REPAIR N/A 10/14/2016    Procedure: UMBILICAL HERNIA REPAIR;  Surgeon: Spike Porter MD;  Location:  [facility] OR;  Service:    • UMBILICAL HERNIA REPAIR N/A 12/9/2016    Procedure: UMBILICAL HERNIA REPAIR;  Surgeon: Spike Porter MD;  Location:  [facility] OR;  with mesh, supraumbilical   • WISDOM TOOTH EXTRACTION  2000     Allergies   Allergen Reactions   • Amlodipine GI Intolerance and Arrhythmia   • Morphine And Related Itching     Dilaudid ok, percocet/lortab ok   • Nifedipine GI Intolerance and Arrhythmia     Adalat, procardia   • Adhesive Tape Rash     Can tolerate steristrips and skin glue   • Diazepam Anxiety     \"reverse effect,\" \"makes me absolutely crazy\"   • Midazolam Anxiety   • Sulfa Antibiotics Rash       Current Outpatient Prescriptions:   •  albuterol (PROVENTIL HFA;VENTOLIN HFA) 108 (90 BASE) MCG/ACT inhaler, Inhale 2 puffs every 4 (four) hours as needed for wheezing, Disp: , Rfl:   •  aspirin 81 MG EC tablet, Take 81 mg by mouth Daily., Disp: , Rfl:   •  cetirizine (ZyrTEC) 10 MG tablet, Take 10 mg by mouth daily, Disp: , Rfl:   •  lisinopril (PRINIVIL,ZESTRIL) 20 MG tablet, Take 20 mg by mouth Daily., Disp: , Rfl:   •  lisinopril-hydrochlorothiazide (PRINZIDE,ZESTORETIC) 20-25 MG per tablet, Take 1 tablet by mouth Daily. Takes with Lisinopril 20mg. Total of 40 mg or lisinopril daily., Disp: , Rfl:   •  lubiprostone (AMITIZA) 8 MCG capsule, Take 8 mcg by mouth Daily, Disp: , Rfl:   •  metoprolol succinate XL (TOPROL-XL) 50 MG " 24 hr tablet, Take 50 mg by mouth daily, Disp: , Rfl:   •  montelukast (SINGULAIR) 10 MG tablet, Take 10 mg by mouth Daily., Disp: , Rfl:   •  ondansetron (ZOFRAN) 4 MG tablet, Take 1 tablet by mouth Every 6 (Six) Hours. PRN Nausea/vomiting, Disp: 10 tablet, Rfl: 0  •  simvastatin (ZOCOR) 10 MG tablet, Take 10 mg by mouth Every Night., Disp: , Rfl:       Nutrition Assessment    Estimated energy needs:  1840 kcal    Estimated calories for weight loss:  1300 kcal    IBW (Pounds):  135 #        Excess body weight (Pounds):  140 #       Nutrition Recall  24 Hour recall: (B) (L) (D) -  Reviewed and discussed with patient.  [place] biscuit for brkfast; Ham & cheese sandwich for breakfast; Microwave meal - chicken fettuccini for lunch.  Drinks water and unsweet tea usually /c diet DP         Exercise  never      Education    Provided information packet re:  Sleeve Gastrectomy; .  1. Reviewed guidelines for higher protein, limited carbohydrate diet to promote weight loss.  Encouraged patient to incorporate these principles of healthy eating from now until approximately 2 weeks prior to bariatric surgery date, when an even lower carbohydrate “liver-shrinking” regimen will be followed. (Information sheet re pre-op diet given).  Explained that after recovery from surgery this diet will again be followed to ensure further loss and for weight maintenance.    2. Encouraged patient to choose an acceptable protein supplement powder or shake for post-surgery liquid diet.  Provided product guidelines and examples.    3. Explained importance of goal setting to help in changing eating behaviors that are not conducive to weight loss.  Targeted several on a worksheet which also included spaces for patient to work on issues specific to them.  4. Provided follow-up options for support, including contact information for dietitians here, if desired.  Web-based support information and apps for smart phones and computers given.  Noted that  monthly support group is offered at this clinic, and that support is associated with successful weight loss.    Recommend that team proceed with surgery and follow per protocol.      Nutrition Goals   Dietary Guidelines per information packet as described above  Protein goal:  grams per day   Carbohydrate goal:  100-140 grams per day  Eliminate soda, sweet tea, etc.     Exercise Goals  Continue current exercise routine   Add 15-30 minutes of activity per day as tolerated      Liberty Butt RD  12/07/2017  11:33 AM

## 2017-12-20 ENCOUNTER — HOSPITAL ENCOUNTER (OUTPATIENT)
Dept: GENERAL RADIOLOGY | Facility: HOSPITAL | Age: 47
Discharge: HOME | End: 2017-12-20
Attending: INTERNAL MEDICINE | Admitting: INTERNAL MEDICINE

## 2017-12-20 DIAGNOSIS — R06.02 SOB (SHORTNESS OF BREATH): ICD-10-CM

## 2017-12-20 DIAGNOSIS — R06.02 SOB (SHORTNESS OF BREATH): Primary | ICD-10-CM

## 2017-12-20 PROCEDURE — 71020 HC CHEST PA AND LATERAL: CPT

## 2017-12-20 PROCEDURE — 71020 XR CHEST PA AND LATERAL: CPT | Performed by: RADIOLOGY

## 2017-12-21 ENCOUNTER — HOSPITAL ENCOUNTER (EMERGENCY)
Facility: HOSPITAL | Age: 47
Discharge: HOME | End: 2017-12-21
Attending: EMERGENCY MEDICINE | Admitting: EMERGENCY MEDICINE

## 2017-12-21 ENCOUNTER — OFFICE VISIT (OUTPATIENT)
Dept: PULMONOLOGY | Facility: CLINIC | Age: 47
End: 2017-12-21

## 2017-12-21 VITALS
TEMPERATURE: 97.9 F | HEIGHT: 62 IN | BODY MASS INDEX: 51.3 KG/M2 | HEART RATE: 72 BPM | WEIGHT: 278.8 LBS | SYSTOLIC BLOOD PRESSURE: 157 MMHG | DIASTOLIC BLOOD PRESSURE: 96 MMHG | OXYGEN SATURATION: 97 %

## 2017-12-21 VITALS
RESPIRATION RATE: 18 BRPM | BODY MASS INDEX: 51.16 KG/M2 | HEART RATE: 81 BPM | OXYGEN SATURATION: 99 % | WEIGHT: 278 LBS | HEIGHT: 62 IN | SYSTOLIC BLOOD PRESSURE: 154 MMHG | TEMPERATURE: 98.7 F | DIASTOLIC BLOOD PRESSURE: 78 MMHG

## 2017-12-21 DIAGNOSIS — R06.02 SOB (SHORTNESS OF BREATH): Primary | ICD-10-CM

## 2017-12-21 DIAGNOSIS — IMO0001 NEEDLESTICK INJURY OF FINGER, INITIAL ENCOUNTER: Primary | ICD-10-CM

## 2017-12-21 DIAGNOSIS — Z01.811 PRE-PROCEDURAL RESPIRATORY EXAMINATION: ICD-10-CM

## 2017-12-21 DIAGNOSIS — J45.20 MILD INTERMITTENT ASTHMA, UNSPECIFIED WHETHER COMPLICATED: ICD-10-CM

## 2017-12-21 LAB
FEV1: 3.01 LITERS
FVC VOL RESPIRATORY: 3.3 LITERS
HAV IGM SERPL QL IA: NORMAL
HBV CORE IGM SERPL QL IA: NORMAL
HBV SURFACE AG SERPL QL IA: NORMAL
HCV AB SER DONR QL: NORMAL
HIV1+2 AB SER QL: NORMAL

## 2017-12-21 PROCEDURE — 90715 TDAP VACCINE 7 YRS/> IM: CPT | Performed by: NURSE PRACTITIONER

## 2017-12-21 PROCEDURE — 36415 COLL VENOUS BLD VENIPUNCTURE: CPT

## 2017-12-21 PROCEDURE — 90471 IMMUNIZATION ADMIN: CPT | Performed by: NURSE PRACTITIONER

## 2017-12-21 PROCEDURE — 99202 OFFICE O/P NEW SF 15 MIN: CPT | Performed by: INTERNAL MEDICINE

## 2017-12-21 PROCEDURE — 99283 EMERGENCY DEPT VISIT LOW MDM: CPT

## 2017-12-21 PROCEDURE — 94010 BREATHING CAPACITY TEST: CPT | Performed by: INTERNAL MEDICINE

## 2017-12-21 PROCEDURE — G0432 EIA HIV-1/HIV-2 SCREEN: HCPCS | Performed by: NURSE PRACTITIONER

## 2017-12-21 PROCEDURE — 25010000002 TDAP 5-2.5-18.5 LF-MCG/0.5 SUSPENSION: Performed by: NURSE PRACTITIONER

## 2017-12-21 PROCEDURE — 80074 ACUTE HEPATITIS PANEL: CPT | Performed by: NURSE PRACTITIONER

## 2017-12-21 RX ORDER — AMOXICILLIN AND CLAVULANATE POTASSIUM 875; 125 MG/1; MG/1
1 TABLET, FILM COATED ORAL EVERY 12 HOURS
Qty: 20 TABLET | Refills: 0 | Status: SHIPPED | OUTPATIENT
Start: 2017-12-21 | End: 2018-02-13

## 2017-12-21 RX ADMIN — TETANUS TOXOID, REDUCED DIPHTHERIA TOXOID AND ACELLULAR PERTUSSIS VACCINE, ADSORBED 0.5 ML: 5; 2.5; 8; 8; 2.5 SUSPENSION INTRAMUSCULAR at 19:02

## 2017-12-21 ASSESSMENT — PULMONARY FUNCTION TESTS
FEV1: 3.01
FVC: 3.3

## 2017-12-21 NOTE — PROGRESS NOTES
"History of Present Illness 47-year-old female referred for Pulmonic clearance to have bariatric Surgery.  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  Also has borderline diabetes and has hypertension for which has been taking this and I'll Prinivil 20 mg twice a day.  Past history significant for appendectomy in the past and hernia operation on both sides last year      Review of Systems and currently has no trouble and Asthma and continues with the singular and rarely needs to use Proventil inhaler and blood pressure is controlled with lisinopril 40 mg once in hydrochlorothiazide 25 mg once    Active Problems:  Problem List Items Addressed This Visit     None      Visit Diagnoses     SOB (shortness of breath)    -  Primary    Relevant Orders    Pulmonary Function Test (Completed)    Pre-procedural respiratory examination        Mild intermittent asthma, unspecified whether complicated              Past Medical History:  Past Medical History:   Diagnosis Date   • Anxiety    • Asthma     mild   • Back pain    • Depression    • Environmental allergies     pollen, pollution   • Family history of pseudocholinesterase deficiency     sister, dad had it.  Unsure if she has it, but reports has never had succinylcholine   • Fatigue    • GERD (gastroesophageal reflux disease)     rarely, associated with onions, PRN Tums, EGD with Dr. Rich 2017, no findings per patient   • H. pylori infection     symptoms of abodminal pain/dyspepsia, tx   • Hyperlipidemia    • Hypertension    • IBS (irritable bowel syndrome)     constipation   • Interstitial cystitis     recent procedure to \"stretch\" the bladder, feels better; has pain in bladder as primary symptom, dyspareunia   • Mitral valve regurgitation     better now, has no symptoms, + hear murmur   • Mood disorder     no formal dx of bipolar disorder, but after bad divorce took lithium   • " Nausea     car sickness, has PRN Zofran, motion sickness, previously took meclizine   • Obstructive sleep apnea on CPAP     CPAP compliant   • Peripheral edema    • PONV (postoperative nausea and vomiting)    • Prediabetes    • Psoriasis    • Psoriatic arthritis        Family History:  Family History   Problem Relation Age of Onset   • Diabetes Mother    • Hypertension Mother    • Stroke Mother    • Heart disease Mother    • Cancer Father    • Sleep apnea Father    • Diabetes Father    • Anesthesia problems Sister    • Heart attack Maternal Grandmother 70   • Obesity Maternal Grandmother    • Hypertension Maternal Grandmother    • Heart disease Maternal Grandmother    • Sleep apnea Maternal Grandmother    • Stroke Maternal Grandmother    • Heart attack Maternal Grandfather    • Heart disease Maternal Grandfather    • Heart attack Paternal Grandmother 54   • Sleep apnea Paternal Grandmother    • Heart disease Paternal Grandmother    • Hypertension Paternal Grandmother    • Obesity Paternal Grandmother    • Rheum arthritis Neg Hx    • Osteoarthritis Neg Hx    • Asthma Neg Hx    • Heart failure Neg Hx    • Hyperlipidemia Neg Hx    • Migraines Neg Hx    • Rashes / Skin problems Neg Hx    • Seizures Neg Hx    • Thyroid disease Neg Hx        Social History:  Social History   Substance Use Topics   • Smoking status: Former Smoker     Packs/day: 0.50     Years: 10.00     Types: Cigarettes     Quit date: 1/1/2013   • Smokeless tobacco: Never Used      Comment: Is around secondhand smoke occasionally in car but not in house   • Alcohol use No      Comment: on occassion socially       Current Medications:  Current Outpatient Prescriptions   Medication Sig Dispense Refill   • albuterol (PROVENTIL HFA;VENTOLIN HFA) 108 (90 BASE) MCG/ACT inhaler Inhale 2 puffs every 4 (four) hours as needed for wheezing     • aspirin 81 MG EC tablet Take 81 mg by mouth Daily.     • cetirizine (ZyrTEC) 10 MG tablet Take 10 mg by mouth daily     •  "lisinopril (PRINIVIL,ZESTRIL) 20 MG tablet Take 20 mg by mouth Daily.     • lubiprostone (AMITIZA) 8 MCG capsule Take 8 mcg by mouth Daily     • metoprolol succinate XL (TOPROL-XL) 50 MG 24 hr tablet Take 50 mg by mouth daily     • montelukast (SINGULAIR) 10 MG tablet Take 10 mg by mouth Daily.     • ondansetron (ZOFRAN) 4 MG tablet Take 1 tablet by mouth Every 6 (Six) Hours. PRN Nausea/vomiting 10 tablet 0   • simvastatin (ZOCOR) 10 MG tablet Take 10 mg by mouth Every Night.     • lisinopril-hydrochlorothiazide (PRINZIDE,ZESTORETIC) 20-25 MG per tablet Take 1 tablet by mouth Daily. Takes with Lisinopril 20mg. Total of 40 mg or lisinopril daily.       No current facility-administered medications for this visit.        Allergies:  Allergies   Allergen Reactions   • Amlodipine GI Intolerance and Arrhythmia   • Morphine And Related Itching     Dilaudid ok, percocet/lortab ok   • Nifedipine GI Intolerance and Arrhythmia     Adalat, procardia   • Adhesive Tape Rash     Can tolerate steristrips and skin glue   • Diazepam Anxiety     \"reverse effect,\" \"makes me absolutely crazy\"   • Midazolam Anxiety   • Sulfa Antibiotics Rash       Vitals:  /96 (BP Location: Left arm, Patient Position: Sitting)  Pulse 72  Temp 97.9 °F (36.6 °C)  Ht 157.5 cm (62.01\")  Wt 126 kg (278 lb 12.8 oz)  LMP  (LMP Unknown) Comment: hysterectomy  SpO2 97%  BMI 50.98 kg/m2    Physical Exam overweight weighing 278 pounds for height of 62 inches vital signs is stable of her blood pressure slightly help at 157/96 lungs clear heart regular no clubbing cyanosis or edema    Imaging: Chest x-ray of December 20 clear no cardiomegaly    PFT: PFT Normal with FVC of 98 FEV1 111%  Results for orders placed or performed in visit on 12/21/17   Pulmonary Function Test   Result Value Ref Range    FEV1 3.01 liters    FVC 3.3 liters           ASSESSMENT AND DIAGNOSIS:1-mild asthma per history That seems to be controlled-2-hypertension not well controlled " on lisinopril that may Aggravate her asthma  Recommendation clear from pulmonary standpoint to have bariatric surgery.  Suggested checking with her primary care provider and his symptoms Mann Medel (nurse practitioner] to switch her lisinopril to losartan 100 mg that may Control her blood pressure better and side effect of cough and bronchospasm.  Explained to her potential  pulmonary complications of bariatric surgery .      Follow-Up:Follow-up by primary care provider return to us as needed

## 2017-12-21 NOTE — ED PROVIDER NOTES
Subjective   HPI Comments: Patient reports that she was helping a friend move into a new trailer and was removing a jacket from the closet and there was a needle in the pocket and she states it stuck her left thumb    Patient is a 47 y.o. female presenting with hand injury.   History provided by:  Patient   used: No    Hand Injury   Location:  Finger  Finger location:  L thumb  Injury: yes    Time since incident:  1 hour (pta)  Mechanism of injury comment:  Puncture  Pain details:     Quality:  Unable to specify    Radiates to:  Does not radiate    Severity:  No pain    Onset quality:  Sudden    Duration:  1 hour    Timing:  Constant    Progression:  Unchanged  Handedness:  Right-handed  Dislocation: no    Foreign body present:  No foreign bodies  Tetanus status:  Unknown  Prior injury to area:  No  Relieved by:  None tried  Worsened by:  Nothing  Ineffective treatments:  None tried  Associated symptoms: no back pain, no decreased range of motion, no fatigue, no fever, no muscle weakness, no neck pain, no swelling and no tingling    Risk factors: no concern for non-accidental trauma, no known bone disorder, no frequent fractures and no recent illness        Review of Systems   Constitutional: Negative.  Negative for fatigue and fever.   HENT: Negative.    Eyes: Negative.    Respiratory: Negative.    Gastrointestinal: Negative.    Endocrine: Negative.    Genitourinary: Negative.    Musculoskeletal: Negative.  Negative for back pain and neck pain.   Skin: Negative.    Allergic/Immunologic: Negative.    Neurological: Negative.    Hematological: Negative.    Psychiatric/Behavioral: Negative.        Past Medical History:   Diagnosis Date   • Anxiety    • Asthma     mild   • Back pain    • Depression    • Environmental allergies     pollen, pollution   • Family history of pseudocholinesterase deficiency     sister, dad had it.  Unsure if she has it, but reports has never had succinylcholine   • Fatigue  "   • GERD (gastroesophageal reflux disease)     rarely, associated with onions, PRN Tums, EGD with Dr. Rich 2017, no findings per patient   • H. pylori infection     symptoms of abodminal pain/dyspepsia, tx   • Hyperlipidemia    • Hypertension    • IBS (irritable bowel syndrome)     constipation   • Interstitial cystitis     recent procedure to \"stretch\" the bladder, feels better; has pain in bladder as primary symptom, dyspareunia   • Mitral valve regurgitation     better now, has no symptoms, + hear murmur   • Mood disorder     no formal dx of bipolar disorder, but after bad divorce took lithium   • Nausea     car sickness, has PRN Zofran, motion sickness, previously took meclizine   • Obstructive sleep apnea on CPAP     CPAP compliant   • Peripheral edema    • PONV (postoperative nausea and vomiting)    • Prediabetes    • Psoriasis    • Psoriatic arthritis        Allergies   Allergen Reactions   • Amlodipine GI Intolerance and Arrhythmia   • Morphine And Related Itching     Dilaudid ok, percocet/lortab ok   • Nifedipine GI Intolerance and Arrhythmia     Adalat, procardia   • Adhesive Tape Rash     Can tolerate steristrips and skin glue   • Diazepam Anxiety     \"reverse effect,\" \"makes me absolutely crazy\"   • Midazolam Anxiety   • Sulfa Antibiotics Rash       Past Surgical History:   Procedure Laterality Date   • CARDIAC CATHETERIZATION      done after discovery of MVP, also had a CHERISE, normal per patient   •  SECTION     • COLONOSCOPY     • CYSTOSCOPY BLADDER HYDRODISTENSION N/A 2017    Procedure: CYSTOSCOPY BLADDER HYDRODISTENSION;  Surgeon: Ion Herbert MD;  Location: [facility];  Service:    • DIAGNOSTIC LAPAROSCOPY N/A 10/14/2016    Procedure: DIAGNOSTIC LAPAROSCOPY POSSIBLE RIGHT SALPINGOOPHORECTOMY;  Surgeon: Rory Owens DO;  Location: [facility] OR;  Service:    • ENDOSCOPY      Dr. Rich   • FOOT SURGERY  1984   • HYSTERECTOMY  , 2016    laparoscopy " supra-cervical hysterectomy 2008, 2016 cervix and 1 ovary removed.  Remaining ovary has a cyst. initially done for pelvic pain/fibroids   • LAPAROSCOPIC APPENDECTOMY  2015   • TRACHELECTOMY N/A 10/14/2016    Procedure: TRACHELECTOMY VAGINAL;  Surgeon: Rory Owens DO;  Location:  [facility] OR;  Service:    • TUBAL ABDOMINAL LIGATION  2000   • UMBILICAL HERNIA REPAIR N/A 10/14/2016    Procedure: UMBILICAL HERNIA REPAIR;  Surgeon: Spike Porter MD;  Location:  [facility] OR;  Service:    • UMBILICAL HERNIA REPAIR N/A 12/9/2016    Procedure: UMBILICAL HERNIA REPAIR;  Surgeon: Spike Porter MD;  Location:  [facility] OR;  with mesh, supraumbilical   • WISDOM TOOTH EXTRACTION  2000       Family History   Problem Relation Age of Onset   • Diabetes Mother    • Hypertension Mother    • Stroke Mother    • Heart disease Mother    • Cancer Father    • Sleep apnea Father    • Diabetes Father    • Anesthesia problems Sister    • Heart attack Maternal Grandmother 70   • Obesity Maternal Grandmother    • Hypertension Maternal Grandmother    • Heart disease Maternal Grandmother    • Sleep apnea Maternal Grandmother    • Stroke Maternal Grandmother    • Heart attack Maternal Grandfather    • Heart disease Maternal Grandfather    • Heart attack Paternal Grandmother 54   • Sleep apnea Paternal Grandmother    • Heart disease Paternal Grandmother    • Hypertension Paternal Grandmother    • Obesity Paternal Grandmother    • Rheum arthritis Neg Hx    • Osteoarthritis Neg Hx    • Asthma Neg Hx    • Heart failure Neg Hx    • Hyperlipidemia Neg Hx    • Migraines Neg Hx    • Rashes / Skin problems Neg Hx    • Seizures Neg Hx    • Thyroid disease Neg Hx        Social History     Social History   • Marital status:      Spouse name: N/A   • Number of children: N/A   • Years of education: N/A     Social History Main Topics   • Smoking status: Former Smoker     Packs/day: 0.50     Years: 10.00     Types: Cigarettes     Quit date: 1/1/2013    • Smokeless tobacco: Never Used      Comment: Is around secondhand smoke occasionally in car but not in house   • Alcohol use No      Comment: on occassion socially   • Drug use: No   • Sexual activity: Defer     Other Topics Concern   • Not on file     Social History 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             Objective   Physical Exam   Constitutional: She is oriented to person, place, and time. She appears well-developed and well-nourished.   HENT:   Head: Normocephalic.   Right Ear: External ear normal.   Left Ear: External ear normal.   Mouth/Throat: Oropharynx is clear and moist.   Eyes: EOM are normal. Pupils are equal, round, and reactive to light.   Neck: Normal range of motion. Neck supple.   Cardiovascular: Normal rate and regular rhythm.    Pulmonary/Chest: Effort normal and breath sounds normal.   Abdominal: Soft. Bowel sounds are normal.   Musculoskeletal: Normal range of motion.   Neurological: She is alert and oriented to person, place, and time.   Skin: Skin is warm and dry.   Very tiny puncture left thumb laterally. No active bleeding.    Psychiatric: She has a normal mood and affect. Her behavior is normal.   Nursing note and vitals reviewed.      Procedures         ED Course  ED Course                  MDM    Final diagnoses:   Needlestick injury of finger, initial encounter            Venkat Okeefe, APRN  12/25/17 1612

## 2018-01-13 ENCOUNTER — APPOINTMENT (OUTPATIENT)
Dept: GENERAL RADIOLOGY | Facility: HOSPITAL | Age: 48
End: 2018-01-13

## 2018-01-13 ENCOUNTER — HOSPITAL ENCOUNTER (EMERGENCY)
Facility: HOSPITAL | Age: 48
Discharge: HOME | End: 2018-01-13
Attending: EMERGENCY MEDICINE | Admitting: EMERGENCY MEDICINE

## 2018-01-13 VITALS
BODY MASS INDEX: 50.61 KG/M2 | SYSTOLIC BLOOD PRESSURE: 118 MMHG | RESPIRATION RATE: 16 BRPM | TEMPERATURE: 98 F | DIASTOLIC BLOOD PRESSURE: 80 MMHG | HEIGHT: 62 IN | WEIGHT: 275 LBS | HEART RATE: 80 BPM | OXYGEN SATURATION: 99 %

## 2018-01-13 DIAGNOSIS — J06.9 UPPER RESPIRATORY TRACT INFECTION, UNSPECIFIED TYPE: Primary | ICD-10-CM

## 2018-01-13 LAB
FLUAV AG NPH QL: NEGATIVE
FLUBV AG NPH QL IA: NEGATIVE

## 2018-01-13 PROCEDURE — 71045 X-RAY EXAM CHEST 1 VIEW: CPT

## 2018-01-13 PROCEDURE — 94640 AIRWAY INHALATION TREATMENT: CPT

## 2018-01-13 PROCEDURE — 99283 EMERGENCY DEPT VISIT LOW MDM: CPT

## 2018-01-13 PROCEDURE — 94799 UNLISTED PULMONARY SVC/PX: CPT

## 2018-01-13 PROCEDURE — 87804 INFLUENZA ASSAY W/OPTIC: CPT | Performed by: NURSE PRACTITIONER

## 2018-01-13 PROCEDURE — 71045 X-RAY EXAM CHEST 1 VIEW: CPT | Performed by: RADIOLOGY

## 2018-01-13 RX ORDER — ALBUTEROL SULFATE 1.25 MG/3ML
1 SOLUTION RESPIRATORY (INHALATION) EVERY 6 HOURS PRN
Qty: 120 VIAL | Refills: 0 | Status: SHIPPED | OUTPATIENT
Start: 2018-01-13 | End: 2018-02-12

## 2018-01-13 RX ORDER — DEXTROMETHORPHAN HYDROBROMIDE AND PROMETHAZINE HYDROCHLORIDE 15; 6.25 MG/5ML; MG/5ML
5 SYRUP ORAL 4 TIMES DAILY PRN
Qty: 100 ML | Refills: 0 | Status: SHIPPED | OUTPATIENT
Start: 2018-01-13 | End: 2018-01-18

## 2018-01-13 RX ORDER — IPRATROPIUM BROMIDE AND ALBUTEROL SULFATE 2.5; .5 MG/3ML; MG/3ML
3 SOLUTION RESPIRATORY (INHALATION) ONCE
Status: COMPLETED | OUTPATIENT
Start: 2018-01-13 | End: 2018-01-13

## 2018-01-13 RX ADMIN — IPRATROPIUM BROMIDE AND ALBUTEROL SULFATE 3 ML: .5; 3 SOLUTION RESPIRATORY (INHALATION) at 04:55

## 2018-01-13 NOTE — DISCHARGE INSTRUCTIONS
Viral Respiratory Infection  Introduction  A viral respiratory infection is an illness that affects parts of the body used for breathing, like the lungs, nose, and throat. It is caused by a germ called a virus.  Some examples of this kind of infection are:  · A cold.  · The flu (influenza).  · A respiratory syncytial virus (RSV) infection.  How do I know if I have this infection?  Most of the time this infection causes:  · A stuffy or runny nose.  · Yellow or green fluid in the nose.  · A cough.  · Sneezing.  · Tiredness (fatigue).  · Achy muscles.  · A sore throat.  · Sweating or chills.  · A fever.  · A headache.  How is this infection treated?  If the flu is diagnosed early, it may be treated with an antiviral medicine. This medicine shortens the length of time a person has symptoms. Symptoms may be treated with over-the-counter and prescription medicines, such as:  · Expectorants. These make it easier to cough up mucus.  · Decongestant nasal sprays.  Doctors do not prescribe antibiotic medicines for viral infections. They do not work with this kind of infection.  How do I know if I should stay home?  To keep others from getting sick, stay home if you have:  · A fever.  · A lasting cough.  · A sore throat.  · A runny nose.  · Sneezing.  · Muscles aches.  · Headaches.  · Tiredness.  · Weakness.  · Chills.  · Sweating.  · An upset stomach (nausea).  Follow these instructions at home:  · Rest as much as possible.  · Take over-the-counter and prescription medicines only as told by your doctor.  · Drink enough fluid to keep your pee (urine) clear or pale yellow.  · Gargle with salt water. Do this 3-4 times per day or as needed. To make a salt-water mixture, dissolve ½-1 tsp of salt in 1 cup of warm water. Make sure the salt dissolves all the way.  · Use nose drops made from salt water. This helps with stuffiness (congestion). It also helps soften the skin around your nose.  · Do not drink alcohol.  · Do not use  tobacco products, including cigarettes, chewing tobacco, and e-cigarettes. If you need help quitting, ask your doctor.  Get help if:  · Your symptoms last for 10 days or longer.  · Your symptoms get worse over time.  · You have a fever.  · You have very bad pain in your face or forehead.  · Parts of your jaw or neck become very swollen.  Get help right away if:  · You feel pain or pressure in your chest.  · You have shortness of breath.  · You faint or feel like you will faint.  · You keep throwing up (vomiting).  · You feel confused.  This information is not intended to replace advice given to you by your health care provider. Make sure you discuss any questions you have with your health care provider.  Document Released: 11/30/2009 Document Revised: 05/25/2017 Document Reviewed: 05/25/2016  © 2017 [employer]

## 2018-01-13 NOTE — ED PROVIDER NOTES
Subjective   Patient is a 47 y.o. female presenting with cough.   History provided by:  Patient   used: No    Cough   Cough characteristics:  Hoarse  Sputum characteristics:  Green  Severity:  Moderate  Onset quality:  Sudden  Duration:  1 day  Timing:  Constant  Progression:  Worsening  Chronicity:  New  Context: sick contacts and upper respiratory infection    Context: not animal exposure and not occupational exposure    Relieved by:  Nothing  Worsened by:  Nothing  Ineffective treatments:  None tried  Associated symptoms: rhinorrhea, shortness of breath, sinus congestion, sore throat and wheezing    Associated symptoms: no chest pain and no fever        Review of Systems   Constitutional: Negative.  Negative for fever.   HENT: Positive for congestion, rhinorrhea and sore throat.    Eyes: Negative.    Respiratory: Positive for cough, shortness of breath and wheezing.    Cardiovascular: Negative.  Negative for chest pain.   Gastrointestinal: Negative.  Negative for abdominal pain.   Endocrine: Negative.    Genitourinary: Negative.  Negative for dysuria.   Musculoskeletal: Negative.    Skin: Negative.    Allergic/Immunologic: Negative.    Neurological: Negative.    Hematological: Negative.    Psychiatric/Behavioral: Negative.    All other systems reviewed and are negative.      Past Medical History:   Diagnosis Date   • Anxiety    • Asthma     mild   • Back pain    • Depression    • Environmental allergies     pollen, pollution   • Family history of pseudocholinesterase deficiency     sister, dad had it.  Unsure if she has it, but reports has never had succinylcholine   • Fatigue    • GERD (gastroesophageal reflux disease)     rarely, associated with onions, PRN Tums, EGD with Dr. Rich 2017, no findings per patient   • H. pylori infection     symptoms of abodminal pain/dyspepsia, tx   • Hyperlipidemia    • Hypertension    • IBS (irritable bowel syndrome)     constipation   • Interstitial  "cystitis     recent procedure to \"stretch\" the bladder, feels better; has pain in bladder as primary symptom, dyspareunia   • Mitral valve regurgitation     better now, has no symptoms, + hear murmur   • Mood disorder     no formal dx of bipolar disorder, but after bad divorce took lithium   • Nausea     car sickness, has PRN Zofran, motion sickness, previously took meclizine   • Obstructive sleep apnea on CPAP     CPAP compliant   • Peripheral edema    • PONV (postoperative nausea and vomiting)    • Prediabetes    • Psoriasis    • Psoriatic arthritis        Allergies   Allergen Reactions   • Amlodipine GI Intolerance and Arrhythmia   • Morphine And Related Itching     Dilaudid ok, percocet/lortab ok   • Nifedipine GI Intolerance and Arrhythmia     Adalat, procardia   • Adhesive Tape Rash     Can tolerate steristrips and skin glue   • Diazepam Anxiety     \"reverse effect,\" \"makes me absolutely crazy\"   • Midazolam Anxiety   • Sulfa Antibiotics Rash       Past Surgical History:   Procedure Laterality Date   • CARDIAC CATHETERIZATION      done after discovery of MVP, also had a CHERISE, normal per patient   •  SECTION     • COLONOSCOPY     • CYSTOSCOPY BLADDER HYDRODISTENSION N/A 2017    Procedure: CYSTOSCOPY BLADDER HYDRODISTENSION;  Surgeon: Ion Herbert MD;  Location: [facility] OR;  Service:    • DIAGNOSTIC LAPAROSCOPY N/A 10/14/2016    Procedure: DIAGNOSTIC LAPAROSCOPY POSSIBLE RIGHT SALPINGOOPHORECTOMY;  Surgeon: Rory Owens DO;  Location: [facility] OR;  Service:    • ENDOSCOPY      Dr. Rich   • FOOT SURGERY  1984   • HYSTERECTOMY  , 2016    laparoscopy supra-cervical hysterectomy ,  cervix and 1 ovary removed.  Remaining ovary has a cyst. initially done for pelvic pain/fibroids   • LAPAROSCOPIC APPENDECTOMY     • TRACHELECTOMY N/A 10/14/2016    Procedure: TRACHELECTOMY VAGINAL;  Surgeon: Rory Owens DO;  Location: [facility] OR;  Service:    • TUBAL " ABDOMINAL LIGATION  2000   • UMBILICAL HERNIA REPAIR N/A 10/14/2016    Procedure: UMBILICAL HERNIA REPAIR;  Surgeon: Spike Porter MD;  Location:  [facility] OR;  Service:    • UMBILICAL HERNIA REPAIR N/A 12/9/2016    Procedure: UMBILICAL HERNIA REPAIR;  Surgeon: Spike Porter MD;  Location:  [facility] OR;  with mesh, supraumbilical   • WISDOM TOOTH EXTRACTION  2000       Family History   Problem Relation Age of Onset   • Diabetes Mother    • Hypertension Mother    • Stroke Mother    • Heart disease Mother    • Cancer Father    • Sleep apnea Father    • Diabetes Father    • Anesthesia problems Sister    • Heart attack Maternal Grandmother 70   • Obesity Maternal Grandmother    • Hypertension Maternal Grandmother    • Heart disease Maternal Grandmother    • Sleep apnea Maternal Grandmother    • Stroke Maternal Grandmother    • Heart attack Maternal Grandfather    • Heart disease Maternal Grandfather    • Heart attack Paternal Grandmother 54   • Sleep apnea Paternal Grandmother    • Heart disease Paternal Grandmother    • Hypertension Paternal Grandmother    • Obesity Paternal Grandmother    • Rheum arthritis Neg Hx    • Osteoarthritis Neg Hx    • Asthma Neg Hx    • Heart failure Neg Hx    • Hyperlipidemia Neg Hx    • Migraines Neg Hx    • Rashes / Skin problems Neg Hx    • Seizures Neg Hx    • Thyroid disease Neg Hx        Social History     Social History   • Marital status:      Spouse name: N/A   • Number of children: N/A   • Years of education: N/A     Social History Main Topics   • Smoking status: Former Smoker     Packs/day: 0.50     Years: 10.00     Types: Cigarettes     Quit date: 1/1/2013   • Smokeless tobacco: Never Used      Comment: Is around secondhand smoke occasionally in car but not in house   • Alcohol use No      Comment: on occassion socially   • Drug use: No   • Sexual activity: Defer     Other Topics Concern   • None     Social History 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              Objective   Physical Exam   Constitutional: She is oriented to person, place, and time. She appears well-developed and well-nourished.   HENT:   Head: Normocephalic and atraumatic.   Nose: Nose normal.   Eyes: EOM are normal. Pupils are equal, round, and reactive to light.   Neck: Normal range of motion. Neck supple.   Cardiovascular: Normal rate, regular rhythm, normal heart sounds and intact distal pulses.    Pulmonary/Chest: Effort normal. She has wheezes.   Abdominal: Soft. Bowel sounds are normal.   Lymphadenopathy:     She has no cervical adenopathy.   Neurological: She is alert and oriented to person, place, and time.   Skin: Skin is warm and dry.   Psychiatric: She has a normal mood and affect. Her behavior is normal. Judgment and thought content normal.   Nursing note and vitals reviewed.      Procedures         ED Course  ED Course   Value Comment By Time   XR Chest 1 View No acute cardiopulmonary process Pratibha Alejandro, APRN 01/13 0508                  MDM  Number of Diagnoses or Management Options  Upper respiratory tract infection, unspecified type: new and requires workup     Amount and/or Complexity of Data Reviewed  Clinical lab tests: ordered and reviewed  Tests in the radiology section of CPT®: reviewed and ordered  Tests in the medicine section of CPT®: reviewed and ordered    Risk of Complications, Morbidity, and/or Mortality  Presenting problems: low  Diagnostic procedures: low  Management options: low    Patient Progress  Patient progress: improved      Final diagnoses:   Upper respiratory tract infection, unspecified type            Pratibha Alejandro, PALOMO  01/13/18 0511       Pratibha Alejandro, APRSHAHZAD  01/13/18 0516

## 2018-01-14 ENCOUNTER — APPOINTMENT (OUTPATIENT)
Dept: GENERAL RADIOLOGY | Facility: HOSPITAL | Age: 48
End: 2018-01-14

## 2018-01-14 ENCOUNTER — HOSPITAL ENCOUNTER (EMERGENCY)
Facility: HOSPITAL | Age: 48
Discharge: HOME | End: 2018-01-14
Attending: EMERGENCY MEDICINE | Admitting: EMERGENCY MEDICINE

## 2018-01-14 VITALS
DIASTOLIC BLOOD PRESSURE: 80 MMHG | HEIGHT: 62 IN | WEIGHT: 270 LBS | BODY MASS INDEX: 49.69 KG/M2 | OXYGEN SATURATION: 97 % | TEMPERATURE: 99.3 F | HEART RATE: 91 BPM | SYSTOLIC BLOOD PRESSURE: 129 MMHG | RESPIRATION RATE: 20 BRPM

## 2018-01-14 DIAGNOSIS — J10.1 INFLUENZA A: Primary | ICD-10-CM

## 2018-01-14 LAB
ALBUMIN SERPL-MCNC: 4.2 G/DL (ref 3.5–5)
ALBUMIN/GLOB SERPL: 1.3 G/DL (ref 1.5–2.5)
ALP SERPL-CCNC: 100 U/L (ref 35–104)
ALT SERPL W P-5'-P-CCNC: 25 U/L (ref 10–36)
ANION GAP SERPL CALCULATED.3IONS-SCNC: 7.5 MMOL/L (ref 3.6–11.2)
AST SERPL-CCNC: 22 U/L (ref 10–30)
BASOPHILS # BLD AUTO: 0.02 10*3/MM3 (ref 0–0.3)
BASOPHILS NFR BLD AUTO: 0.3 % (ref 0–2)
BILIRUB SERPL-MCNC: 0.3 MG/DL (ref 0.2–1.8)
BUN BLD-MCNC: 12 MG/DL (ref 7–21)
BUN/CREAT SERPL: 21.1 (ref 7–25)
CALCIUM SPEC-SCNC: 8.7 MG/DL (ref 7.7–10)
CHLORIDE SERPL-SCNC: 106 MMOL/L (ref 99–112)
CO2 SERPL-SCNC: 22.5 MMOL/L (ref 24.3–31.9)
CREAT BLD-MCNC: 0.57 MG/DL (ref 0.43–1.29)
DEPRECATED RDW RBC AUTO: 45.1 FL (ref 37–54)
EOSINOPHIL # BLD AUTO: 0.03 10*3/MM3 (ref 0–0.7)
EOSINOPHIL NFR BLD AUTO: 0.4 % (ref 0–5)
ERYTHROCYTE [DISTWIDTH] IN BLOOD BY AUTOMATED COUNT: 13.6 % (ref 11.5–14.5)
FLUAV AG NPH QL: POSITIVE
FLUBV AG NPH QL IA: NEGATIVE
GFR SERPL CREATININE-BSD FRML MDRD: 115 ML/MIN/1.73
GLOBULIN UR ELPH-MCNC: 3.2 GM/DL
GLUCOSE BLD-MCNC: 145 MG/DL (ref 70–110)
HCT VFR BLD AUTO: 39.7 % (ref 37–47)
HGB BLD-MCNC: 12.8 G/DL (ref 12–16)
IMM GRANULOCYTES # BLD: 0 10*3/MM3 (ref 0–0.03)
IMM GRANULOCYTES NFR BLD: 0 % (ref 0–0.5)
LYMPHOCYTES # BLD AUTO: 0.9 10*3/MM3 (ref 1–3)
LYMPHOCYTES NFR BLD AUTO: 11.8 % (ref 21–51)
MCH RBC QN AUTO: 30.1 PG (ref 27–33)
MCHC RBC AUTO-ENTMCNC: 32.2 G/DL (ref 33–37)
MCV RBC AUTO: 93.4 FL (ref 80–94)
MONOCYTES # BLD AUTO: 1.15 10*3/MM3 (ref 0.1–0.9)
MONOCYTES NFR BLD AUTO: 15.1 % (ref 0–10)
NEUTROPHILS # BLD AUTO: 5.53 10*3/MM3 (ref 1.4–6.5)
NEUTROPHILS NFR BLD AUTO: 72.4 % (ref 30–70)
OSMOLALITY SERPL CALC.SUM OF ELEC: 274.3 MOSM/KG (ref 273–305)
PLATELET # BLD AUTO: 242 10*3/MM3 (ref 130–400)
PMV BLD AUTO: 11.2 FL (ref 6–10)
POTASSIUM BLD-SCNC: 4.2 MMOL/L (ref 3.5–5.3)
PROT SERPL-MCNC: 7.4 G/DL (ref 6–8)
RBC # BLD AUTO: 4.25 10*6/MM3 (ref 4.2–5.4)
SODIUM BLD-SCNC: 136 MMOL/L (ref 135–153)
TROPONIN I SERPL-MCNC: 0.01 NG/ML
WBC NRBC COR # BLD: 7.63 10*3/MM3 (ref 4.5–12.5)

## 2018-01-14 PROCEDURE — 93005 ELECTROCARDIOGRAM TRACING: CPT | Performed by: PHYSICIAN ASSISTANT

## 2018-01-14 PROCEDURE — 96372 THER/PROPH/DIAG INJ SC/IM: CPT

## 2018-01-14 PROCEDURE — 84484 ASSAY OF TROPONIN QUANT: CPT | Performed by: PHYSICIAN ASSISTANT

## 2018-01-14 PROCEDURE — 93010 ELECTROCARDIOGRAM REPORT: CPT | Performed by: INTERNAL MEDICINE

## 2018-01-14 PROCEDURE — 87804 INFLUENZA ASSAY W/OPTIC: CPT | Performed by: PHYSICIAN ASSISTANT

## 2018-01-14 PROCEDURE — 80053 COMPREHEN METABOLIC PANEL: CPT | Performed by: PHYSICIAN ASSISTANT

## 2018-01-14 PROCEDURE — 99283 EMERGENCY DEPT VISIT LOW MDM: CPT

## 2018-01-14 PROCEDURE — 71046 X-RAY EXAM CHEST 2 VIEWS: CPT

## 2018-01-14 PROCEDURE — 85025 COMPLETE CBC W/AUTO DIFF WBC: CPT | Performed by: PHYSICIAN ASSISTANT

## 2018-01-14 PROCEDURE — 25010000002 METHYLPREDNISOLONE PER 40 MG: Performed by: PHYSICIAN ASSISTANT

## 2018-01-14 PROCEDURE — 71046 X-RAY EXAM CHEST 2 VIEWS: CPT | Performed by: RADIOLOGY

## 2018-01-14 RX ORDER — METHYLPREDNISOLONE 4 MG/1
TABLET ORAL
Qty: 21 TABLET | Refills: 0 | Status: SHIPPED | OUTPATIENT
Start: 2018-01-14 | End: 2018-02-13

## 2018-01-14 RX ORDER — METHYLPREDNISOLONE SODIUM SUCCINATE 40 MG/ML
80 INJECTION, POWDER, LYOPHILIZED, FOR SOLUTION INTRAMUSCULAR; INTRAVENOUS ONCE
Status: COMPLETED | OUTPATIENT
Start: 2018-01-14 | End: 2018-01-14

## 2018-01-14 RX ORDER — OSELTAMIVIR PHOSPHATE 75 MG/1
75 CAPSULE ORAL 2 TIMES DAILY
Qty: 10 CAPSULE | Refills: 0 | Status: SHIPPED | OUTPATIENT
Start: 2018-01-14 | End: 2018-02-13

## 2018-01-14 RX ORDER — IPRATROPIUM BROMIDE AND ALBUTEROL SULFATE 2.5; .5 MG/3ML; MG/3ML
3 SOLUTION RESPIRATORY (INHALATION) ONCE
Status: DISCONTINUED | OUTPATIENT
Start: 2018-01-14 | End: 2018-01-14 | Stop reason: HOSPADM

## 2018-01-14 RX ADMIN — METHYLPREDNISOLONE SODIUM SUCCINATE 80 MG: 40 INJECTION, POWDER, FOR SOLUTION INTRAMUSCULAR; INTRAVENOUS at 08:57

## 2018-01-14 NOTE — ED PROVIDER NOTES
"Subjective   Patient is a 47 y.o. female presenting with cough.   Cough   Cough characteristics:  Productive  Sputum characteristics:  Yellow  Severity:  Moderate  Onset quality:  Sudden  Duration:  2 days  Timing:  Constant  Progression:  Worsening  Chronicity:  New  Context comment:  Patient reports her entire family been sick.  A grandchild did test positive for flu.  She reports sudden onset of cough congestion and body aches and fever today.  She was seen yesterday but feels worse.  Worsened by:  Nothing  Associated symptoms: fever, sinus congestion and wheezing    Associated symptoms: no chest pain        Review of Systems   Constitutional: Positive for fever.   HENT: Negative.    Respiratory: Positive for cough and wheezing.    Cardiovascular: Negative.  Negative for chest pain.   Gastrointestinal: Negative.  Negative for abdominal pain.   Endocrine: Negative.    Genitourinary: Negative.  Negative for dysuria.   Skin: Negative.    Neurological: Negative.    Psychiatric/Behavioral: Negative.    All other systems reviewed and are negative.      Past Medical History:   Diagnosis Date   • Anxiety    • Asthma     mild   • Back pain    • Depression    • Environmental allergies     pollen, pollution   • Family history of pseudocholinesterase deficiency     sister, dad had it.  Unsure if she has it, but reports has never had succinylcholine   • Fatigue    • GERD (gastroesophageal reflux disease)     rarely, associated with onions, PRN Tums, EGD with Dr. Rich 2017, no findings per patient   • H. pylori infection     symptoms of abodminal pain/dyspepsia, tx   • Hyperlipidemia    • Hypertension    • IBS (irritable bowel syndrome)     constipation   • Interstitial cystitis     recent procedure to \"stretch\" the bladder, feels better; has pain in bladder as primary symptom, dyspareunia   • Mitral valve regurgitation     better now, has no symptoms, + hear murmur   • Mood disorder     no formal dx of bipolar disorder, but " "after bad divorce took lithium   • Nausea     car sickness, has PRN Zofran, motion sickness, previously took meclizine   • Obstructive sleep apnea on CPAP     CPAP compliant   • Peripheral edema    • PONV (postoperative nausea and vomiting)    • Prediabetes    • Psoriasis    • Psoriatic arthritis        Allergies   Allergen Reactions   • Amlodipine GI Intolerance and Arrhythmia   • Morphine And Related Itching     Dilaudid ok, percocet/lortab ok   • Nifedipine GI Intolerance and Arrhythmia     Adalat, procardia   • Adhesive Tape Rash     Can tolerate steristrips and skin glue   • Diazepam Anxiety     \"reverse effect,\" \"makes me absolutely crazy\"   • Midazolam Anxiety   • Sulfa Antibiotics Rash       Past Surgical History:   Procedure Laterality Date   • CARDIAC CATHETERIZATION      done after discovery of MVP, also had a CHERISE, normal per patient   •  SECTION     • COLONOSCOPY     • CYSTOSCOPY BLADDER HYDRODISTENSION N/A 2017    Procedure: CYSTOSCOPY BLADDER HYDRODISTENSION;  Surgeon: Ion Herbert MD;  Location: [facility] OR;  Service:    • DIAGNOSTIC LAPAROSCOPY N/A 10/14/2016    Procedure: DIAGNOSTIC LAPAROSCOPY POSSIBLE RIGHT SALPINGOOPHORECTOMY;  Surgeon: Rory Owens DO;  Location: [facility] OR;  Service:    • ENDOSCOPY      Dr. Rich   • FOOT SURGERY  1984   • HYSTERECTOMY  , 2016    laparoscopy supra-cervical hysterectomy ,  cervix and 1 ovary removed.  Remaining ovary has a cyst. initially done for pelvic pain/fibroids   • LAPAROSCOPIC APPENDECTOMY     • TRACHELECTOMY N/A 10/14/2016    Procedure: TRACHELECTOMY VAGINAL;  Surgeon: Rory Owens DO;  Location: [facility] OR;  Service:    • TUBAL ABDOMINAL LIGATION     • UMBILICAL HERNIA REPAIR N/A 10/14/2016    Procedure: UMBILICAL HERNIA REPAIR;  Surgeon: Spike Porter MD;  Location: [facility] OR;  Service:    • UMBILICAL HERNIA REPAIR N/A 2016    Procedure: UMBILICAL HERNIA REPAIR;  " Surgeon: Spike Porter MD;  Location: [facility] OR;  with mesh, supraumbilical   • WISDOM TOOTH EXTRACTION  2000       Family History   Problem Relation Age of Onset   • Diabetes Mother    • Hypertension Mother    • Stroke Mother    • Heart disease Mother    • Cancer Father    • Sleep apnea Father    • Diabetes Father    • Anesthesia problems Sister    • Heart attack Maternal Grandmother 70   • Obesity Maternal Grandmother    • Hypertension Maternal Grandmother    • Heart disease Maternal Grandmother    • Sleep apnea Maternal Grandmother    • Stroke Maternal Grandmother    • Heart attack Maternal Grandfather    • Heart disease Maternal Grandfather    • Heart attack Paternal Grandmother 54   • Sleep apnea Paternal Grandmother    • Heart disease Paternal Grandmother    • Hypertension Paternal Grandmother    • Obesity Paternal Grandmother    • Rheum arthritis Neg Hx    • Osteoarthritis Neg Hx    • Asthma Neg Hx    • Heart failure Neg Hx    • Hyperlipidemia Neg Hx    • Migraines Neg Hx    • Rashes / Skin problems Neg Hx    • Seizures Neg Hx    • Thyroid disease Neg Hx        Social History     Social History   • Marital status:      Spouse name: N/A   • Number of children: N/A   • Years of education: N/A     Social History Main Topics   • Smoking status: Former Smoker     Packs/day: 0.50     Years: 10.00     Types: Cigarettes     Quit date: 1/1/2013   • Smokeless tobacco: Never Used      Comment: Is around secondhand smoke occasionally in car but not in house   • Alcohol use No      Comment: on occassion socially   • Drug use: No   • Sexual activity: Defer     Other Topics Concern   • None     Social History 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             Objective   Physical Exam   Constitutional: She is oriented to person, place, and time. She appears well-developed and well-nourished. No distress.   HENT:   Head: Normocephalic and atraumatic.   Right Ear: External ear normal. Tympanic membrane is  injected.   Left Ear: External ear normal. Tympanic membrane is injected.   Nose: Nose normal.   Eyes: Conjunctivae and EOM are normal. Pupils are equal, round, and reactive to light.   Neck: Normal range of motion. Neck supple. No JVD present. No tracheal deviation present.   Cardiovascular: Normal rate, regular rhythm and normal heart sounds.    No murmur heard.  Pulmonary/Chest: Effort normal and breath sounds normal. No respiratory distress. She has no wheezes.   Abdominal: Soft. Bowel sounds are normal. There is no tenderness.   Musculoskeletal: Normal range of motion. She exhibits no edema or deformity.   Neurological: She is alert and oriented to person, place, and time. No cranial nerve deficit.   Skin: Skin is warm and dry. No rash noted. She is not diaphoretic. No erythema. No pallor.   Psychiatric: She has a normal mood and affect. Her behavior is normal. Thought content normal.   Nursing note and vitals reviewed.      Procedures         ED Course  ED Course   Value Comment By Time   ECG 12 Lead Normal sinus rhythm no acute ST changes rate 94 per AMANDA Vergara 01/14 0900                  MDM  Number of Diagnoses or Management Options  Influenza A: new and requires workup     Amount and/or Complexity of Data Reviewed  Clinical lab tests: ordered and reviewed  Tests in the radiology section of CPT®: ordered and reviewed  Discuss the patient with other providers: yes  Independent visualization of images, tracings, or specimens: yes    Risk of Complications, Morbidity, and/or Mortality  Presenting problems: moderate        Final diagnoses:   Influenza A            AMANDA Jones  01/14/18 1305

## 2018-01-31 DIAGNOSIS — R06.00 DYSPNEA, UNSPECIFIED TYPE: Primary | ICD-10-CM

## 2018-01-31 DIAGNOSIS — R53.83 FATIGUE, UNSPECIFIED TYPE: ICD-10-CM

## 2018-02-07 DIAGNOSIS — R06.00 DYSPNEA, UNSPECIFIED TYPE: ICD-10-CM

## 2018-02-07 DIAGNOSIS — R53.83 FATIGUE, UNSPECIFIED TYPE: ICD-10-CM

## 2018-02-13 ENCOUNTER — CONSULT (OUTPATIENT)
Dept: BARIATRICS/WEIGHT MGMT | Facility: CLINIC | Age: 48
End: 2018-02-13

## 2018-02-13 VITALS
OXYGEN SATURATION: 99 % | BODY MASS INDEX: 51.43 KG/M2 | HEART RATE: 78 BPM | DIASTOLIC BLOOD PRESSURE: 82 MMHG | TEMPERATURE: 97.9 F | RESPIRATION RATE: 18 BRPM | WEIGHT: 279.5 LBS | HEIGHT: 62 IN | SYSTOLIC BLOOD PRESSURE: 128 MMHG

## 2018-02-13 DIAGNOSIS — E66.01 MORBID OBESITY WITH BODY MASS INDEX (BMI) OF 50.0 TO 59.9 IN ADULT (HCC): Primary | ICD-10-CM

## 2018-02-13 PROCEDURE — 99215 OFFICE O/P EST HI 40 MIN: CPT | Performed by: SURGERY

## 2018-02-13 RX ORDER — SCOLOPAMINE TRANSDERMAL SYSTEM 1 MG/1
1 PATCH, EXTENDED RELEASE TRANSDERMAL ONCE
Status: CANCELLED | OUTPATIENT
Start: 2018-02-13 | End: 2018-02-13

## 2018-02-13 RX ORDER — SODIUM CHLORIDE 0.9 % (FLUSH) 0.9 %
1-10 SYRINGE (ML) INJECTION AS NEEDED
Status: CANCELLED | OUTPATIENT
Start: 2018-02-13

## 2018-02-13 RX ORDER — CHLORHEXIDINE GLUCONATE 0.12 MG/ML
15 RINSE ORAL ONCE
Status: CANCELLED | OUTPATIENT
Start: 2018-02-13

## 2018-02-13 RX ORDER — AZITHROMYCIN 250 MG/1
250 TABLET, FILM COATED ORAL DAILY
COMMUNITY
End: 2018-03-21

## 2018-02-13 RX ORDER — ACETAMINOPHEN 325 MG/1
650 TABLET ORAL ONCE
Status: CANCELLED | OUTPATIENT
Start: 2018-02-13 | End: 2018-02-13

## 2018-02-13 RX ORDER — ACETAMINOPHEN 500 MG
500 TABLET ORAL EVERY 6 HOURS PRN
COMMUNITY
End: 2018-02-25 | Stop reason: HOSPADM

## 2018-02-13 RX ORDER — SODIUM CHLORIDE, SODIUM LACTATE, POTASSIUM CHLORIDE, CALCIUM CHLORIDE 600; 310; 30; 20 MG/100ML; MG/100ML; MG/100ML; MG/100ML
100 INJECTION, SOLUTION INTRAVENOUS CONTINUOUS
Status: CANCELLED | OUTPATIENT
Start: 2018-02-13

## 2018-02-13 RX ORDER — LOSARTAN POTASSIUM 50 MG/1
50 TABLET ORAL 2 TIMES DAILY
COMMUNITY
End: 2018-08-29

## 2018-02-13 RX ORDER — PANTOPRAZOLE SODIUM 40 MG/10ML
40 INJECTION, POWDER, LYOPHILIZED, FOR SOLUTION INTRAVENOUS ONCE
Status: CANCELLED | OUTPATIENT
Start: 2018-02-13 | End: 2018-02-13

## 2018-02-13 RX ORDER — HYDROCHLOROTHIAZIDE 25 MG/1
25 TABLET ORAL DAILY
COMMUNITY
End: 2018-02-25 | Stop reason: HOSPADM

## 2018-02-13 NOTE — PROGRESS NOTES
"[facility] BARIATRIC SURGERY  [address]  [facility] [address]  [phone]      Patient  Name:  Shaun Morales  :  1970      Date of Visit: 2018      Chief Complaint:  weight gain; unable to maintain weight loss. Preop LSG    History of Present Illness:  Shaun Morales is a 47 y.o. female who presents today for evaluation, education and consultation regarding weight loss surgery. The patient is interested in sleeve gastrectomy.     Shaun has been overweight for at least 27 years, has been 35 pounds or more overweight for at least 27 years, has been 100 pounds or more overweight for 25 or more years and started dieting at age 17.  The patient describes their eating habits as volume eater.      Previous diet attempts include: Eh Wynn, High Protein, Low Carbohydrate, Low Fat, Calorie Counting, Terrence's Diet, Cashion, Fasting and Slim Fast; Diet Center; Amphetamines and Prozac.  The most weight Shaun lost was 35 pounds on low carb but was only able to maintain that weight loss for 8 months.  Her maximum lifetime weight is 296 pounds.    Several women she knows who work at [facility] [employer] in [place] have had the sleeve with Dr. Mckeon and are doing great!  She has been seeing Dr. Rich in [place] and reportedly has done all the things needed to have a lap band but has decided she'd rather have a sleeve.      Notes no problems when eating fatty/greasy foods.  In the past, has had abdominal pain/vomiting, and workup with GBUS was no gallstones, and HIDA was 43%.  No pain w CCK injxn.  H. Pylori was found and treated with resolution of symptoms.    The patient returns for final visit prior to LSG.  Original intake evaluation Dr. WILLIS  reviewed.  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  Has been looking into WLS since , was going to have band w Dr. Rich.  Says he's very persuasive, \"\".  Has always wanted LSG. Says everyone she knows that " "works at [facility] [employer] w a band came to me to have it converted to LSG.  Aware at her BMI LSG may be 1st stage proc.  he patient has had issues with morbid obesity for years and only temporary success with non-surgical methods of weight loss.  The patient is seeking LSG to help with the morbid obesity related conditions of anxiety, ashtma, SREE, back pain, LATRICIA, HLD, HTN, IBS, edema, preDM, psoriasis w arthritis.  Had steroid round for URI about a month ago.  Chronic sinus/ear issues, currently on a zpak.      Past Medical History:   Diagnosis Date   • Anxiety    • Asthma     mild   • Back pain    • Depression    • Environmental allergies     pollen, pollution   • Family history of pseudocholinesterase deficiency     sister, dad had it.  Unsure if she has it, but reports has never had succinylcholine   • Fatigue    • GERD (gastroesophageal reflux disease)     rarely, associated with onions and red sauces, avoids both.  PRN Tums, EGD with Dr. Rich 2017, op report reviewed, no HH. urease neg. serum h. pyl neg   • H. pylori infection     symptoms of abodminal pain/dyspepsia, tx   • Hyperlipidemia    • Hypertension    • IBS (irritable bowel syndrome)     constipation   • Interstitial cystitis     recent procedure to \"stretch\" the bladder, feels better; has pain in bladder as primary symptom, dyspareunia   • Mitral valve regurgitation     better now, has no symptoms, + hear murmur   • Mood disorder     no formal dx of bipolar disorder, but after bad divorce took lithium   • Nausea     car sickness, has PRN Zofran, motion sickness, previously took meclizine   • Obstructive sleep apnea on CPAP     CPAP compliant   • Peripheral edema    • PONV (postoperative nausea and vomiting)    • Prediabetes    • Psoriasis    • Psoriatic arthritis    • Trauma of chest     2014 moving trailer, fell on her, several cracked ribs on left, feels it caused her umbo hernia recurrence.  had to be dug out.  Exhusband fx pelvis, mult " "surgeries.   • Vitamin D deficiency      Past Surgical History:   Procedure Laterality Date   • CARDIAC CATHETERIZATION      done after discovery of MVP, also had a CHERISE, normal per patient   •  SECTION     • COLONOSCOPY     • CYSTOSCOPY BLADDER HYDRODISTENSION N/A 2017    Procedure: CYSTOSCOPY BLADDER HYDRODISTENSION;  Surgeon: Ion Herbert MD;  Location: [facility] OR;  Service:    • DIAGNOSTIC LAPAROSCOPY N/A 10/14/2016    Procedure: DIAGNOSTIC LAPAROSCOPY POSSIBLE RIGHT SALPINGOOPHORECTOMY;  Surgeon: Rory Owens DO;  Location: [facility] OR;  Service:    • ENDOSCOPY      Dr. Rich   • FOOT SURGERY     • HYSTERECTOMY  , 2016    laparoscopy supra-cervical hysterectomy ,  cervix and 1 ovary removed.  Remaining ovary has a cyst. initially done for pelvic pain/fibroids   • LAPAROSCOPIC APPENDECTOMY     • TRACHELECTOMY N/A 10/14/2016    Procedure: TRACHELECTOMY VAGINAL;  Surgeon: Rory Owens DO;  Location: [facility] OR;  Service:    • TUBAL ABDOMINAL LIGATION     • UMBILICAL HERNIA REPAIR N/A 10/14/2016    Procedure: UMBILICAL HERNIA REPAIR;  Surgeon: Spike Porter MD;  Location: [facility] OR;  Service:    • UMBILICAL HERNIA REPAIR N/A 2016    Procedure: UMBILICAL HERNIA REPAIR;  Surgeon: Spike Porter MD;  Location: [facility] OR;  with mesh, supraumbilical   • WISDOM TOOTH EXTRACTION       Denies hx blood transfusion or wound infxns.  Umbo hernia repair w mesh done open per pt    Allergies   Allergen Reactions   • Amlodipine GI Intolerance and Arrhythmia   • Morphine And Related Itching     Dilaudid ok, percocet/lortab ok   • Nifedipine GI Intolerance and Arrhythmia     Adalat, procardia   • Adhesive Tape Rash     Can tolerate steristrips and skin glue   • Diazepam Anxiety     \"reverse effect,\" \"makes me absolutely crazy\"   • Midazolam Anxiety   • Sulfa Antibiotics Rash       Current Outpatient Prescriptions:   •  acetaminophen (TYLENOL) " 500 MG tablet, Take 500 mg by mouth Every 6 (Six) Hours As Needed for Mild Pain ., Disp: , Rfl:   •  albuterol (PROVENTIL HFA;VENTOLIN HFA) 108 (90 BASE) MCG/ACT inhaler, Inhale 2 puffs every 4 (four) hours as needed for wheezing, Disp: , Rfl:   •  azithromycin (ZITHROMAX) 250 MG tablet, Take 250 mg by mouth Daily. Take 2 tablets the first day, then 1 tablet daily for 4 days., Disp: , Rfl:   •  cetirizine (ZyrTEC) 10 MG tablet, Take 10 mg by mouth daily, Disp: , Rfl:   •  hydrochlorothiazide (HYDRODIURIL) 25 MG tablet, Take 25 mg by mouth Daily., Disp: , Rfl:   •  losartan (COZAAR) 50 MG tablet, Take 50 mg by mouth 2 (Two) Times a Day., Disp: , Rfl:   •  metoprolol succinate XL (TOPROL-XL) 50 MG 24 hr tablet, Take 50 mg by mouth daily, Disp: , Rfl:   •  montelukast (SINGULAIR) 10 MG tablet, Take 10 mg by mouth Daily., Disp: , Rfl:   •  simvastatin (ZOCOR) 10 MG tablet, Take 10 mg by mouth Every Night., Disp: , Rfl:   •  lubiprostone (AMITIZA) 8 MCG capsule, Take 8 mcg by mouth Daily, Disp: , Rfl:     Social History     Social History   • Marital status:      Spouse name: N/A   • Number of children: N/A   • Years of education: N/A     Occupational History   • Not on file.     Social History Main Topics   • Smoking status: Former Smoker     Packs/day: 0.50     Years: 10.00     Types: Cigarettes     Quit date: 1/1/2013   • Smokeless tobacco: Never Used      Comment: Is around secondhand smoke occasionally in car but not in house.  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       Family History   Problem Relation Age of Onset   • Diabetes Mother    • Hypertension Mother    • Stroke Mother    • Heart disease Mother    • Cancer Father    • Sleep apnea Father    • Diabetes Father    • Anesthesia problems Sister    •  Heart attack Maternal Grandmother 70   • Obesity Maternal Grandmother    • Hypertension Maternal Grandmother    • Heart disease Maternal Grandmother    • Sleep apnea Maternal Grandmother    • Stroke Maternal Grandmother    • Heart attack Maternal Grandfather    • Heart disease Maternal Grandfather    • Heart attack Paternal Grandmother 54   • Sleep apnea Paternal Grandmother    • Heart disease Paternal Grandmother    • Hypertension Paternal Grandmother    • Obesity Paternal Grandmother    • Rheum arthritis Neg Hx    • Osteoarthritis Neg Hx    • Asthma Neg Hx    • Heart failure Neg Hx    • Hyperlipidemia Neg Hx    • Migraines Neg Hx    • Rashes / Skin problems Neg Hx    • Seizures Neg Hx    • Thyroid disease Neg Hx        Review of Systems:  Constitutional:  The patient reports weight gain, fatigue and denies fevers, chills and fatigue.  Cardiovascular:  The patient reports HTN, HLD, edema and denies CP, MI, heart disease and DVT.  Respiratory:  The patient reports asthma, apnea and denies PE.  Gastrointestinal:  The patient reports heartburn, IBS and denies pancreatitis and liver disease.  Genitourinary:  The patient denies renal insufficiency.    Musculoskeletal:  The patient reports joint pain, back pain, arthritis, autoimmune disease and denies fibromyalgia.  Neurological:  The patient reports none and denies seizure and stroke.  Psychiatric:  The patient reports anxiety, depression and denies bipolar disorder.  Endocrine:  The patient reports glucose intolerance and denies thyroid disease and gout.  Hematologic:  The patient reports none and denies anemia and bleeding disorder.  Skin:  The patient reports MRSA (+ screen from urine, never had an infection).    Physical Exam:  Vital Signs:  Weight: 127 kg (279 lb 8 oz)   Body mass index is 51.12 kg/(m^2).  Temp: 97.9 °F (36.6 °C)   Heart Rate: 78   BP: 128/82     Physical Exam   Constitutional: She is oriented to person, place, and time. She appears well-developed  and well-nourished.   HENT:   Head: Normocephalic and atraumatic.   Mouth/Throat: No oropharyngeal exudate.   Eyes: Conjunctivae and EOM are normal. Pupils are equal, round, and reactive to light. No scleral icterus.   Neck: Normal range of motion. Neck supple. Carotid bruit is not present. No tracheal deviation present. No thyromegaly present.   Cardiovascular: Normal rate and regular rhythm.    Murmur heard.   Systolic murmur is present with a grade of 3/6   Left 2nd ICS   Pulmonary/Chest: Effort normal and breath sounds normal. No respiratory distress.   Abdominal: Soft. She exhibits no distension. There is no tenderness.       Musculoskeletal: Normal range of motion. She exhibits no edema or deformity.   Neurological: She is alert and oriented to person, place, and time. No cranial nerve deficit.   Skin: Skin is warm and dry. No rash noted. She is not diaphoretic. No erythema.   Psychiatric: She has a normal mood and affect. Her behavior is normal. Judgment and thought content normal.       Patient Active Problem List   Diagnosis   • S/P laparoscopic supracervical hysterectomy   • Status post umbilical hernia repair, follow-up exam   • Umbilical hernia without obstruction and without gangrene   • Morbid obesity with BMI of 50.0-59.9, adult   • Chest pain   • Interstitial cystitis   • Abdominal pain   Psych Brown 5/17 approp  Jeremy -  in past    Assessment:    Shaun Morales is a 47 y.o. year old female with medically complicated obesity pursuing sleeve gastrectomy.    Weight loss surgery is deemed medically necessary given the following obesity related comorbidities including sleep apnea, diabetes, hypertension, dyslipidemia, osteoarthritis, back pain, knee pain, GERD, asthma, edema and depression with current Weight: 127 kg (279 lb 8 oz) and Body mass index is 51.12 kg/(m^2)..    Patient is aware that surgery is a tool, and that weight loss is not guaranteed but only seen in the context of appropriate use,  "follow up and exercise.    The patient was present for an approximately a 2.5 hour discussion of the purpose of weight loss surgery, how WLS is a tool to assist in achieving weight loss goals, the most common complications and how best to avoid them, and the strategies for short and long term weight loss.  Ample opportunity to discuss questions was available both in group and during the time of individual examination.    I reviewed all available preop labs, Xrays, tests, clearances, etc and signed off on these in the record.  All of this in addition to the patient's unique history and exam has been taken into consideration in determining their appropriate candidacy for weight loss surgery.    Complications  of laparoscopic/possible robotic gastric sleeve were discussed. The patient is well aware of the potential complications of surgery that include but not limited to bleeding, infections, deep venous thrombosis, pulmonary embolism, pulmonary complications such as pneumonia, cardiac events, hernias, small bowel obstruction, damage to the spleen or other organs, bowel injury, disfiguring scars, failure to lose weight, need for additional surgery, conversion to an open procedure, and death. Patient is also aware of complications which apply in this particular procedure that can include but are not limited to a \"leak\" at the staple line which in some instances may require conversion to gastric bypass.    The patient is aware if a hiatal hernia is encountered, it likely will be repaired.  R/B/A Rx to hiatal hernia repair were discussed as outlined in our long consent form.  Briefly risks in addition to those for LSG include recurrent hernia, LATRICIA, dysphagia, esophageal injury, pneumothorax, injury to the vagus nerves, injury to the thoracic duct, aorta or vena cava.    Greater than 3 minutes was spent with the patient discussing avoiding all tobacco products and second hand smoke at least 2 weeks pre-operatively and 6 " weeks post-operatively to minimize the risk of sleeve leak.  This included discussing the importance of avoiding even secondhand smoke as the risk of leak is increased.  Examples discussed:  I made it very clear that the patient understands they should avoid even riding in a car where someone has previously smoked in the last 2 weeks, living in a house where someone smokes (even if it's in a separate room/patio/attached garage, etc.) we discussed that they should not have a conversation with a group of people who are smoking even if it's outside.  They can be around wood burning fires and barbecue.  I told them I do not know if marijuana has a same effects but my overall recommendation is to avoid it for 2 weeks prior in 6 weeks after surgery.  They also are aware that nicotine may also increase the risk of leak and I strongly encouraged him to avoid that as well for 2 weeks prior in 6 weeks after surgery.    Discussed the risks, benefits and alternative therapies at great length as outlined in our extensive consent forms, consent videos, and educational teaching process under the direction of the center's .    A copy of the patient's signed informed consent is on file.    R/B/A Rx discussed to postop anticoagulation incl but not limited to bleeding, drug reaction, venothromboembolic events, etc. and agreeable to taking post op  Heparin ([employer] won't cover lovenox or Eliquis) - prescript 5000u Q 8 hrs #63 and syringes given for post op.    Plan:  ASHKAN Mckeon MD

## 2018-02-14 PROBLEM — E66.01 MORBID OBESITY WITH BODY MASS INDEX (BMI) OF 50.0 TO 59.9 IN ADULT: Status: ACTIVE | Noted: 2018-02-14

## 2018-02-22 ENCOUNTER — APPOINTMENT (OUTPATIENT)
Dept: PREADMISSION TESTING | Facility: HOSPITAL | Age: 48
End: 2018-02-22

## 2018-02-22 ENCOUNTER — ANESTHESIA EVENT (OUTPATIENT)
Dept: PERIOP | Facility: HOSPITAL | Age: 48
End: 2018-02-22

## 2018-02-22 LAB
DEPRECATED RDW RBC AUTO: 43.3 FL (ref 37–54)
ERYTHROCYTE [DISTWIDTH] IN BLOOD BY AUTOMATED COUNT: 13 % (ref 11.3–14.5)
HBA1C MFR BLD: 6.4 % (ref 4.8–5.6)
HCT VFR BLD AUTO: 40.5 % (ref 34.5–44)
HGB BLD-MCNC: 13.8 G/DL (ref 11.5–15.5)
MCH RBC QN AUTO: 30.9 PG (ref 27–31)
MCHC RBC AUTO-ENTMCNC: 34.1 G/DL (ref 32–36)
MCV RBC AUTO: 90.6 FL (ref 80–99)
PLATELET # BLD AUTO: 356 10*3/MM3 (ref 150–450)
PMV BLD AUTO: 10.8 FL (ref 6–12)
POTASSIUM BLD-SCNC: 3.8 MMOL/L (ref 3.5–5.5)
RBC # BLD AUTO: 4.47 10*6/MM3 (ref 3.89–5.14)
WBC NRBC COR # BLD: 9.36 10*3/MM3 (ref 3.5–10.8)

## 2018-02-22 PROCEDURE — 83036 HEMOGLOBIN GLYCOSYLATED A1C: CPT | Performed by: ANESTHESIOLOGY

## 2018-02-22 PROCEDURE — 85027 COMPLETE CBC AUTOMATED: CPT | Performed by: ANESTHESIOLOGY

## 2018-02-22 PROCEDURE — 84132 ASSAY OF SERUM POTASSIUM: CPT | Performed by: ANESTHESIOLOGY

## 2018-02-22 PROCEDURE — 36415 COLL VENOUS BLD VENIPUNCTURE: CPT

## 2018-02-22 RX ORDER — FAMOTIDINE 10 MG/ML
20 INJECTION, SOLUTION INTRAVENOUS ONCE
Status: CANCELLED | OUTPATIENT
Start: 2018-02-22 | End: 2018-02-22

## 2018-02-22 RX ORDER — FAMOTIDINE 20 MG/1
20 TABLET, FILM COATED ORAL ONCE
Status: CANCELLED | OUTPATIENT
Start: 2018-02-22 | End: 2018-02-22

## 2018-02-23 ENCOUNTER — ANESTHESIA (OUTPATIENT)
Dept: PERIOP | Facility: HOSPITAL | Age: 48
End: 2018-02-23

## 2018-02-23 ENCOUNTER — HOSPITAL ENCOUNTER (INPATIENT)
Facility: HOSPITAL | Age: 48
LOS: 2 days | Discharge: HOME | End: 2018-02-25
Attending: SURGERY | Admitting: SURGERY

## 2018-02-23 DIAGNOSIS — E66.01 MORBID OBESITY WITH BODY MASS INDEX (BMI) OF 50.0 TO 59.9 IN ADULT (HCC): ICD-10-CM

## 2018-02-23 PROCEDURE — 25010000002 BUPRENORPHINE PER 0.1 MG: Performed by: NURSE ANESTHETIST, CERTIFIED REGISTERED

## 2018-02-23 PROCEDURE — 25010000002 PROPOFOL 10 MG/ML EMULSION: Performed by: NURSE ANESTHETIST, CERTIFIED REGISTERED

## 2018-02-23 PROCEDURE — 94760 N-INVAS EAR/PLS OXIMETRY 1: CPT

## 2018-02-23 PROCEDURE — 0BQT4ZZ REPAIR DIAPHRAGM, PERCUTANEOUS ENDOSCOPIC APPROACH: ICD-10-PCS | Performed by: SURGERY

## 2018-02-23 PROCEDURE — 25010000002 ENOXAPARIN PER 10 MG: Performed by: SURGERY

## 2018-02-23 PROCEDURE — 43281 LAP PARAESOPHAG HERN REPAIR: CPT | Performed by: SURGERY

## 2018-02-23 PROCEDURE — 25010000003 CEFAZOLIN IN DEXTROSE 2-4 GM/100ML-% SOLUTION: Performed by: SURGERY

## 2018-02-23 PROCEDURE — 25010000002 PROMETHAZINE PER 50 MG: Performed by: NURSE ANESTHETIST, CERTIFIED REGISTERED

## 2018-02-23 PROCEDURE — 94799 UNLISTED PULMONARY SVC/PX: CPT

## 2018-02-23 PROCEDURE — 25010000002 FENTANYL CITRATE (PF) 100 MCG/2ML SOLUTION: Performed by: NURSE ANESTHETIST, CERTIFIED REGISTERED

## 2018-02-23 PROCEDURE — 0DB64Z3 EXCISION OF STOMACH, PERCUTANEOUS ENDOSCOPIC APPROACH, VERTICAL: ICD-10-PCS | Performed by: SURGERY

## 2018-02-23 PROCEDURE — 88307 TISSUE EXAM BY PATHOLOGIST: CPT | Performed by: SURGERY

## 2018-02-23 PROCEDURE — 25010000002 NEOSTIGMINE 10 MG/10ML SOLUTION: Performed by: NURSE ANESTHETIST, CERTIFIED REGISTERED

## 2018-02-23 PROCEDURE — 94660 CPAP INITIATION&MGMT: CPT

## 2018-02-23 PROCEDURE — 25010000002 DEXAMETHASONE SODIUM PHOSPHATE 10 MG/ML SOLUTION 1 ML VIAL: Performed by: NURSE ANESTHETIST, CERTIFIED REGISTERED

## 2018-02-23 PROCEDURE — 25010000002 ONDANSETRON PER 1 MG: Performed by: NURSE ANESTHETIST, CERTIFIED REGISTERED

## 2018-02-23 PROCEDURE — 25010000002 METOCLOPRAMIDE PER 10 MG: Performed by: SURGERY

## 2018-02-23 PROCEDURE — 25010000002 DEXAMETHASONE PER 1 MG: Performed by: NURSE ANESTHETIST, CERTIFIED REGISTERED

## 2018-02-23 PROCEDURE — 0DJ08ZZ INSPECTION OF UPPER INTESTINAL TRACT, VIA NATURAL OR ARTIFICIAL OPENING ENDOSCOPIC: ICD-10-PCS | Performed by: SURGERY

## 2018-02-23 PROCEDURE — 43775 LAP SLEEVE GASTRECTOMY: CPT | Performed by: SURGERY

## 2018-02-23 DEVICE — SEALANT FIBRIN TISSEEL FZ 4ML: Type: IMPLANTABLE DEVICE | Site: STOMACH | Status: FUNCTIONAL

## 2018-02-23 DEVICE — PERI-STRIPS DRY WITH VERITAS COLLAGEN MATRIX (PSD-V) IS PREPARED FROM DEHYDRATED BOVINE PERICARDIUM PROCURED FROM CATTLE UNDER 30 MONTHS OF AGE IN THE UNITED STATES. ONE (1) TUBE OF PSD GEL (GEL) IS PROVIDED FOR EVERY TWO (2) POUCHES OF PSD-V. THE GEL IS USED TO CREATE A TEMPORARY BOND BETWEEN THE PSD-V BUTTRESS AND THE SURGICAL STAPLER JAWS UNTIL THE STAPLER IS POSITIONED AND FIRED.
Type: IMPLANTABLE DEVICE | Site: STOMACH | Status: FUNCTIONAL
Brand: PERI-STRIPS DRY WITH VERITAS COLLAGEN MATRIX

## 2018-02-23 RX ORDER — SODIUM CHLORIDE 0.9 % (FLUSH) 0.9 %
1-10 SYRINGE (ML) INJECTION AS NEEDED
Status: DISCONTINUED | OUTPATIENT
Start: 2018-02-23 | End: 2018-02-23 | Stop reason: HOSPADM

## 2018-02-23 RX ORDER — PROPOFOL 10 MG/ML
VIAL (ML) INTRAVENOUS CONTINUOUS PRN
Status: DISCONTINUED | OUTPATIENT
Start: 2018-02-23 | End: 2018-02-23 | Stop reason: SURG

## 2018-02-23 RX ORDER — FENTANYL CITRATE 50 UG/ML
INJECTION, SOLUTION INTRAMUSCULAR; INTRAVENOUS AS NEEDED
Status: DISCONTINUED | OUTPATIENT
Start: 2018-02-23 | End: 2018-02-23 | Stop reason: SURG

## 2018-02-23 RX ORDER — BUPRENORPHINE HYDROCHLORIDE 0.32 MG/ML
INJECTION INTRAMUSCULAR; INTRAVENOUS AS NEEDED
Status: DISCONTINUED | OUTPATIENT
Start: 2018-02-23 | End: 2018-02-23 | Stop reason: SURG

## 2018-02-23 RX ORDER — PROMETHAZINE HYDROCHLORIDE 25 MG/ML
12.5 INJECTION, SOLUTION INTRAMUSCULAR; INTRAVENOUS ONCE AS NEEDED
Status: COMPLETED | OUTPATIENT
Start: 2018-02-23 | End: 2018-02-23

## 2018-02-23 RX ORDER — BUPIVACAINE HYDROCHLORIDE 2.5 MG/ML
INJECTION, SOLUTION EPIDURAL; INFILTRATION; INTRACAUDAL AS NEEDED
Status: DISCONTINUED | OUTPATIENT
Start: 2018-02-23 | End: 2018-02-23 | Stop reason: SURG

## 2018-02-23 RX ORDER — CYANOCOBALAMIN 1000 UG/ML
1000 INJECTION, SOLUTION INTRAMUSCULAR; SUBCUTANEOUS ONCE
Status: DISCONTINUED | OUTPATIENT
Start: 2018-02-23 | End: 2018-02-23 | Stop reason: SDUPTHER

## 2018-02-23 RX ORDER — DIPHENHYDRAMINE HYDROCHLORIDE 50 MG/ML
25 INJECTION INTRAMUSCULAR; INTRAVENOUS EVERY 4 HOURS PRN
Status: DISCONTINUED | OUTPATIENT
Start: 2018-02-23 | End: 2018-02-23 | Stop reason: SDUPTHER

## 2018-02-23 RX ORDER — SODIUM CHLORIDE, SODIUM LACTATE, POTASSIUM CHLORIDE, CALCIUM CHLORIDE 600; 310; 30; 20 MG/100ML; MG/100ML; MG/100ML; MG/100ML
150 INJECTION, SOLUTION INTRAVENOUS CONTINUOUS
Status: DISCONTINUED | OUTPATIENT
Start: 2018-02-23 | End: 2018-02-25 | Stop reason: HOSPADM

## 2018-02-23 RX ORDER — MAGNESIUM HYDROXIDE 1200 MG/15ML
LIQUID ORAL AS NEEDED
Status: DISCONTINUED | OUTPATIENT
Start: 2018-02-23 | End: 2018-02-23 | Stop reason: HOSPADM

## 2018-02-23 RX ORDER — ALBUTEROL SULFATE 2.5 MG/3ML
2.5 SOLUTION RESPIRATORY (INHALATION) EVERY 4 HOURS PRN
Status: DISCONTINUED | OUTPATIENT
Start: 2018-02-23 | End: 2018-02-25 | Stop reason: HOSPADM

## 2018-02-23 RX ORDER — PROMETHAZINE HYDROCHLORIDE 25 MG/1
25 TABLET ORAL ONCE AS NEEDED
Status: COMPLETED | OUTPATIENT
Start: 2018-02-23 | End: 2018-02-23

## 2018-02-23 RX ORDER — PANTOPRAZOLE SODIUM 40 MG/10ML
40 INJECTION, POWDER, LYOPHILIZED, FOR SOLUTION INTRAVENOUS
Status: DISCONTINUED | OUTPATIENT
Start: 2018-02-24 | End: 2018-02-25 | Stop reason: HOSPADM

## 2018-02-23 RX ORDER — HYDROCODONE BITARTRATE AND ACETAMINOPHEN 7.5; 325 MG/1; MG/1
1 TABLET ORAL EVERY 4 HOURS PRN
Status: DISCONTINUED | OUTPATIENT
Start: 2018-02-23 | End: 2018-02-25 | Stop reason: HOSPADM

## 2018-02-23 RX ORDER — LIDOCAINE HYDROCHLORIDE 10 MG/ML
0.5 INJECTION, SOLUTION EPIDURAL; INFILTRATION; INTRACAUDAL; PERINEURAL ONCE AS NEEDED
Status: COMPLETED | OUTPATIENT
Start: 2018-02-23 | End: 2018-02-23

## 2018-02-23 RX ORDER — CLONIDINE HYDROCHLORIDE 0.1 MG/1
0.1 TABLET ORAL EVERY 6 HOURS PRN
Status: DISCONTINUED | OUTPATIENT
Start: 2018-02-23 | End: 2018-02-25 | Stop reason: HOSPADM

## 2018-02-23 RX ORDER — FENTANYL CITRATE 50 UG/ML
50 INJECTION, SOLUTION INTRAMUSCULAR; INTRAVENOUS
Status: DISCONTINUED | OUTPATIENT
Start: 2018-02-23 | End: 2018-02-23 | Stop reason: HOSPADM

## 2018-02-23 RX ORDER — PROPOFOL 10 MG/ML
VIAL (ML) INTRAVENOUS AS NEEDED
Status: DISCONTINUED | OUTPATIENT
Start: 2018-02-23 | End: 2018-02-23 | Stop reason: SURG

## 2018-02-23 RX ORDER — CHLORHEXIDINE GLUCONATE 0.12 MG/ML
15 RINSE ORAL ONCE
Status: COMPLETED | OUTPATIENT
Start: 2018-02-23 | End: 2018-02-23

## 2018-02-23 RX ORDER — LORAZEPAM 2 MG/ML
0.5 INJECTION INTRAMUSCULAR EVERY 12 HOURS PRN
Status: DISCONTINUED | OUTPATIENT
Start: 2018-02-23 | End: 2018-02-25 | Stop reason: HOSPADM

## 2018-02-23 RX ORDER — LORAZEPAM 1 MG/1
1 TABLET ORAL EVERY 12 HOURS PRN
Status: DISCONTINUED | OUTPATIENT
Start: 2018-02-23 | End: 2018-02-23 | Stop reason: SDUPTHER

## 2018-02-23 RX ORDER — HYDROMORPHONE HYDROCHLORIDE 2 MG/1
2 TABLET ORAL EVERY 4 HOURS PRN
Status: DISCONTINUED | OUTPATIENT
Start: 2018-02-23 | End: 2018-02-25 | Stop reason: HOSPADM

## 2018-02-23 RX ORDER — CETIRIZINE HYDROCHLORIDE 10 MG/1
10 TABLET ORAL DAILY
Status: DISCONTINUED | OUTPATIENT
Start: 2018-02-24 | End: 2018-02-25 | Stop reason: HOSPADM

## 2018-02-23 RX ORDER — PANTOPRAZOLE SODIUM 40 MG/10ML
40 INJECTION, POWDER, LYOPHILIZED, FOR SOLUTION INTRAVENOUS ONCE
Status: COMPLETED | OUTPATIENT
Start: 2018-02-23 | End: 2018-02-23

## 2018-02-23 RX ORDER — GLYCOPYRROLATE 0.2 MG/ML
INJECTION INTRAMUSCULAR; INTRAVENOUS AS NEEDED
Status: DISCONTINUED | OUTPATIENT
Start: 2018-02-23 | End: 2018-02-23 | Stop reason: SURG

## 2018-02-23 RX ORDER — METOCLOPRAMIDE HYDROCHLORIDE 5 MG/ML
10 INJECTION INTRAMUSCULAR; INTRAVENOUS EVERY 6 HOURS
Status: DISCONTINUED | OUTPATIENT
Start: 2018-02-23 | End: 2018-02-25 | Stop reason: HOSPADM

## 2018-02-23 RX ORDER — ONDANSETRON 2 MG/ML
INJECTION INTRAMUSCULAR; INTRAVENOUS AS NEEDED
Status: DISCONTINUED | OUTPATIENT
Start: 2018-02-23 | End: 2018-02-23 | Stop reason: SURG

## 2018-02-23 RX ORDER — ONDANSETRON 4 MG/1
4 TABLET, FILM COATED ORAL EVERY 6 HOURS PRN
Status: DISCONTINUED | OUTPATIENT
Start: 2018-02-23 | End: 2018-02-25 | Stop reason: HOSPADM

## 2018-02-23 RX ORDER — SODIUM CHLORIDE 9 MG/ML
INJECTION, SOLUTION INTRAVENOUS AS NEEDED
Status: DISCONTINUED | OUTPATIENT
Start: 2018-02-23 | End: 2018-02-23 | Stop reason: HOSPADM

## 2018-02-23 RX ORDER — ACETAMINOPHEN 325 MG/1
650 TABLET ORAL ONCE
Status: COMPLETED | OUTPATIENT
Start: 2018-02-23 | End: 2018-02-23

## 2018-02-23 RX ORDER — PROMETHAZINE HYDROCHLORIDE 25 MG/ML
12.5 INJECTION, SOLUTION INTRAMUSCULAR; INTRAVENOUS EVERY 6 HOURS PRN
Status: DISCONTINUED | OUTPATIENT
Start: 2018-02-23 | End: 2018-02-23 | Stop reason: SDUPTHER

## 2018-02-23 RX ORDER — ALBUTEROL SULFATE 2.5 MG/3ML
2.5 SOLUTION RESPIRATORY (INHALATION) EVERY 4 HOURS PRN
Status: DISCONTINUED | OUTPATIENT
Start: 2018-02-23 | End: 2018-02-23 | Stop reason: SDUPTHER

## 2018-02-23 RX ORDER — LOSARTAN POTASSIUM 50 MG/1
50 TABLET ORAL 2 TIMES DAILY
Status: DISCONTINUED | OUTPATIENT
Start: 2018-02-23 | End: 2018-02-25 | Stop reason: HOSPADM

## 2018-02-23 RX ORDER — ONDANSETRON 2 MG/ML
4 INJECTION INTRAMUSCULAR; INTRAVENOUS EVERY 6 HOURS PRN
Status: DISCONTINUED | OUTPATIENT
Start: 2018-02-23 | End: 2018-02-25 | Stop reason: HOSPADM

## 2018-02-23 RX ORDER — ALBUTEROL SULFATE 2.5 MG/3ML
2.5 SOLUTION RESPIRATORY (INHALATION)
Status: DISCONTINUED | OUTPATIENT
Start: 2018-02-23 | End: 2018-02-25

## 2018-02-23 RX ORDER — CYANOCOBALAMIN 1000 UG/ML
1000 INJECTION, SOLUTION INTRAMUSCULAR; SUBCUTANEOUS ONCE
Status: COMPLETED | OUTPATIENT
Start: 2018-02-24 | End: 2018-02-24

## 2018-02-23 RX ORDER — ALBUTEROL SULFATE 90 UG/1
2 AEROSOL, METERED RESPIRATORY (INHALATION) EVERY 4 HOURS PRN
Status: DISCONTINUED | OUTPATIENT
Start: 2018-02-23 | End: 2018-02-23 | Stop reason: SDUPTHER

## 2018-02-23 RX ORDER — SCOLOPAMINE TRANSDERMAL SYSTEM 1 MG/1
1 PATCH, EXTENDED RELEASE TRANSDERMAL ONCE
Status: DISCONTINUED | OUTPATIENT
Start: 2018-02-23 | End: 2018-02-23

## 2018-02-23 RX ORDER — SODIUM CHLORIDE, SODIUM LACTATE, POTASSIUM CHLORIDE, CALCIUM CHLORIDE 600; 310; 30; 20 MG/100ML; MG/100ML; MG/100ML; MG/100ML
100 INJECTION, SOLUTION INTRAVENOUS CONTINUOUS
Status: DISCONTINUED | OUTPATIENT
Start: 2018-02-23 | End: 2018-02-23 | Stop reason: SDUPTHER

## 2018-02-23 RX ORDER — METOCLOPRAMIDE HYDROCHLORIDE 5 MG/ML
10 INJECTION INTRAMUSCULAR; INTRAVENOUS EVERY 6 HOURS
Status: DISCONTINUED | OUTPATIENT
Start: 2018-02-23 | End: 2018-02-23 | Stop reason: SDUPTHER

## 2018-02-23 RX ORDER — CEFAZOLIN SODIUM 2 G/100ML
2 INJECTION, SOLUTION INTRAVENOUS ONCE
Status: COMPLETED | OUTPATIENT
Start: 2018-02-23 | End: 2018-02-23

## 2018-02-23 RX ORDER — METOPROLOL SUCCINATE 50 MG/1
50 TABLET, EXTENDED RELEASE ORAL DAILY
Status: DISCONTINUED | OUTPATIENT
Start: 2018-02-24 | End: 2018-02-25 | Stop reason: HOSPADM

## 2018-02-23 RX ORDER — SIMETHICONE 80 MG
80 TABLET,CHEWABLE ORAL 4 TIMES DAILY PRN
Status: DISCONTINUED | OUTPATIENT
Start: 2018-02-23 | End: 2018-02-25 | Stop reason: HOSPADM

## 2018-02-23 RX ORDER — MONTELUKAST SODIUM 10 MG/1
10 TABLET ORAL DAILY
Status: DISCONTINUED | OUTPATIENT
Start: 2018-02-24 | End: 2018-02-25 | Stop reason: HOSPADM

## 2018-02-23 RX ORDER — ATRACURIUM BESYLATE 10 MG/ML
INJECTION, SOLUTION INTRAVENOUS AS NEEDED
Status: DISCONTINUED | OUTPATIENT
Start: 2018-02-23 | End: 2018-02-23 | Stop reason: SURG

## 2018-02-23 RX ORDER — LORAZEPAM 2 MG/ML
0.5 INJECTION INTRAMUSCULAR EVERY 12 HOURS PRN
Status: DISCONTINUED | OUTPATIENT
Start: 2018-02-23 | End: 2018-02-23 | Stop reason: SDUPTHER

## 2018-02-23 RX ORDER — NEOSTIGMINE METHYLSULFATE 1 MG/ML
INJECTION, SOLUTION INTRAVENOUS AS NEEDED
Status: DISCONTINUED | OUTPATIENT
Start: 2018-02-23 | End: 2018-02-23 | Stop reason: SURG

## 2018-02-23 RX ORDER — PROMETHAZINE HYDROCHLORIDE 25 MG/1
25 SUPPOSITORY RECTAL ONCE AS NEEDED
Status: COMPLETED | OUTPATIENT
Start: 2018-02-23 | End: 2018-02-23

## 2018-02-23 RX ORDER — LORAZEPAM 1 MG/1
1 TABLET ORAL EVERY 12 HOURS PRN
Status: DISCONTINUED | OUTPATIENT
Start: 2018-02-23 | End: 2018-02-25 | Stop reason: HOSPADM

## 2018-02-23 RX ORDER — SODIUM CHLORIDE, SODIUM LACTATE, POTASSIUM CHLORIDE, CALCIUM CHLORIDE 600; 310; 30; 20 MG/100ML; MG/100ML; MG/100ML; MG/100ML
9 INJECTION, SOLUTION INTRAVENOUS CONTINUOUS
Status: DISCONTINUED | OUTPATIENT
Start: 2018-02-23 | End: 2018-02-23

## 2018-02-23 RX ORDER — LABETALOL HYDROCHLORIDE 5 MG/ML
10 INJECTION, SOLUTION INTRAVENOUS
Status: DISCONTINUED | OUTPATIENT
Start: 2018-02-23 | End: 2018-02-25 | Stop reason: HOSPADM

## 2018-02-23 RX ORDER — SODIUM CHLORIDE AND POTASSIUM CHLORIDE 150; 450 MG/100ML; MG/100ML
125 INJECTION, SOLUTION INTRAVENOUS CONTINUOUS
Status: DISCONTINUED | OUTPATIENT
Start: 2018-02-24 | End: 2018-02-25 | Stop reason: HOSPADM

## 2018-02-23 RX ORDER — DIPHENHYDRAMINE HYDROCHLORIDE 50 MG/ML
25 INJECTION INTRAMUSCULAR; INTRAVENOUS EVERY 4 HOURS PRN
Status: DISCONTINUED | OUTPATIENT
Start: 2018-02-23 | End: 2018-02-25 | Stop reason: HOSPADM

## 2018-02-23 RX ORDER — DEXAMETHASONE SODIUM PHOSPHATE 4 MG/ML
INJECTION, SOLUTION INTRA-ARTICULAR; INTRALESIONAL; INTRAMUSCULAR; INTRAVENOUS; SOFT TISSUE AS NEEDED
Status: DISCONTINUED | OUTPATIENT
Start: 2018-02-23 | End: 2018-02-23 | Stop reason: SURG

## 2018-02-23 RX ORDER — CEFAZOLIN SODIUM 2 G/100ML
2 INJECTION, SOLUTION INTRAVENOUS EVERY 8 HOURS
Status: COMPLETED | OUTPATIENT
Start: 2018-02-23 | End: 2018-02-24

## 2018-02-23 RX ADMIN — PANTOPRAZOLE SODIUM 40 MG: 40 INJECTION, POWDER, FOR SOLUTION INTRAVENOUS at 11:14

## 2018-02-23 RX ADMIN — FENTANYL CITRATE: 50 INJECTION INTRAMUSCULAR; INTRAVENOUS at 15:30

## 2018-02-23 RX ADMIN — PROPOFOL 200 MG: 10 INJECTION, EMULSION INTRAVENOUS at 12:56

## 2018-02-23 RX ADMIN — DEXAMETHASONE SODIUM PHOSPHATE 8 MG: 4 INJECTION, SOLUTION INTRAMUSCULAR; INTRAVENOUS at 13:10

## 2018-02-23 RX ADMIN — CEFAZOLIN SODIUM 2 G: 2 INJECTION, SOLUTION INTRAVENOUS at 13:04

## 2018-02-23 RX ADMIN — SODIUM CHLORIDE, POTASSIUM CHLORIDE, SODIUM LACTATE AND CALCIUM CHLORIDE 1000 ML: 600; 310; 30; 20 INJECTION, SOLUTION INTRAVENOUS at 11:10

## 2018-02-23 RX ADMIN — SCOPALAMINE 1 PATCH: 1 PATCH, EXTENDED RELEASE TRANSDERMAL at 11:20

## 2018-02-23 RX ADMIN — ALBUTEROL SULFATE 2.5 MG: 2.5 SOLUTION RESPIRATORY (INHALATION) at 19:58

## 2018-02-23 RX ADMIN — METOCLOPRAMIDE 10 MG: 5 INJECTION, SOLUTION INTRAMUSCULAR; INTRAVENOUS at 21:08

## 2018-02-23 RX ADMIN — HYDROCODONE BITARTRATE AND ACETAMINOPHEN 1 TABLET: 7.5; 325 TABLET ORAL at 21:08

## 2018-02-23 RX ADMIN — GLYCOPYRROLATE 0.4 MG: 0.2 INJECTION, SOLUTION INTRAMUSCULAR; INTRAVENOUS at 14:57

## 2018-02-23 RX ADMIN — BUPRENORPHINE HYDROCHLORIDE 0.3 MCG: 0.32 INJECTION INTRAMUSCULAR; INTRAVENOUS at 13:10

## 2018-02-23 RX ADMIN — ACETAMINOPHEN 650 MG: 325 TABLET, FILM COATED ORAL at 11:28

## 2018-02-23 RX ADMIN — CHLORHEXIDINE GLUCONATE 15 ML: 1.2 RINSE ORAL at 12:50

## 2018-02-23 RX ADMIN — LIDOCAINE HYDROCHLORIDE 0.5 ML: 10 INJECTION, SOLUTION EPIDURAL; INFILTRATION; INTRACAUDAL; PERINEURAL at 11:10

## 2018-02-23 RX ADMIN — SODIUM CHLORIDE, POTASSIUM CHLORIDE, SODIUM LACTATE AND CALCIUM CHLORIDE 100 ML/HR: 600; 310; 30; 20 INJECTION, SOLUTION INTRAVENOUS at 12:10

## 2018-02-23 RX ADMIN — METOCLOPRAMIDE 10 MG: 5 INJECTION, SOLUTION INTRAMUSCULAR; INTRAVENOUS at 23:46

## 2018-02-23 RX ADMIN — ONDANSETRON 4 MG: 2 INJECTION INTRAMUSCULAR; INTRAVENOUS at 14:44

## 2018-02-23 RX ADMIN — SIMETHICONE 80 MG: 80 TABLET, CHEWABLE ORAL at 15:59

## 2018-02-23 RX ADMIN — NEOSTIGMINE METHYLSULFATE 3 MG: 1 INJECTION, SOLUTION INTRAVENOUS at 14:57

## 2018-02-23 RX ADMIN — CEFAZOLIN SODIUM 2 G: 2 INJECTION, SOLUTION INTRAVENOUS at 21:08

## 2018-02-23 RX ADMIN — PROMETHAZINE HYDROCHLORIDE: 25 INJECTION INTRAMUSCULAR; INTRAVENOUS at 15:45

## 2018-02-23 RX ADMIN — LOSARTAN POTASSIUM 50 MG: 50 TABLET ORAL at 21:08

## 2018-02-23 RX ADMIN — EPHEDRINE SULFATE 10 MG: 50 INJECTION INTRAMUSCULAR; INTRAVENOUS; SUBCUTANEOUS at 13:25

## 2018-02-23 RX ADMIN — PROPOFOL 25 MCG/KG/MIN: 10 INJECTION, EMULSION INTRAVENOUS at 13:26

## 2018-02-23 RX ADMIN — DEXAMETHASONE SODIUM PHOSPHATE 61.4 ML: 10 INJECTION, SOLUTION INTRAMUSCULAR; INTRAVENOUS at 12:57

## 2018-02-23 RX ADMIN — ATRACURIUM BESYLATE 50 MG: 10 INJECTION, SOLUTION INTRAVENOUS at 12:56

## 2018-02-23 RX ADMIN — SODIUM CHLORIDE, POTASSIUM CHLORIDE, SODIUM LACTATE AND CALCIUM CHLORIDE: 600; 310; 30; 20 INJECTION, SOLUTION INTRAVENOUS at 13:40

## 2018-02-23 RX ADMIN — BUPIVACAINE HYDROCHLORIDE 60 ML: 2.5 INJECTION, SOLUTION EPIDURAL; INFILTRATION; INTRACAUDAL; PERINEURAL at 13:10

## 2018-02-23 RX ADMIN — SODIUM CHLORIDE, POTASSIUM CHLORIDE, SODIUM LACTATE AND CALCIUM CHLORIDE 150 ML/HR: 600; 310; 30; 20 INJECTION, SOLUTION INTRAVENOUS at 21:09

## 2018-02-23 RX ADMIN — FENTANYL CITRATE 100 MCG: 50 INJECTION, SOLUTION INTRAMUSCULAR; INTRAVENOUS at 12:56

## 2018-02-23 NOTE — ANESTHESIA PROCEDURE NOTES
Peripheral Block    Patient location during procedure: OR  Start time: 2/23/2018 1:00 PM  Stop time: 2/23/2018 1:11 PM  Reason for block: at surgeon's request and post-op pain management  Performed by  Anesthesiologist: TORY RENEE  CRNA: ALTAGRACIA EARLY  Preanesthetic Checklist  Completed: patient identified, site marked, surgical consent, pre-op evaluation, timeout performed, IV checked, risks and benefits discussed and monitors and equipment checked  Prep:  Pt Position: supine  Sterile barriers:cap, gloves, sterile barriers and mask  Prep: ChloraPrep  Patient monitoring: blood pressure monitoring, continuous pulse oximetry and EKG  Procedure  Sedation:yes  Performed under: general  Guidance:ultrasound guided  Images:still images obtained    Laterality:Bilateral  Block Type:TAP  Injection Technique:single-shot  Needle Type:short-bevel and echogenic  Needle Gauge:20 G    Medications  Comment:Block Injection:  LA dose divided between Right and Left block       Adjuncts:  Decadron 4mg PSF, Buprenex 0.3mg (Per total volume of LA)  Local Injected:bupivacaine 0.25% Local Amount Injected:60mL  Post Assessment  Injection Assessment: negative aspiration for heme, incremental injection and no paresthesia on injection  Patient Tolerance:comfortable throughout block  Complications:no  Additional Notes      Under Ultrasound guidance, a BBraun 4inch 360 degree needle was advanced with Normal Saline hydro dissection of tissue.  The Internal Oblique and Transversus Abdominus muscles where visualized.  At or before the aponeurosis of Internal Oblique, local anesthetic spread was visualized in the Transversus Abdominus Plane. Injection was made incrementally with aspiration every 5 mls.  There was no  intravascular injection,  injection pressure was normal, there was no neural injection, and the procedure was completed without difficulty.  Thank You.

## 2018-02-23 NOTE — ANESTHESIA PREPROCEDURE EVALUATION
Anesthesia Evaluation     Patient summary reviewed and Nursing notes reviewed   history of anesthetic complications: PONV               Airway   Mallampati: I  TM distance: >3 FB  Neck ROM: full  no difficulty expected  Dental      Pulmonary    (+) asthma, sleep apnea,   Cardiovascular     ECG reviewed    (+) hypertension, valvular problems/murmurs, hyperlipidemia      Neuro/Psych- negative ROS  GI/Hepatic/Renal/Endo    (+) obesity, morbid obesity, GERD,     Musculoskeletal (-) negative ROS    Abdominal    Substance History - negative use     OB/GYN negative ob/gyn ROS         Other                      Anesthesia Plan    ASA 3     general   (Tap)  intravenous induction   Anesthetic plan and risks discussed with patient.    Plan discussed with CRNA.

## 2018-02-23 NOTE — ANESTHESIA POSTPROCEDURE EVALUATION
Patient: Shaun Morales    Procedure Summary     Date Anesthesia Start Anesthesia Stop Room / Location    02/23/18 1250  [facility] / [facility]       Procedure Diagnosis Surgeon Provider    GASTRIC SLEEVE LAPAROSCOPIC (N/A Abdomen); PARAESOPHAGEAL HERNIA REPAIR LAPAROSCOPIC (N/A Abdomen); ESOPHAGOGASTRODUODENOSCOPY (N/A Esophagus) Morbid obesity with body mass index (BMI) of 50.0 to 59.9 in adult; Paraesophageal hiatal hernia  (Morbid obesity with body mass index (BMI) of 50.0 to 59.9 in adult [E66.01, Z68.43]) MD Spike Moore MD          Anesthesia Type: general  Last vitals  BP   133/79   Temp   97.0   Pulse   66   Resp   16     SpO2   94%     Post Anesthesia Care and Evaluation    Patient location during evaluation: PACU  Patient participation: complete - patient participated  Level of consciousness: awake  Pain score: 0  Pain management: adequate  Airway patency: patent  Anesthetic complications: No anesthetic complications  PONV Status: none  Cardiovascular status: acceptable and stable  Respiratory status: nasal cannula, unassisted and acceptable  Hydration status: acceptable

## 2018-02-23 NOTE — ANESTHESIA PROCEDURE NOTES
Airway  Urgency: elective    Airway not difficult    General Information and Staff    Patient location during procedure: OR  CRNA: ALIA MACHADO    Indications and Patient Condition  Indications for airway management: airway protection    Preoxygenated: yes  MILS not maintained throughout  Mask difficulty assessment: 2 - vent by mask + OA or adjuvant +/- NMBA    Final Airway Details  Final airway type: endotracheal airway      Successful airway: ETT  Cuffed: yes   Successful intubation technique: direct laryngoscopy  Endotracheal tube insertion site: oral  Blade: Nieto  Blade size: #2  ETT size: 7.0 mm  Cormack-Lehane Classification: grade I - full view of glottis  Placement verified by: chest auscultation and capnometry   Cuff volume (mL): 5  Measured from: lips  ETT to lips (cm): 21  Number of attempts at approach: 1    Additional Comments  Negative epigastric sounds, Breath sound equal bilaterally with symmetric chest rise and fall

## 2018-02-24 ENCOUNTER — APPOINTMENT (OUTPATIENT)
Dept: GENERAL RADIOLOGY | Facility: HOSPITAL | Age: 48
End: 2018-02-24

## 2018-02-24 LAB
ALBUMIN SERPL-MCNC: 4 G/DL (ref 3.2–4.8)
ALBUMIN/GLOB SERPL: 1.4 G/DL (ref 1.5–2.5)
ALP SERPL-CCNC: 74 U/L (ref 25–100)
ALT SERPL W P-5'-P-CCNC: 58 U/L (ref 7–40)
ANION GAP SERPL CALCULATED.3IONS-SCNC: 8 MMOL/L (ref 3–11)
AST SERPL-CCNC: 37 U/L (ref 0–33)
BASOPHILS # BLD AUTO: 0 10*3/MM3 (ref 0–0.2)
BASOPHILS NFR BLD AUTO: 0 % (ref 0–1)
BILIRUB SERPL-MCNC: 0.4 MG/DL (ref 0.3–1.2)
BUN BLD-MCNC: 7 MG/DL (ref 9–23)
BUN/CREAT SERPL: 14 (ref 7–25)
CALCIUM SPEC-SCNC: 9 MG/DL (ref 8.7–10.4)
CHLORIDE SERPL-SCNC: 103 MMOL/L (ref 99–109)
CO2 SERPL-SCNC: 27 MMOL/L (ref 20–31)
CREAT BLD-MCNC: 0.5 MG/DL (ref 0.6–1.3)
DEPRECATED RDW RBC AUTO: 44 FL (ref 37–54)
EOSINOPHIL # BLD AUTO: 0 10*3/MM3 (ref 0–0.3)
EOSINOPHIL NFR BLD AUTO: 0 % (ref 0–3)
ERYTHROCYTE [DISTWIDTH] IN BLOOD BY AUTOMATED COUNT: 13.3 % (ref 11.3–14.5)
GFR SERPL CREATININE-BSD FRML MDRD: 133 ML/MIN/1.73
GLOBULIN UR ELPH-MCNC: 2.8 GM/DL
GLUCOSE BLD-MCNC: 151 MG/DL (ref 70–100)
HCT VFR BLD AUTO: 39.8 % (ref 34.5–44)
HGB BLD-MCNC: 13.1 G/DL (ref 11.5–15.5)
IMM GRANULOCYTES # BLD: 0.04 10*3/MM3 (ref 0–0.03)
IMM GRANULOCYTES NFR BLD: 0.3 % (ref 0–0.6)
IRON 24H UR-MRATE: 27 MCG/DL (ref 50–175)
LYMPHOCYTES # BLD AUTO: 1.43 10*3/MM3 (ref 0.6–4.8)
LYMPHOCYTES NFR BLD AUTO: 11.3 % (ref 24–44)
MCH RBC QN AUTO: 29.9 PG (ref 27–31)
MCHC RBC AUTO-ENTMCNC: 32.9 G/DL (ref 32–36)
MCV RBC AUTO: 90.9 FL (ref 80–99)
MONOCYTES # BLD AUTO: 0.99 10*3/MM3 (ref 0–1)
MONOCYTES NFR BLD AUTO: 7.8 % (ref 0–12)
NEUTROPHILS # BLD AUTO: 10.17 10*3/MM3 (ref 1.5–8.3)
NEUTROPHILS NFR BLD AUTO: 80.6 % (ref 41–71)
PLATELET # BLD AUTO: 380 10*3/MM3 (ref 150–450)
PMV BLD AUTO: 11.4 FL (ref 6–12)
POTASSIUM BLD-SCNC: 4.4 MMOL/L (ref 3.5–5.5)
PROT SERPL-MCNC: 6.8 G/DL (ref 5.7–8.2)
RBC # BLD AUTO: 4.38 10*6/MM3 (ref 3.89–5.14)
SODIUM BLD-SCNC: 138 MMOL/L (ref 132–146)
WBC NRBC COR # BLD: 12.63 10*3/MM3 (ref 3.5–10.8)

## 2018-02-24 PROCEDURE — 94640 AIRWAY INHALATION TREATMENT: CPT

## 2018-02-24 PROCEDURE — 85025 COMPLETE CBC W/AUTO DIFF WBC: CPT | Performed by: SURGERY

## 2018-02-24 PROCEDURE — 94799 UNLISTED PULMONARY SVC/PX: CPT

## 2018-02-24 PROCEDURE — 94760 N-INVAS EAR/PLS OXIMETRY 1: CPT

## 2018-02-24 PROCEDURE — 25010000002 CYANOCOBALAMIN PER 1000 MCG: Performed by: SURGERY

## 2018-02-24 PROCEDURE — 25810000003 POTASSIUM CHLORIDE PER 2 MEQ: Performed by: SURGERY

## 2018-02-24 PROCEDURE — 99024 POSTOP FOLLOW-UP VISIT: CPT | Performed by: SURGERY

## 2018-02-24 PROCEDURE — 0 DIATRIZOATE MEGLUMINE & SODIUM PER 1 ML: Performed by: SURGERY

## 2018-02-24 PROCEDURE — 25010000002 PYRIDOXINE PER 100 MG: Performed by: SURGERY

## 2018-02-24 PROCEDURE — 25010000002 METOCLOPRAMIDE PER 10 MG: Performed by: SURGERY

## 2018-02-24 PROCEDURE — 25010000002 ONDANSETRON PER 1 MG: Performed by: SURGERY

## 2018-02-24 PROCEDURE — 25010000002 NA FERRIC GLUC CPLX PER 12.5 MG: Performed by: SURGERY

## 2018-02-24 PROCEDURE — 25010000002 THIAMINE PER 100 MG: Performed by: SURGERY

## 2018-02-24 PROCEDURE — 94660 CPAP INITIATION&MGMT: CPT

## 2018-02-24 PROCEDURE — 25010000002 ENOXAPARIN PER 10 MG: Performed by: SURGERY

## 2018-02-24 PROCEDURE — 83540 ASSAY OF IRON: CPT | Performed by: SURGERY

## 2018-02-24 PROCEDURE — 80053 COMPREHEN METABOLIC PANEL: CPT | Performed by: SURGERY

## 2018-02-24 PROCEDURE — 74241: CPT

## 2018-02-24 PROCEDURE — 25010000003 CEFAZOLIN IN DEXTROSE 2-4 GM/100ML-% SOLUTION: Performed by: SURGERY

## 2018-02-24 RX ADMIN — HYDROCODONE BITARTRATE AND ACETAMINOPHEN 1 TABLET: 7.5; 325 TABLET ORAL at 17:17

## 2018-02-24 RX ADMIN — CYANOCOBALAMIN 1000 MCG: 1000 INJECTION, SOLUTION INTRAMUSCULAR; SUBCUTANEOUS at 09:18

## 2018-02-24 RX ADMIN — LOSARTAN POTASSIUM 50 MG: 50 TABLET ORAL at 09:18

## 2018-02-24 RX ADMIN — HYDROCODONE BITARTRATE AND ACETAMINOPHEN 1 TABLET: 7.5; 325 TABLET ORAL at 21:21

## 2018-02-24 RX ADMIN — METOCLOPRAMIDE 10 MG: 5 INJECTION, SOLUTION INTRAMUSCULAR; INTRAVENOUS at 17:17

## 2018-02-24 RX ADMIN — ONDANSETRON 4 MG: 2 INJECTION INTRAMUSCULAR; INTRAVENOUS at 11:56

## 2018-02-24 RX ADMIN — ALBUTEROL SULFATE 2.5 MG: 2.5 SOLUTION RESPIRATORY (INHALATION) at 18:38

## 2018-02-24 RX ADMIN — POTASSIUM CHLORIDE AND SODIUM CHLORIDE 125 ML/HR: 450; 150 INJECTION, SOLUTION INTRAVENOUS at 14:00

## 2018-02-24 RX ADMIN — PANTOPRAZOLE SODIUM 40 MG: 40 INJECTION, POWDER, FOR SOLUTION INTRAVENOUS at 05:35

## 2018-02-24 RX ADMIN — Medication 30 ML: at 11:39

## 2018-02-24 RX ADMIN — METOPROLOL SUCCINATE 50 MG: 50 TABLET, EXTENDED RELEASE ORAL at 09:18

## 2018-02-24 RX ADMIN — ALBUTEROL SULFATE 2.5 MG: 2.5 SOLUTION RESPIRATORY (INHALATION) at 07:21

## 2018-02-24 RX ADMIN — METOCLOPRAMIDE 10 MG: 5 INJECTION, SOLUTION INTRAMUSCULAR; INTRAVENOUS at 05:35

## 2018-02-24 RX ADMIN — SODIUM FERRIC GLUCONATE COMPLEX 125 MG: 12.5 INJECTION INTRAVENOUS at 13:54

## 2018-02-24 RX ADMIN — CETIRIZINE HYDROCHLORIDE 10 MG: 10 TABLET, FILM COATED ORAL at 09:18

## 2018-02-24 RX ADMIN — LOSARTAN POTASSIUM 50 MG: 50 TABLET ORAL at 21:15

## 2018-02-24 RX ADMIN — METOCLOPRAMIDE 10 MG: 5 INJECTION, SOLUTION INTRAMUSCULAR; INTRAVENOUS at 21:16

## 2018-02-24 RX ADMIN — SIMETHICONE 80 MG: 80 TABLET, CHEWABLE ORAL at 12:05

## 2018-02-24 RX ADMIN — FOLIC ACID 250 ML/HR: 5 INJECTION, SOLUTION INTRAMUSCULAR; INTRAVENOUS; SUBCUTANEOUS at 07:49

## 2018-02-24 RX ADMIN — MONTELUKAST SODIUM 10 MG: 10 TABLET, FILM COATED ORAL at 09:18

## 2018-02-24 RX ADMIN — HYDROCODONE BITARTRATE AND ACETAMINOPHEN 1 TABLET: 7.5; 325 TABLET ORAL at 12:05

## 2018-02-24 RX ADMIN — METOCLOPRAMIDE 10 MG: 5 INJECTION, SOLUTION INTRAMUSCULAR; INTRAVENOUS at 11:56

## 2018-02-24 RX ADMIN — HYDROCODONE BITARTRATE AND ACETAMINOPHEN 1 TABLET: 7.5; 325 TABLET ORAL at 01:23

## 2018-02-24 RX ADMIN — ENOXAPARIN SODIUM 40 MG: 40 INJECTION SUBCUTANEOUS at 11:56

## 2018-02-24 RX ADMIN — LORAZEPAM 1 MG: 1 TABLET ORAL at 21:28

## 2018-02-24 RX ADMIN — ENOXAPARIN SODIUM 40 MG: 40 INJECTION SUBCUTANEOUS at 21:15

## 2018-02-24 RX ADMIN — ALBUTEROL SULFATE 2.5 MG: 2.5 SOLUTION RESPIRATORY (INHALATION) at 12:13

## 2018-02-24 RX ADMIN — HYDROCODONE BITARTRATE AND ACETAMINOPHEN 1 TABLET: 7.5; 325 TABLET ORAL at 05:35

## 2018-02-24 RX ADMIN — CEFAZOLIN SODIUM 2 G: 2 INJECTION, SOLUTION INTRAVENOUS at 05:35

## 2018-02-25 VITALS
TEMPERATURE: 98 F | RESPIRATION RATE: 16 BRPM | DIASTOLIC BLOOD PRESSURE: 89 MMHG | SYSTOLIC BLOOD PRESSURE: 151 MMHG | WEIGHT: 279.98 LBS | HEART RATE: 83 BPM | HEIGHT: 62 IN | OXYGEN SATURATION: 93 % | BODY MASS INDEX: 51.52 KG/M2

## 2018-02-25 LAB
ALBUMIN SERPL-MCNC: 3.8 G/DL (ref 3.2–4.8)
ALBUMIN/GLOB SERPL: 1.5 G/DL (ref 1.5–2.5)
ALP SERPL-CCNC: 66 U/L (ref 25–100)
ALT SERPL W P-5'-P-CCNC: 42 U/L (ref 7–40)
ANION GAP SERPL CALCULATED.3IONS-SCNC: 7 MMOL/L (ref 3–11)
AST SERPL-CCNC: 22 U/L (ref 0–33)
BASOPHILS # BLD AUTO: 0.02 10*3/MM3 (ref 0–0.2)
BASOPHILS NFR BLD AUTO: 0.2 % (ref 0–1)
BILIRUB SERPL-MCNC: 0.4 MG/DL (ref 0.3–1.2)
BUN BLD-MCNC: 7 MG/DL (ref 9–23)
BUN/CREAT SERPL: 14 (ref 7–25)
CALCIUM SPEC-SCNC: 8.6 MG/DL (ref 8.7–10.4)
CHLORIDE SERPL-SCNC: 105 MMOL/L (ref 99–109)
CO2 SERPL-SCNC: 26 MMOL/L (ref 20–31)
CREAT BLD-MCNC: 0.5 MG/DL (ref 0.6–1.3)
DEPRECATED RDW RBC AUTO: 47.1 FL (ref 37–54)
EOSINOPHIL # BLD AUTO: 0.24 10*3/MM3 (ref 0–0.3)
EOSINOPHIL NFR BLD AUTO: 2.1 % (ref 0–3)
ERYTHROCYTE [DISTWIDTH] IN BLOOD BY AUTOMATED COUNT: 13.7 % (ref 11.3–14.5)
GFR SERPL CREATININE-BSD FRML MDRD: 133 ML/MIN/1.73
GLOBULIN UR ELPH-MCNC: 2.6 GM/DL
GLUCOSE BLD-MCNC: 118 MG/DL (ref 70–100)
HCT VFR BLD AUTO: 38.4 % (ref 34.5–44)
HGB BLD-MCNC: 12.4 G/DL (ref 11.5–15.5)
IMM GRANULOCYTES # BLD: 0.02 10*3/MM3 (ref 0–0.03)
IMM GRANULOCYTES NFR BLD: 0.2 % (ref 0–0.6)
LYMPHOCYTES # BLD AUTO: 3.39 10*3/MM3 (ref 0.6–4.8)
LYMPHOCYTES NFR BLD AUTO: 29.1 % (ref 24–44)
MCH RBC QN AUTO: 30.1 PG (ref 27–31)
MCHC RBC AUTO-ENTMCNC: 32.3 G/DL (ref 32–36)
MCV RBC AUTO: 93.2 FL (ref 80–99)
MONOCYTES # BLD AUTO: 1.01 10*3/MM3 (ref 0–1)
MONOCYTES NFR BLD AUTO: 8.7 % (ref 0–12)
NEUTROPHILS # BLD AUTO: 6.96 10*3/MM3 (ref 1.5–8.3)
NEUTROPHILS NFR BLD AUTO: 59.7 % (ref 41–71)
PLATELET # BLD AUTO: 306 10*3/MM3 (ref 150–450)
PMV BLD AUTO: 11.7 FL (ref 6–12)
POTASSIUM BLD-SCNC: 4.3 MMOL/L (ref 3.5–5.5)
PROT SERPL-MCNC: 6.4 G/DL (ref 5.7–8.2)
RBC # BLD AUTO: 4.12 10*6/MM3 (ref 3.89–5.14)
SODIUM BLD-SCNC: 138 MMOL/L (ref 132–146)
WBC NRBC COR # BLD: 11.64 10*3/MM3 (ref 3.5–10.8)

## 2018-02-25 PROCEDURE — 25010000002 ENOXAPARIN PER 10 MG: Performed by: SURGERY

## 2018-02-25 PROCEDURE — 85025 COMPLETE CBC W/AUTO DIFF WBC: CPT | Performed by: SURGERY

## 2018-02-25 PROCEDURE — 94799 UNLISTED PULMONARY SVC/PX: CPT

## 2018-02-25 PROCEDURE — 99024 POSTOP FOLLOW-UP VISIT: CPT | Performed by: SURGERY

## 2018-02-25 PROCEDURE — 80053 COMPREHEN METABOLIC PANEL: CPT | Performed by: SURGERY

## 2018-02-25 RX ORDER — ONDANSETRON 4 MG/1
4 TABLET, ORALLY DISINTEGRATING ORAL EVERY 8 HOURS PRN
Qty: 20 TABLET | Refills: 0 | Status: SHIPPED | OUTPATIENT
Start: 2018-02-25 | End: 2018-08-29

## 2018-02-25 RX ORDER — HYDROCODONE BITARTRATE AND ACETAMINOPHEN 7.5; 325 MG/1; MG/1
1 TABLET ORAL EVERY 6 HOURS PRN
Qty: 30 TABLET | Refills: 0 | Status: SHIPPED | OUTPATIENT
Start: 2018-02-25 | End: 2018-08-29

## 2018-02-25 RX ORDER — ALBUTEROL SULFATE 2.5 MG/3ML
2.5 SOLUTION RESPIRATORY (INHALATION) EVERY 6 HOURS PRN
Status: DISCONTINUED | OUTPATIENT
Start: 2018-02-25 | End: 2018-02-25 | Stop reason: HOSPADM

## 2018-02-25 RX ORDER — OMEPRAZOLE 40 MG/1
40 CAPSULE, DELAYED RELEASE ORAL DAILY
Qty: 60 CAPSULE | Refills: 0 | Status: SHIPPED | OUTPATIENT
Start: 2018-02-25 | End: 2018-05-29 | Stop reason: ALTCHOICE

## 2018-02-25 RX ORDER — PROMETHAZINE HYDROCHLORIDE 12.5 MG/1
12.5 TABLET ORAL EVERY 6 HOURS PRN
Qty: 20 TABLET | Refills: 0 | Status: SHIPPED | OUTPATIENT
Start: 2018-02-25 | End: 2018-08-29

## 2018-02-25 RX ADMIN — METOPROLOL SUCCINATE 50 MG: 50 TABLET, EXTENDED RELEASE ORAL at 09:50

## 2018-02-25 RX ADMIN — MONTELUKAST SODIUM 10 MG: 10 TABLET, FILM COATED ORAL at 09:50

## 2018-02-25 RX ADMIN — LOSARTAN POTASSIUM 50 MG: 50 TABLET ORAL at 09:50

## 2018-02-25 RX ADMIN — PANTOPRAZOLE SODIUM 40 MG: 40 INJECTION, POWDER, FOR SOLUTION INTRAVENOUS at 06:27

## 2018-02-25 RX ADMIN — ENOXAPARIN SODIUM 40 MG: 40 INJECTION SUBCUTANEOUS at 09:50

## 2018-02-25 RX ADMIN — CETIRIZINE HYDROCHLORIDE 10 MG: 10 TABLET, FILM COATED ORAL at 09:50

## 2018-02-26 LAB
CYTO UR: NORMAL
LAB AP CASE REPORT: NORMAL
LAB AP CLINICAL INFORMATION: NORMAL
Lab: NORMAL
PATH REPORT.FINAL DX SPEC: NORMAL
PATH REPORT.GROSS SPEC: NORMAL

## 2018-02-28 ENCOUNTER — APPOINTMENT (OUTPATIENT)
Dept: GENERAL RADIOLOGY | Facility: HOSPITAL | Age: 48
End: 2018-02-28

## 2018-02-28 ENCOUNTER — HOSPITAL ENCOUNTER (EMERGENCY)
Facility: HOSPITAL | Age: 48
Discharge: HOME | End: 2018-02-28
Attending: EMERGENCY MEDICINE | Admitting: EMERGENCY MEDICINE

## 2018-02-28 ENCOUNTER — OFFICE VISIT (OUTPATIENT)
Dept: BARIATRICS/WEIGHT MGMT | Facility: CLINIC | Age: 48
End: 2018-02-28

## 2018-02-28 VITALS
HEIGHT: 62 IN | DIASTOLIC BLOOD PRESSURE: 80 MMHG | BODY MASS INDEX: 49.69 KG/M2 | RESPIRATION RATE: 18 BRPM | SYSTOLIC BLOOD PRESSURE: 142 MMHG | TEMPERATURE: 97 F | WEIGHT: 270 LBS | HEART RATE: 90 BPM | OXYGEN SATURATION: 98 %

## 2018-02-28 VITALS
HEART RATE: 86 BPM | WEIGHT: 267 LBS | TEMPERATURE: 97.9 F | DIASTOLIC BLOOD PRESSURE: 96 MMHG | RESPIRATION RATE: 18 BRPM | SYSTOLIC BLOOD PRESSURE: 144 MMHG | OXYGEN SATURATION: 99 % | HEIGHT: 62 IN | BODY MASS INDEX: 49.13 KG/M2

## 2018-02-28 DIAGNOSIS — R73.03 PREDIABETES: ICD-10-CM

## 2018-02-28 DIAGNOSIS — I10 ESSENTIAL HYPERTENSION: ICD-10-CM

## 2018-02-28 DIAGNOSIS — E78.5 HYPERLIPIDEMIA, UNSPECIFIED HYPERLIPIDEMIA TYPE: ICD-10-CM

## 2018-02-28 DIAGNOSIS — R53.83 FATIGUE, UNSPECIFIED TYPE: Primary | ICD-10-CM

## 2018-02-28 DIAGNOSIS — G47.33 OBSTRUCTIVE SLEEP APNEA SYNDROME: ICD-10-CM

## 2018-02-28 DIAGNOSIS — M19.90 OSTEOARTHRITIS, UNSPECIFIED OSTEOARTHRITIS TYPE, UNSPECIFIED SITE: ICD-10-CM

## 2018-02-28 DIAGNOSIS — K21.9 GASTROESOPHAGEAL REFLUX DISEASE, ESOPHAGITIS PRESENCE NOT SPECIFIED: ICD-10-CM

## 2018-02-28 DIAGNOSIS — J45.909 ASTHMA, UNSPECIFIED ASTHMA SEVERITY, UNSPECIFIED WHETHER COMPLICATED, UNSPECIFIED WHETHER PERSISTENT: ICD-10-CM

## 2018-02-28 DIAGNOSIS — F41.8 ANXIETY ABOUT HEALTH: Primary | ICD-10-CM

## 2018-02-28 LAB
ALBUMIN SERPL-MCNC: 4.5 G/DL (ref 3.5–5)
ALBUMIN/GLOB SERPL: 1.4 G/DL (ref 1.5–2.5)
ALP SERPL-CCNC: 81 U/L (ref 35–104)
ALT SERPL W P-5'-P-CCNC: 31 U/L (ref 10–36)
ANION GAP SERPL CALCULATED.3IONS-SCNC: 11 MMOL/L (ref 3.6–11.2)
AST SERPL-CCNC: 19 U/L (ref 10–30)
BASOPHILS # BLD AUTO: 0.05 10*3/MM3 (ref 0–0.3)
BASOPHILS NFR BLD AUTO: 0.4 % (ref 0–2)
BILIRUB SERPL-MCNC: 0.5 MG/DL (ref 0.2–1.8)
BUN BLD-MCNC: 11 MG/DL (ref 7–21)
BUN/CREAT SERPL: 20.4 (ref 7–25)
CALCIUM SPEC-SCNC: 9 MG/DL (ref 7.7–10)
CHLORIDE SERPL-SCNC: 104 MMOL/L (ref 99–112)
CO2 SERPL-SCNC: 22 MMOL/L (ref 24.3–31.9)
CREAT BLD-MCNC: 0.54 MG/DL (ref 0.43–1.29)
DEPRECATED RDW RBC AUTO: 41.6 FL (ref 37–54)
EOSINOPHIL # BLD AUTO: 0.66 10*3/MM3 (ref 0–0.7)
EOSINOPHIL NFR BLD AUTO: 5.9 % (ref 0–5)
ERYTHROCYTE [DISTWIDTH] IN BLOOD BY AUTOMATED COUNT: 12.8 % (ref 11.5–14.5)
GFR SERPL CREATININE-BSD FRML MDRD: 122 ML/MIN/1.73
GLOBULIN UR ELPH-MCNC: 3.2 GM/DL
GLUCOSE BLD-MCNC: 141 MG/DL (ref 70–110)
HCT VFR BLD AUTO: 42.8 % (ref 37–47)
HGB BLD-MCNC: 14.1 G/DL (ref 12–16)
IMM GRANULOCYTES # BLD: 0.03 10*3/MM3 (ref 0–0.03)
IMM GRANULOCYTES NFR BLD: 0.3 % (ref 0–0.5)
LIPASE SERPL-CCNC: 47 U/L (ref 13–60)
LYMPHOCYTES # BLD AUTO: 2.42 10*3/MM3 (ref 1–3)
LYMPHOCYTES NFR BLD AUTO: 21.6 % (ref 21–51)
MCH RBC QN AUTO: 29.6 PG (ref 27–33)
MCHC RBC AUTO-ENTMCNC: 32.9 G/DL (ref 33–37)
MCV RBC AUTO: 89.9 FL (ref 80–94)
MONOCYTES # BLD AUTO: 0.92 10*3/MM3 (ref 0.1–0.9)
MONOCYTES NFR BLD AUTO: 8.2 % (ref 0–10)
NEUTROPHILS # BLD AUTO: 7.14 10*3/MM3 (ref 1.4–6.5)
NEUTROPHILS NFR BLD AUTO: 63.6 % (ref 30–70)
OSMOLALITY SERPL CALC.SUM OF ELEC: 275.6 MOSM/KG (ref 273–305)
PLATELET # BLD AUTO: 364 10*3/MM3 (ref 130–400)
PMV BLD AUTO: 11.1 FL (ref 6–10)
POTASSIUM BLD-SCNC: 3.8 MMOL/L (ref 3.5–5.3)
PROT SERPL-MCNC: 7.7 G/DL (ref 6–8)
RBC # BLD AUTO: 4.76 10*6/MM3 (ref 4.2–5.4)
SODIUM BLD-SCNC: 137 MMOL/L (ref 135–153)
TROPONIN I SERPL-MCNC: <0.006 NG/ML
WBC NRBC COR # BLD: 11.22 10*3/MM3 (ref 4.5–12.5)

## 2018-02-28 PROCEDURE — 71045 X-RAY EXAM CHEST 1 VIEW: CPT | Performed by: RADIOLOGY

## 2018-02-28 PROCEDURE — 96360 HYDRATION IV INFUSION INIT: CPT

## 2018-02-28 PROCEDURE — 85025 COMPLETE CBC W/AUTO DIFF WBC: CPT | Performed by: EMERGENCY MEDICINE

## 2018-02-28 PROCEDURE — 80053 COMPREHEN METABOLIC PANEL: CPT | Performed by: EMERGENCY MEDICINE

## 2018-02-28 PROCEDURE — 99284 EMERGENCY DEPT VISIT MOD MDM: CPT

## 2018-02-28 PROCEDURE — 71045 X-RAY EXAM CHEST 1 VIEW: CPT

## 2018-02-28 PROCEDURE — 99024 POSTOP FOLLOW-UP VISIT: CPT | Performed by: SURGERY

## 2018-02-28 PROCEDURE — 84484 ASSAY OF TROPONIN QUANT: CPT | Performed by: EMERGENCY MEDICINE

## 2018-02-28 PROCEDURE — 36415 COLL VENOUS BLD VENIPUNCTURE: CPT

## 2018-02-28 PROCEDURE — 83690 ASSAY OF LIPASE: CPT | Performed by: EMERGENCY MEDICINE

## 2018-02-28 PROCEDURE — 93005 ELECTROCARDIOGRAM TRACING: CPT | Performed by: EMERGENCY MEDICINE

## 2018-02-28 RX ORDER — SODIUM CHLORIDE 0.9 % (FLUSH) 0.9 %
10 SYRINGE (ML) INJECTION AS NEEDED
Status: DISCONTINUED | OUTPATIENT
Start: 2018-02-28 | End: 2018-02-28 | Stop reason: HOSPADM

## 2018-02-28 RX ORDER — URSODIOL 300 MG/1
300 CAPSULE ORAL 2 TIMES DAILY
Qty: 60 CAPSULE | Refills: 5 | Status: SHIPPED | OUTPATIENT
Start: 2018-02-28 | End: 2018-03-30

## 2018-02-28 RX ORDER — HYDROXYZINE PAMOATE 25 MG/1
25 CAPSULE ORAL 3 TIMES DAILY PRN
Qty: 30 CAPSULE | Refills: 0 | Status: SHIPPED | OUTPATIENT
Start: 2018-02-28 | End: 2018-03-21

## 2018-02-28 RX ORDER — LORAZEPAM 1 MG/1
1 TABLET ORAL ONCE
Status: COMPLETED | OUTPATIENT
Start: 2018-02-28 | End: 2018-02-28

## 2018-02-28 RX ADMIN — SODIUM CHLORIDE 1000 ML: 9 INJECTION, SOLUTION INTRAVENOUS at 01:32

## 2018-02-28 RX ADMIN — LORAZEPAM 1 MG: 1 TABLET ORAL at 02:53
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
## 2018-03-05 ENCOUNTER — TELEPHONE (OUTPATIENT)
Dept: BARIATRICS/WEIGHT MGMT | Facility: CLINIC | Age: 48
End: 2018-03-05

## 2018-03-05 NOTE — TELEPHONE ENCOUNTER
Pt called in stating that she feels she is having a allergic reaction (GI intolerance)  to her Protein shakes.  She stated she has tried several different kinds even the ones with soy and she can not drink them either. Please advise, thank you.

## 2018-03-07 NOTE — TELEPHONE ENCOUNTER
Spoke with pt and she has found a shake she is tolerating. I informed pt if she had any other concerns to give us a call.

## 2018-03-15 ENCOUNTER — TRANSCRIBE ORDERS (OUTPATIENT)
Dept: INFUSION THERAPY | Facility: HOSPITAL | Age: 48
End: 2018-03-15

## 2018-03-15 ENCOUNTER — HOSPITAL ENCOUNTER (OUTPATIENT)
Dept: INFUSION THERAPY | Facility: HOSPITAL | Age: 48
Discharge: HOME | End: 2018-03-15
Admitting: FAMILY MEDICINE

## 2018-03-15 VITALS
SYSTOLIC BLOOD PRESSURE: 123 MMHG | TEMPERATURE: 97.6 F | WEIGHT: 261 LBS | RESPIRATION RATE: 18 BRPM | BODY MASS INDEX: 48.03 KG/M2 | HEART RATE: 80 BPM | HEIGHT: 62 IN | DIASTOLIC BLOOD PRESSURE: 71 MMHG

## 2018-03-15 DIAGNOSIS — E86.0 DEHYDRATION: Primary | ICD-10-CM

## 2018-03-15 DIAGNOSIS — E86.0 DEHYDRATION: ICD-10-CM

## 2018-03-15 LAB
ALBUMIN SERPL-MCNC: 4.1 G/DL (ref 3.5–5)
ALBUMIN/GLOB SERPL: 1.4 G/DL (ref 1.5–2.5)
ALP SERPL-CCNC: 76 U/L (ref 35–104)
ALT SERPL W P-5'-P-CCNC: 22 U/L (ref 10–36)
ANION GAP SERPL CALCULATED.3IONS-SCNC: 6.6 MMOL/L (ref 3.6–11.2)
AST SERPL-CCNC: 15 U/L (ref 10–30)
BILIRUB SERPL-MCNC: 0.3 MG/DL (ref 0.2–1.8)
BUN BLD-MCNC: 11 MG/DL (ref 7–21)
BUN/CREAT SERPL: 21.6 (ref 7–25)
CALCIUM SPEC-SCNC: 9.1 MG/DL (ref 7.7–10)
CHLORIDE SERPL-SCNC: 109 MMOL/L (ref 99–112)
CO2 SERPL-SCNC: 23.4 MMOL/L (ref 24.3–31.9)
CREAT BLD-MCNC: 0.51 MG/DL (ref 0.43–1.29)
GFR SERPL CREATININE-BSD FRML MDRD: 130 ML/MIN/1.73
GLOBULIN UR ELPH-MCNC: 3 GM/DL
GLUCOSE BLD-MCNC: 132 MG/DL (ref 70–110)
OSMOLALITY SERPL CALC.SUM OF ELEC: 278.8 MOSM/KG (ref 273–305)
POTASSIUM BLD-SCNC: 3.8 MMOL/L (ref 3.5–5.3)
PROT SERPL-MCNC: 7.1 G/DL (ref 6–8)
SODIUM BLD-SCNC: 139 MMOL/L (ref 135–153)

## 2018-03-15 PROCEDURE — 96360 HYDRATION IV INFUSION INIT: CPT

## 2018-03-15 PROCEDURE — 36415 COLL VENOUS BLD VENIPUNCTURE: CPT

## 2018-03-15 PROCEDURE — 80053 COMPREHEN METABOLIC PANEL: CPT | Performed by: FAMILY MEDICINE

## 2018-03-15 RX ORDER — SODIUM CHLORIDE 9 MG/ML
INJECTION, SOLUTION INTRAVENOUS
Status: DISCONTINUED
Start: 2018-03-15 | End: 2018-03-17 | Stop reason: HOSPADM

## 2018-03-15 RX ORDER — SODIUM CHLORIDE 9 MG/ML
1000 INJECTION, SOLUTION INTRAVENOUS ONCE
Status: COMPLETED | OUTPATIENT
Start: 2018-03-15 | End: 2018-03-15

## 2018-03-15 RX ADMIN — SODIUM CHLORIDE 1000 ML: 9 INJECTION, SOLUTION INTRAVENOUS at 12:05

## 2018-03-15 NOTE — PATIENT INSTRUCTIONS
Rehydration, Adult  Rehydration is the replacement of body fluids and salts and minerals (electrolytes) that are lost during dehydration. Dehydration is when there is not enough fluid or water in the body. This happens when you lose more fluids than you take in. Common causes of dehydration include:  · Vomiting.  · Diarrhea.  · Excessive sweating, such as from heat exposure or exercise.  · Taking medicines that cause the body to lose excess fluid (diuretics).  · Impaired kidney function.  · Not drinking enough fluid.  · Certain illnesses or infections.  · Certain poorly controlled long-term (chronic) illnesses, such as diabetes, heart disease, and kidney disease.  Symptoms of mild dehydration may include thirst, dry lips and mouth, dry skin, and dizziness. Symptoms of severe dehydration may include increased heart rate, confusion, fainting, and not urinating.  You can rehydrate by drinking certain fluids or getting fluids through an IV tube, as told by your health care provider.  What are the risks?  Generally, rehydration is safe. However, one problem that can happen is taking in too much fluid (overhydration). This is rare. If overhydration happens, it can cause an electrolyte imbalance, kidney failure, or a decrease in salt (sodium) levels in the body.  How to rehydrate  Follow instructions from your health care provider for rehydration. The kind of fluid you should drink and the amount you should drink depend on your condition.  · If directed by your health care provider, drink an oral rehydration solution (ORS). This is a drink designed to treat dehydration that is found in pharmacies and retail stores.  ¨ Make an ORS by following instructions on the package.  ¨ Start by drinking small amounts, about ½ cup (120 mL) every 5-10 minutes.  ¨ Slowly increase how much you drink until you have taken the amount recommended by your health care provider.  · Drink enough clear fluids to keep your urine clear or pale  yellow. If you were instructed to drink an ORS, finish the ORS first, then start slowly drinking other clear fluids. Drink fluids such as:  ¨ Water. Do not drink only water. Doing that can lead to having too little sodium in your body (hyponatremia).  ¨ Ice chips.  ¨ Fruit juice that you have added water to (diluted juice).  ¨ Low-calorie sports drinks.  · If you are severely dehydrated, your health care provider may recommend that you receive fluids through an IV tube in the hospital.  · Do not take sodium tablets. Doing that can lead to the condition of having too much sodium in your body (hypernatremia).  Eating while you rehydrate  Follow instructions from your health care provider about what to eat while you rehydrate. Your health care provider may recommend that you slowly begin eating regular foods in small amounts.  · Eat foods that contain a healthy balance of electrolytes, such as bananas, oranges, potatoes, tomatoes, and spinach.  · Avoid foods that are greasy or contain a lot of fat or sugar.  In some cases, you may get nutrition through a feeding tube that is passed through your nose and into your stomach (nasogastric tube, or NG tube). This may be done if you have uncontrolled vomiting or diarrhea.  Beverages to avoid  Certain beverages may make dehydration worse. While you rehydrate, avoid:  · Alcohol.  · Caffeine.  · Drinks that contain a lot of sugar. These include:  ¨ High-calorie sports drinks.  ¨ Fruit juice that is not diluted.  ¨ Soda.  Check nutrition labels to see how much sugar or caffeine a beverage contains.  Signs of dehydration recovery  You may be recovering from dehydration if:  · You are urinating more often than before you started rehydrating.  · Your urine is clear or pale yellow.  · Your energy level improves.  · You vomit less frequently.  · You have diarrhea less frequently.  · Your appetite improves or returns to normal.  · You feel less dizzy or less light-headed.  · Your skin  tone and color start to look more normal.  Contact a health care provider if:  · You continue to have symptoms of mild dehydration, such as:  ¨ Thirst.  ¨ Dry lips.  ¨ Slightly dry mouth.  ¨ Dry, warm skin.  ¨ Dizziness.  · You continue to vomit or have diarrhea.  Get help right away if:  · You have symptoms of dehydration that get worse.  · You feel:  ¨ Confused.  ¨ Weak.  ¨ Like you are going to faint.  · You have not urinated in 6-8 hours.  · You have very dark urine.  · You have trouble breathing.  · Your heart rate while sitting still is over 100 beats a minute.  · You cannot drink fluids without vomiting.  · You have vomiting or diarrhea that:  ¨ Gets worse.  ¨ Does not go away.  · You have a fever.  This information is not intended to replace advice given to you by your health care provider. Make sure you discuss any questions you have with your health care provider.  Document Released: 03/11/2013 Document Revised: 07/07/2017 Document Reviewed: 02/10/2017  [employer]vier Interactive Patient Education © 2017 [employer]

## 2018-03-21 ENCOUNTER — OFFICE VISIT (OUTPATIENT)
Dept: BARIATRICS/WEIGHT MGMT | Facility: CLINIC | Age: 48
End: 2018-03-21

## 2018-03-21 VITALS
TEMPERATURE: 97.9 F | WEIGHT: 256.01 LBS | DIASTOLIC BLOOD PRESSURE: 91 MMHG | HEART RATE: 88 BPM | SYSTOLIC BLOOD PRESSURE: 157 MMHG | BODY MASS INDEX: 47.11 KG/M2 | OXYGEN SATURATION: 99 % | RESPIRATION RATE: 18 BRPM | HEIGHT: 62 IN

## 2018-03-21 DIAGNOSIS — Z13.21 MALNUTRITION SCREEN: ICD-10-CM

## 2018-03-21 DIAGNOSIS — E78.5 HYPERLIPIDEMIA, UNSPECIFIED HYPERLIPIDEMIA TYPE: ICD-10-CM

## 2018-03-21 DIAGNOSIS — Z13.0 SCREENING, IRON DEFICIENCY ANEMIA: ICD-10-CM

## 2018-03-21 DIAGNOSIS — E66.01 OBESITY, CLASS III, BMI 40-49.9 (MORBID OBESITY) (HCC): ICD-10-CM

## 2018-03-21 DIAGNOSIS — E55.9 HYPOVITAMINOSIS D: ICD-10-CM

## 2018-03-21 DIAGNOSIS — K91.2 POSTGASTRECTOMY MALABSORPTION: ICD-10-CM

## 2018-03-21 DIAGNOSIS — I10 ESSENTIAL HYPERTENSION: ICD-10-CM

## 2018-03-21 DIAGNOSIS — Z90.3 POSTGASTRECTOMY MALABSORPTION: ICD-10-CM

## 2018-03-21 DIAGNOSIS — M19.90 OSTEOARTHRITIS, UNSPECIFIED OSTEOARTHRITIS TYPE, UNSPECIFIED SITE: ICD-10-CM

## 2018-03-21 DIAGNOSIS — R53.83 FATIGUE, UNSPECIFIED TYPE: Primary | ICD-10-CM

## 2018-03-21 DIAGNOSIS — G47.33 OBSTRUCTIVE SLEEP APNEA SYNDROME: ICD-10-CM

## 2018-03-21 DIAGNOSIS — J45.909 ASTHMA, UNSPECIFIED ASTHMA SEVERITY, UNSPECIFIED WHETHER COMPLICATED, UNSPECIFIED WHETHER PERSISTENT: ICD-10-CM

## 2018-03-21 DIAGNOSIS — K21.9 GASTROESOPHAGEAL REFLUX DISEASE, ESOPHAGITIS PRESENCE NOT SPECIFIED: ICD-10-CM

## 2018-03-21 DIAGNOSIS — R73.03 PREDIABETES: ICD-10-CM

## 2018-03-21 DIAGNOSIS — Z98.84 STATUS POST BARIATRIC SURGERY: ICD-10-CM

## 2018-03-21 PROCEDURE — 99024 POSTOP FOLLOW-UP VISIT: CPT | Performed by: SURGERY

## 2018-03-21 RX ORDER — POLYETHYLENE GLYCOL 3350 17 G/17G
17 POWDER, FOR SOLUTION ORAL DAILY
Qty: 30 EACH | Refills: 2 | Status: SHIPPED | OUTPATIENT
Start: 2018-03-21 | End: 2018-08-29

## 2018-03-21 NOTE — PROGRESS NOTES
[facility] Bariatric Surgery  [address]  [facility] [address] [phone]      Patient Name:  Shaun Morales.  :  1970      Date of Visit: 3/21/2018      Reason for Visit:  1 month postop    HPI:  Shaun Morales is a 47 y.o. female s/p 18 LSG/para HHR by Dr. Mckeon    Doing well.  No issues/concerns. Denies dysphagia, reflux, nausea, vomiting and abdominal pain.  +Constipation, takes Amitiza/Linzess.      Tolerating diet progression - on stage 4.  Getting 80-100g prot/day.  Drinking 44 fluid oz/day.  Taking MVI, iron and Vit C.  Forgets the meds in the middle of the day.  On Omeprazole  and Actigall .  Still holding ASA , NSAIDs  and Steroids.  Exercise: walked 1 mile roundtrip to the store.  To join gym.       Presurgery weight:  279 pounds. Today's weight is 116 kg (256 lb 0.2 oz) pounds, today's Body mass index is 46.82 kg/m²., and her weight loss since surgery is 23 pounds.       Regrets surgery because doesn't like to remember vitamins and doesn't like to not like the taste of water.  Glad she lost weight.      Her PCP sent her to the infusion clinic to get some fluids last week.  Has herpetic outbreak on lips, to start Valtrex soon.      Family has been very discouraging about weight loss surgery, and patient feels discouraged. Disappointed because she and her  used to like going out to eat and now she can't enjoy that.      Past Medical History:   Diagnosis Date   • Anxiety    • Asthma     mild   • Back pain    • Depression    • Environmental allergies     pollen, pollution   • Family history of pseudocholinesterase deficiency     sister, dad had it.  Unsure if she has it, but reports has never had succinylcholine   • Fatigue    • GERD (gastroesophageal reflux disease)     rarely, associated with onions and red sauces, avoids both.  PRN Keron, EGD with Dr. Rich 2017, op report reviewed, no HH. urease neg. serum h. pyl neg   • H. pylori infection      "symptoms of abodminal pain/dyspepsia, tx   • History of MRSA infection     UTI WITH REPORRTED 3-4 CLEAN CATCH WWITH NO mrSA    • Hyperlipidemia    • Hypertension    • IBS (irritable bowel syndrome)     constipation   • Interstitial cystitis     recent procedure to \"stretch\" the bladder, feels better; has pain in bladder as primary symptom, dyspareunia   • Mitral valve regurgitation     better now, has no symptoms, + hear murmur   • Mood disorder     no formal dx of bipolar disorder, but after bad divorce took lithium   • Nausea     car sickness, has PRN Zofran, motion sickness, previously took meclizine   • Obstructive sleep apnea on CPAP     CPAP compliant setting 11   • Peripheral edema    • PONV (postoperative nausea and vomiting)    • Prediabetes    • Psoriasis    • Psoriatic arthritis    • Trauma of chest      moving trailer, fell on her, several cracked ribs on left, feels it caused her umbo hernia recurrence.  had to be dug out.  Exhusband fx pelvis, mult surgeries.   • Vitamin D deficiency      Past Surgical History:   Procedure Laterality Date   • APPENDECTOMY     • CARDIAC CATHETERIZATION      done after discovery of MVP, also had a CHERISE, normal per patient   •  SECTION     • COLONOSCOPY     • CYSTOSCOPY BLADDER HYDRODISTENSION N/A 2017    Procedure: CYSTOSCOPY BLADDER HYDRODISTENSION;  Surgeon: Ion Herbert MD;  [facility]:  [facility];  Service:    • DIAGNOSTIC LAPAROSCOPY N/A 10/14/2016    Procedure: DIAGNOSTIC LAPAROSCOPY POSSIBLE RIGHT SALPINGOOPHORECTOMY;  Surgeon: Rory Owens DO;  [facility]  [facility];  Service:    • ENDOSCOPY      Dr. Rich   • ENDOSCOPY N/A 2018    Procedure: ESOPHAGOGASTRODUODENOSCOPY;  Surgeon: Aneesh Mckeon MD;  Location:  [facility];  Service:    • FOOT SURGERY     • GASTRIC SLEEVE LAPAROSCOPIC N/A 2018    Procedure: GASTRIC SLEEVE LAPAROSCOPIC;  Surgeon: Aneesh Mckeon MD;  Location:  [facility];  " Service:    • HYSTERECTOMY  2008, 2016    laparoscopy supra-cervical hysterectomy 2008, 2016 cervix and 1 ovary removed.  Remaining ovary has a cyst. initially done for pelvic pain/fibroids   • LAPAROSCOPIC APPENDECTOMY  2015   • PARAESOPHAGEAL HERNIA REPAIR N/A 2/23/2018    Procedure: PARAESOPHAGEAL HERNIA REPAIR LAPAROSCOPIC;  Surgeon: Aneesh Mckeon MD;  Location:  [facility];  Service:    • SKIN BIOPSY     • STOMACH SURGERY      Gastric Sleeve    • TRACHELECTOMY N/A 10/14/2016    Procedure: TRACHELECTOMY VAGINAL;  Surgeon: Rory Owens DO;  [facility]  [facility];  Service:    • TUBAL ABDOMINAL LIGATION  2000    LEFT OVARY REMAINS   • UMBILICAL HERNIA REPAIR N/A 10/14/2016    Procedure: UMBILICAL HERNIA REPAIR;  Surgeon: Spike Porter MD;  [facility]  [facility];  Service:    • UMBILICAL HERNIA REPAIR N/A 12/9/2016    Procedure: UMBILICAL HERNIA REPAIR;  Surgeon: Spike Porter MD;  [facility]:  [facility];  with mesh, supraumbilical   • WISDOM TOOTH EXTRACTION  2000     Outpatient Prescriptions Marked as Taking for the 3/21/18 encounter (Office Visit) with Hillary Christianson MD   Medication Sig Dispense Refill   • albuterol (PROVENTIL HFA;VENTOLIN HFA) 108 (90 BASE) MCG/ACT inhaler Inhale 2 puffs every 4 (four) hours as needed for wheezing     • cetirizine (ZyrTEC) 10 MG tablet Take 10 mg by mouth daily     • HYDROcodone-acetaminophen (NORCO) 7.5-325 MG per tablet Take 1 tablet by mouth Every 6 (Six) Hours As Needed for Moderate Pain . 30 tablet 0   • losartan (COZAAR) 50 MG tablet Take 50 mg by mouth 2 (Two) Times a Day.     • lubiprostone (AMITIZA) 8 MCG capsule Take 8 mcg by mouth Daily     • metoprolol succinate XL (TOPROL-XL) 50 MG 24 hr tablet Take 50 mg by mouth daily     • montelukast (SINGULAIR) 10 MG tablet Take 10 mg by mouth Daily.     • omeprazole (PRILOSEC) 40 MG capsule Take 1 capsule by mouth Daily. 60 capsule 0   • ondansetron ODT (ZOFRAN ODT) 4 MG disintegrating tablet Take 1 tablet  "by mouth Every 8 (Eight) Hours As Needed for Nausea or Vomiting. 20 tablet 0   • promethazine (PHENERGAN) 12.5 MG tablet Take 1 tablet by mouth Every 6 (Six) Hours As Needed for Nausea or Vomiting. 20 tablet 0   • simvastatin (ZOCOR) 10 MG tablet Take 10 mg by mouth Every Night.     • ursodiol (ACTIGALL) 300 MG capsule Take 1 capsule by mouth 2 (Two) Times a Day for 30 days. 60 capsule 5     Allergies   Allergen Reactions   • Amlodipine GI Intolerance and Arrhythmia   • Morphine And Related Itching     Dilaudid ok, percocet/lortab ok   • Nifedipine GI Intolerance and Arrhythmia     Adalat, procardia   • Nsaids Unknown (See Comments)     Pt states she cannot take NSAIDs for a peroid of time after having her Gastric Sleeve Surgery    • Other Other (See Comments)     Pt states she cannot taken Steroids for some time after Gastric sleeve operation    • Adhesive Tape Rash     Can tolerate steristrips and skin glue   • Chlorhexidine Rash   • Diazepam Anxiety     \"reverse effect,\" \"makes me absolutely crazy\"   • Midazolam Anxiety   • Sulfa Antibiotics Rash       Social History     Social History   • Marital status:      Spouse name: N/A   • Number of children: N/A   • Years of education: N/A     Occupational History   • Not on file.     Social History Main Topics   • Smoking status: Former Smoker     Packs/day: 0.50     Years: 10.00     Types: Cigarettes     Quit date: 1/1/2013   • Smokeless tobacco: Never Used      Comment: Is around secondhand smoke occasionally in car but not in house.  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         /91 (BP Location: Left arm, Patient Position: Sitting, Cuff Size: Large Adult)   Pulse 88   Temp 97.9 °F (36.6 °C) (Temporal Artery )   Resp 18   Ht 157.5 cm (62\")   Wt " 116 kg (256 lb 0.2 oz)   LMP  (LMP Unknown) Comment: hysterectomy  SpO2 99%   BMI 46.82 kg/m²     Physical Exam   Constitutional: She is oriented to person, place, and time. She appears well-developed and well-nourished. No distress.   HENT:   Head: Normocephalic and atraumatic.   Mouth/Throat: No oropharyngeal exudate.   Eyes: Conjunctivae and EOM are normal. Pupils are equal, round, and reactive to light.   Pulmonary/Chest: Effort normal. No respiratory distress.   Abdominal: Soft. She exhibits no distension. There is no tenderness.   Incisions healing, except for one lap site with skin dehisence, some fibrinous exudate, no erythema/induration   Neurological: She is alert and oriented to person, place, and time. No cranial nerve deficit.   Skin: Skin is warm and dry. No rash noted. She is not diaphoretic. No erythema.   Psychiatric: She has a normal mood and affect. Her behavior is normal. Judgment and thought content normal.         Assessment:  1 month s/p 2/23/18 LSG/ para HHR by Dr. Mckeon    Plan:  Doing well.  Continue to advance diet per manual.  Continue protein >70g/day.  Encouraged good food choices - high protein, low carb.  Continue routine exercise.  Routine bariatric labs ordered.  Continue vitamins w/ adjustments pending lab results.  Call w/ problems/concerns.    Gave encouragement that her course is as expected and she is doing well.  Seems depressed, and very negative.  Suggest she see her PCP re: starting back on antidepressants.  Re: constipation, start drinking more water, Miralax prescribed.      The patient was instructed to follow up in 2 months, sooner if needed.     Hillary Christianson MD

## 2018-03-27 LAB
25(OH)D3+25(OH)D2 SERPL-MCNC: 24.7 NG/ML (ref 30–100)
A-TOCOPHEROL VIT E SERPL-MCNC: 8.6 MG/L (ref 5.3–16.8)
ALBUMIN SERPL-MCNC: 4.5 G/DL (ref 3.5–5.5)
ALBUMIN/GLOB SERPL: 1.6 {RATIO} (ref 1.2–2.2)
ALP SERPL-CCNC: 76 IU/L (ref 39–117)
ALT SERPL-CCNC: 18 IU/L (ref 0–32)
AST SERPL-CCNC: 15 IU/L (ref 0–40)
BASOPHILS # BLD AUTO: 0 X10E3/UL (ref 0–0.2)
BASOPHILS NFR BLD AUTO: 0 %
BILIRUB SERPL-MCNC: 0.4 MG/DL (ref 0–1.2)
BUN SERPL-MCNC: 12 MG/DL (ref 6–24)
BUN/CREAT SERPL: 20 (ref 9–23)
CALCIUM SERPL-MCNC: 9.5 MG/DL (ref 8.7–10.2)
CHLORIDE SERPL-SCNC: 102 MMOL/L (ref 96–106)
CO2 SERPL-SCNC: 22 MMOL/L (ref 18–29)
CREAT SERPL-MCNC: 0.59 MG/DL (ref 0.57–1)
EOSINOPHIL # BLD AUTO: 0.4 X10E3/UL (ref 0–0.4)
EOSINOPHIL NFR BLD AUTO: 4 %
ERYTHROCYTE [DISTWIDTH] IN BLOOD BY AUTOMATED COUNT: 13.6 % (ref 12.3–15.4)
FERRITIN SERPL-MCNC: 220 NG/ML (ref 15–150)
FOLATE SERPL-MCNC: 15.1 NG/ML
GFR SERPLBLD CREATININE-BSD FMLA CKD-EPI: 111 ML/MIN/1.73
GFR SERPLBLD CREATININE-BSD FMLA CKD-EPI: 128 ML/MIN/1.73
GLOBULIN SER CALC-MCNC: 2.8 G/DL (ref 1.5–4.5)
GLUCOSE SERPL-MCNC: 107 MG/DL (ref 65–99)
HCT VFR BLD AUTO: 42.7 % (ref 34–46.6)
HGB BLD-MCNC: 14.5 G/DL (ref 11.1–15.9)
IMM GRANULOCYTES # BLD: 0 X10E3/UL (ref 0–0.1)
IMM GRANULOCYTES NFR BLD: 0 %
IRON SERPL-MCNC: 60 UG/DL (ref 27–159)
LYMPHOCYTES # BLD AUTO: 2.6 X10E3/UL (ref 0.7–3.1)
LYMPHOCYTES NFR BLD AUTO: 31 %
MAGNESIUM SERPL-MCNC: 2 MG/DL (ref 1.6–2.3)
MCH RBC QN AUTO: 30.6 PG (ref 26.6–33)
MCHC RBC AUTO-ENTMCNC: 34 G/DL (ref 31.5–35.7)
MCV RBC AUTO: 90 FL (ref 79–97)
METHYLMALONATE SERPL-SCNC: 77 NMOL/L (ref 0–378)
MONOCYTES # BLD AUTO: 0.6 X10E3/UL (ref 0.1–0.9)
MONOCYTES NFR BLD AUTO: 7 %
NEUTROPHILS # BLD AUTO: 4.9 X10E3/UL (ref 1.4–7)
NEUTROPHILS NFR BLD AUTO: 58 %
PHOSPHATE SERPL-MCNC: 3.9 MG/DL (ref 2.5–4.5)
PLATELET # BLD AUTO: 399 X10E3/UL (ref 150–379)
POTASSIUM SERPL-SCNC: 4.9 MMOL/L (ref 3.5–5.2)
PREALB SERPL-MCNC: 22 MG/DL (ref 12–34)
PROT SERPL-MCNC: 7.3 G/DL (ref 6–8.5)
PTH-INTACT SERPL-MCNC: 22 PG/ML (ref 15–65)
RBC # BLD AUTO: 4.74 X10E6/UL (ref 3.77–5.28)
SODIUM SERPL-SCNC: 140 MMOL/L (ref 134–144)
VIT A SERPL-MCNC: 49 UG/DL (ref 20–65)
VIT B1 BLD-SCNC: 155.3 NMOL/L (ref 66.5–200)
WBC # BLD AUTO: 8.5 X10E3/UL (ref 3.4–10.8)
ZINC SERPL-MCNC: 97 UG/DL (ref 56–134)

## 2018-03-28 RX ORDER — ERGOCALCIFEROL 1.25 MG/1
50000 CAPSULE ORAL WEEKLY
Qty: 12 CAPSULE | Refills: 0 | Status: SHIPPED | OUTPATIENT
Start: 2018-03-28 | End: 2018-06-01 | Stop reason: SDUPTHER

## 2018-03-29 ENCOUNTER — HOSPITAL ENCOUNTER (EMERGENCY)
Facility: HOSPITAL | Age: 48
Discharge: HOME | End: 2018-03-29
Attending: EMERGENCY MEDICINE | Admitting: EMERGENCY MEDICINE

## 2018-03-29 VITALS
RESPIRATION RATE: 16 BRPM | DIASTOLIC BLOOD PRESSURE: 96 MMHG | TEMPERATURE: 98 F | OXYGEN SATURATION: 100 % | SYSTOLIC BLOOD PRESSURE: 135 MMHG | HEART RATE: 88 BPM | BODY MASS INDEX: 47.29 KG/M2 | HEIGHT: 62 IN | WEIGHT: 257 LBS

## 2018-03-29 DIAGNOSIS — E86.0 DEHYDRATION: Primary | ICD-10-CM

## 2018-03-29 LAB
ALBUMIN SERPL-MCNC: 4.4 G/DL (ref 3.5–5)
ALBUMIN/GLOB SERPL: 1.4 G/DL (ref 1.5–2.5)
ALP SERPL-CCNC: 80 U/L (ref 35–104)
ALT SERPL W P-5'-P-CCNC: 21 U/L (ref 10–36)
ANION GAP SERPL CALCULATED.3IONS-SCNC: 10.6 MMOL/L (ref 3.6–11.2)
AST SERPL-CCNC: 19 U/L (ref 10–30)
BASOPHILS # BLD AUTO: 0.03 10*3/MM3 (ref 0–0.3)
BASOPHILS NFR BLD AUTO: 0.3 % (ref 0–2)
BILIRUB SERPL-MCNC: 0.3 MG/DL (ref 0.2–1.8)
BUN BLD-MCNC: 15 MG/DL (ref 7–21)
BUN/CREAT SERPL: 27.3 (ref 7–25)
CALCIUM SPEC-SCNC: 9.8 MG/DL (ref 7.7–10)
CHLORIDE SERPL-SCNC: 106 MMOL/L (ref 99–112)
CO2 SERPL-SCNC: 24.4 MMOL/L (ref 24.3–31.9)
CREAT BLD-MCNC: 0.55 MG/DL (ref 0.43–1.29)
DEPRECATED RDW RBC AUTO: 42 FL (ref 37–54)
EOSINOPHIL # BLD AUTO: 0.37 10*3/MM3 (ref 0–0.7)
EOSINOPHIL NFR BLD AUTO: 3.9 % (ref 0–5)
ERYTHROCYTE [DISTWIDTH] IN BLOOD BY AUTOMATED COUNT: 12.8 % (ref 11.5–14.5)
GFR SERPL CREATININE-BSD FRML MDRD: 120 ML/MIN/1.73
GLOBULIN UR ELPH-MCNC: 3.2 GM/DL
GLUCOSE BLD-MCNC: 105 MG/DL (ref 70–110)
HCT VFR BLD AUTO: 41.3 % (ref 37–47)
HGB BLD-MCNC: 13.8 G/DL (ref 12–16)
IMM GRANULOCYTES # BLD: 0.01 10*3/MM3 (ref 0–0.03)
IMM GRANULOCYTES NFR BLD: 0.1 % (ref 0–0.5)
LYMPHOCYTES # BLD AUTO: 3.62 10*3/MM3 (ref 1–3)
LYMPHOCYTES NFR BLD AUTO: 38.6 % (ref 21–51)
MCH RBC QN AUTO: 30.3 PG (ref 27–33)
MCHC RBC AUTO-ENTMCNC: 33.4 G/DL (ref 33–37)
MCV RBC AUTO: 90.6 FL (ref 80–94)
MONOCYTES # BLD AUTO: 0.78 10*3/MM3 (ref 0.1–0.9)
MONOCYTES NFR BLD AUTO: 8.3 % (ref 0–10)
NEUTROPHILS # BLD AUTO: 4.56 10*3/MM3 (ref 1.4–6.5)
NEUTROPHILS NFR BLD AUTO: 48.8 % (ref 30–70)
OSMOLALITY SERPL CALC.SUM OF ELEC: 282.5 MOSM/KG (ref 273–305)
PLATELET # BLD AUTO: 347 10*3/MM3 (ref 130–400)
PMV BLD AUTO: 11.3 FL (ref 6–10)
POTASSIUM BLD-SCNC: 3.8 MMOL/L (ref 3.5–5.3)
PROT SERPL-MCNC: 7.6 G/DL (ref 6–8)
RBC # BLD AUTO: 4.56 10*6/MM3 (ref 4.2–5.4)
SODIUM BLD-SCNC: 141 MMOL/L (ref 135–153)
WBC NRBC COR # BLD: 9.37 10*3/MM3 (ref 4.5–12.5)

## 2018-03-29 PROCEDURE — 96360 HYDRATION IV INFUSION INIT: CPT

## 2018-03-29 PROCEDURE — 85025 COMPLETE CBC W/AUTO DIFF WBC: CPT | Performed by: PHYSICIAN ASSISTANT

## 2018-03-29 PROCEDURE — 99283 EMERGENCY DEPT VISIT LOW MDM: CPT

## 2018-03-29 PROCEDURE — 80053 COMPREHEN METABOLIC PANEL: CPT | Performed by: PHYSICIAN ASSISTANT

## 2018-03-29 RX ORDER — SODIUM CHLORIDE 0.9 % (FLUSH) 0.9 %
10 SYRINGE (ML) INJECTION AS NEEDED
Status: DISCONTINUED | OUTPATIENT
Start: 2018-03-29 | End: 2018-03-30 | Stop reason: HOSPADM

## 2018-03-29 RX ADMIN — SODIUM CHLORIDE 1000 ML: 900 INJECTION, SOLUTION INTRAVENOUS at 22:25

## 2018-04-04 ENCOUNTER — HOSPITAL ENCOUNTER (OUTPATIENT)
Dept: MAMMOGRAPHY | Facility: HOSPITAL | Age: 48
Discharge: HOME | End: 2018-04-04
Admitting: NURSE PRACTITIONER

## 2018-04-04 DIAGNOSIS — Z12.31 VISIT FOR SCREENING MAMMOGRAM: ICD-10-CM

## 2018-04-04 PROCEDURE — 77067 SCR MAMMO BI INCL CAD: CPT

## 2018-04-04 PROCEDURE — 77067 SCR MAMMO BI INCL CAD: CPT | Performed by: RADIOLOGY

## 2018-04-04 PROCEDURE — 77063 BREAST TOMOSYNTHESIS BI: CPT

## 2018-04-04 PROCEDURE — 77063 BREAST TOMOSYNTHESIS BI: CPT | Performed by: RADIOLOGY

## 2018-05-29 ENCOUNTER — OFFICE VISIT (OUTPATIENT)
Dept: BARIATRICS/WEIGHT MGMT | Facility: CLINIC | Age: 48
End: 2018-05-29

## 2018-05-29 VITALS
BODY MASS INDEX: 43.34 KG/M2 | OXYGEN SATURATION: 99 % | HEIGHT: 62 IN | WEIGHT: 235.51 LBS | RESPIRATION RATE: 18 BRPM | TEMPERATURE: 97.8 F | HEART RATE: 56 BPM | DIASTOLIC BLOOD PRESSURE: 98 MMHG | SYSTOLIC BLOOD PRESSURE: 146 MMHG

## 2018-05-29 DIAGNOSIS — Z13.21 MALNUTRITION SCREEN: ICD-10-CM

## 2018-05-29 DIAGNOSIS — R53.83 FATIGUE, UNSPECIFIED TYPE: ICD-10-CM

## 2018-05-29 DIAGNOSIS — Z13.0 SCREENING, IRON DEFICIENCY ANEMIA: ICD-10-CM

## 2018-05-29 DIAGNOSIS — E55.9 HYPOVITAMINOSIS D: ICD-10-CM

## 2018-05-29 DIAGNOSIS — E66.01 OBESITY, CLASS III, BMI 40-49.9 (MORBID OBESITY) (HCC): ICD-10-CM

## 2018-05-29 DIAGNOSIS — Z90.3 POSTGASTRECTOMY MALABSORPTION: ICD-10-CM

## 2018-05-29 DIAGNOSIS — K91.2 POSTGASTRECTOMY MALABSORPTION: ICD-10-CM

## 2018-05-29 DIAGNOSIS — Z98.84 STATUS POST BARIATRIC SURGERY: Primary | ICD-10-CM

## 2018-05-29 PROCEDURE — 99214 OFFICE O/P EST MOD 30 MIN: CPT | Performed by: PHYSICIAN ASSISTANT

## 2018-05-29 RX ORDER — OMEPRAZOLE 40 MG/1
40 CAPSULE, DELAYED RELEASE ORAL 2 TIMES DAILY
Qty: 60 CAPSULE | Refills: 2 | Status: SHIPPED | OUTPATIENT
Start: 2018-05-29 | End: 2019-04-08

## 2018-05-29 NOTE — PROGRESS NOTES
"[facility] Bariatric Surgery  [address]  [facility] [address] [phone]        Patient Name:  Shaun Morales.  :  1970      Date of Visit: 2018      Reason for Visit:   3 months postop      HPI: Shaun Morales is a 47 y.o. female s/p 18 LSG/para HHR by Dr. Mike SANDRA 1 month postop was discouraged with WLS, depressed mood, wanted to be able to go out to eat, family was not supportive. PCP had sent to infusion center for fluids.     ED visit 3/29/18 for headache, fluids given and d/c home.     States she is doing ok. Pleased with weight loss, feels she should be losing it faster.  Feels that she wasn't given enough information prior to surgery.  Does notice reflux 3-4 times a week, despite omeprazole 40mg daily. Discouraging because she rarely had reflux prior to surgery.  Also c/o  Dysphagia with solid foods, feels that it gets hung, noted intermittently, tolerates steak and pork chops most times. States \"it better go away bc I'm not having anything else done. If I had known about this I would have told him not to fix that hernia.\"  No issues with pills, liquids or soft foods. Has noticed hair thinning.  Denies nausea, vomiting and abdominal pain.  Getting typically 70g prot/day through foods, occ drinks protein shakes if needed.   Drinking 60-64 fluid oz/day, water mostly or unsweet tea, body armour drinks (higher sugar, but with vitamins).  1 month labs revealed low Vit D, advised weekly D, did not get this Rx.  Taking MVI, B12, B1, Calcium, Vit D, iron and Vit C, MVI daily- has trouble remembering other vitamins.  On Omeprazole  and Actigall .  Exercising: walking, more active than she used to be, doing a lot of yard work.    BP meds have been reduced. Lipids have improved, plans to d/c statin next check.      Presurgery weight: 279 pounds.  Today's weight is 107 kg (235 lb 8.2 oz) pounds, today's  Body mass index is 43.08 kg/m²., and her weight loss " "since surgery is 44 pounds.      Past Medical History:   Diagnosis Date   • Anxiety    • Asthma     mild   • Back pain    • Depression    • Environmental allergies     pollen, pollution   • Family history of pseudocholinesterase deficiency     sister, dad had it.  Unsure if she has it, but reports has never had succinylcholine   • Fatigue    • GERD (gastroesophageal reflux disease)     rarely, associated with onions and red sauces, avoids both.  PRN Tums, EGD with Dr. Rich 2017, op report reviewed, no HH. urease neg. serum h. pyl neg   • H. pylori infection     symptoms of abodminal pain/dyspepsia, tx   • History of MRSA infection     UTI WITH REPORRTED 3-4 CLEAN CATCH WWITH NO mrSA    • Hyperlipidemia    • Hypertension    • IBS (irritable bowel syndrome)     constipation   • Interstitial cystitis     recent procedure to \"stretch\" the bladder, feels better; has pain in bladder as primary symptom, dyspareunia   • Mitral valve regurgitation     better now, has no symptoms, + hear murmur   • Mood disorder     no formal dx of bipolar disorder, but after bad divorce took lithium   • Nausea     car sickness, has PRN Zofran, motion sickness, previously took meclizine   • Obstructive sleep apnea on CPAP     CPAP compliant setting 11   • Peripheral edema    • PONV (postoperative nausea and vomiting)    • Prediabetes    • Psoriasis    • Psoriatic arthritis    • Trauma of chest      moving trailer, fell on her, several cracked ribs on left, feels it caused her umbo hernia recurrence.  had to be dug out.  Exhusband fx pelvis, mult surgeries.   • Vitamin D deficiency      Past Surgical History:   Procedure Laterality Date   • APPENDECTOMY     • CARDIAC CATHETERIZATION      done after discovery of MVP, also had a CHERISE, normal per patient   •  SECTION     • COLONOSCOPY     • CYSTOSCOPY BLADDER HYDRODISTENSION N/A 2017    Procedure: CYSTOSCOPY BLADDER HYDRODISTENSION;  Surgeon: Ion CORTEZ" MD Piedad;  [facility]:  [facility];  Service:    • DIAGNOSTIC LAPAROSCOPY N/A 10/14/2016    Procedure: DIAGNOSTIC LAPAROSCOPY POSSIBLE RIGHT SALPINGOOPHORECTOMY;  Surgeon: Rory Owens DO;  [facility];  Service:    • ENDOSCOPY  2017    Dr. Rich   • ENDOSCOPY N/A 2/23/2018    Procedure: ESOPHAGOGASTRODUODENOSCOPY;  Surgeon: Aneesh Mckeon MD;  Location:  [facility];  Service:    • FOOT SURGERY  1984   • GASTRIC SLEEVE LAPAROSCOPIC N/A 2/23/2018    Procedure: GASTRIC SLEEVE LAPAROSCOPIC;  Surgeon: Aneesh Mckeon MD;  Location:  [facility];  Service:    • HYSTERECTOMY  2008, 2016    laparoscopy supra-cervical hysterectomy 2008, 2016 cervix and 1 ovary removed.  Remaining ovary has a cyst. initially done for pelvic pain/fibroids   • LAPAROSCOPIC APPENDECTOMY  2015   • PARAESOPHAGEAL HERNIA REPAIR N/A 2/23/2018    Procedure: PARAESOPHAGEAL HERNIA REPAIR LAPAROSCOPIC;  Surgeon: Aneesh Mckeon MD;  Location:  [facility];  Service:    • SKIN BIOPSY     • STOMACH SURGERY      Gastric Sleeve    • TRACHELECTOMY N/A 10/14/2016    Procedure: TRACHELECTOMY VAGINAL;  Surgeon: Rory Owens DO;  [facility];  Service:    • TUBAL ABDOMINAL LIGATION  2000    LEFT OVARY REMAINS   • UMBILICAL HERNIA REPAIR N/A 10/14/2016    Procedure: UMBILICAL HERNIA REPAIR;  Surgeon: Spike Porter MD;  [facility];  Service:    • UMBILICAL HERNIA REPAIR N/A 12/9/2016    Procedure: UMBILICAL HERNIA REPAIR;  Surgeon: Spike Porter MD;  [facility]:  [facility];  with mesh, supraumbilical   • WISDOM TOOTH EXTRACTION  2000     Outpatient Prescriptions Marked as Taking for the 5/29/18 encounter (Office Visit) with Valerie Ceballos PA-C   Medication Sig Dispense Refill   • albuterol (PROVENTIL HFA;VENTOLIN HFA) 108 (90 BASE) MCG/ACT inhaler Inhale 2 puffs every 4 (four) hours as needed for wheezing     • cetirizine (ZyrTEC) 10 MG tablet Take 10 mg by mouth daily     • losartan (COZAAR) 50 MG tablet Take  "50 mg by mouth 2 (Two) Times a Day.     • metoprolol succinate XL (TOPROL-XL) 50 MG 24 hr tablet Take 50 mg by mouth daily     • montelukast (SINGULAIR) 10 MG tablet Take 10 mg by mouth Daily.     • polyethylene glycol (MIRALAX) packet Take 17 g by mouth Daily. 30 each 2   • simvastatin (ZOCOR) 10 MG tablet Take 10 mg by mouth Every Night.     • [DISCONTINUED] omeprazole (PRILOSEC) 40 MG capsule Take 1 capsule by mouth Daily. 60 capsule 0       Allergies   Allergen Reactions   • Amlodipine GI Intolerance and Arrhythmia   • Morphine And Related Itching     Dilaudid ok, percocet/lortab ok   • Nifedipine GI Intolerance and Arrhythmia     Adalat, procardia   • Nsaids Unknown (See Comments)     Pt states she cannot take NSAIDs for a peroid of time after having her Gastric Sleeve Surgery    • Other Other (See Comments)     Pt states she cannot taken Steroids for some time after Gastric sleeve operation    • Adhesive Tape Rash     Can tolerate steristrips and skin glue   • Chlorhexidine Rash   • Diazepam Anxiety     \"reverse effect,\" \"makes me absolutely crazy\"   • Midazolam Anxiety   • Sulfa Antibiotics Rash       Social History     Social History   • Marital status:      Spouse name: N/A   • Number of children: N/A   • Years of education: N/A     Occupational History   • Not on file.     Social History Main Topics   • Smoking status: Former Smoker     Packs/day: 0.50     Years: 10.00     Types: Cigarettes     Quit date: 1/1/2013   • Smokeless tobacco: Never Used      Comment: Is around secondhand smoke occasionally in car but not in house.  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         /98 (BP Location: Left arm, Patient Position: Sitting, Cuff Size: Large Adult)   Pulse 56   Temp 97.8 °F (36.6 °C) " "(Temporal Artery )   Resp 18   Ht 157.5 cm (62\")   Wt 107 kg (235 lb 8.2 oz)   LMP  (LMP Unknown) Comment: hysterectomy  SpO2 99%   BMI 43.08 kg/m²     Physical Exam   Constitutional: She is oriented to person, place, and time. She appears well-developed and well-nourished.   HENT:   Head: Normocephalic and atraumatic.   Cardiovascular: Normal rate and regular rhythm.    Pulmonary/Chest: Effort normal and breath sounds normal.   Abdominal: Soft. Bowel sounds are normal.   Incisions well healed   Neurological: She is alert and oriented to person, place, and time.   Skin: Skin is warm and dry.   Psychiatric: Her behavior is normal. Judgment and thought content normal.   Disgruntled          Assessment:  3 months s/p 2/23/18 LSG/para HHR by Dr. Mckeon    ICD-10-CM ICD-9-CM   1. Status post bariatric surgery Z98.84 V45.86   2. Fatigue, unspecified type R53.83 780.79   3. Hypovitaminosis D E55.9 268.9   4. Screening, iron deficiency anemia Z13.0 V78.0   5. Malnutrition screen Z13.21 V77.2   6. Postgastrectomy malabsorption K91.2 579.3    Z90.3    7. Obesity, Class III, BMI 40-49.9 (morbid obesity) E66.01 278.01         Plan:  Will increase omeprazole  To 40mg BID, Rx sent.  Offered UGI for eval of reflux/ dysphagia, will trial of increaed meds first. Advised small bites, eating slow, small portions. Continue w/ good food choices and healthy habits.  Increase protein to 100g/day.  Injcrease fluids to 64oz daily. Encouraged routine exercise.  Cont actigall. Routine bariatric labs ordered.  Continue vitamins w/ adjustments pending lab results.  Discuss HR and BP with PCP. Call w/ problems/concerns.     The patient was instructed to follow up in 3 months, sooner if needed.      Total time spent w/ patient 25 minutes and 15 minutes spent counseling the patient on nutrition and necessary dietary/lifestyle modifications.      "

## 2018-06-01 LAB
25(OH)D3+25(OH)D2 SERPL-MCNC: 20.9 NG/ML
ALBUMIN SERPL-MCNC: 4.3 G/DL (ref 3.2–4.8)
ALBUMIN/GLOB SERPL: 1.6 G/DL (ref 1.5–2.5)
ALP SERPL-CCNC: 85 U/L (ref 25–100)
ALT SERPL-CCNC: 16 U/L (ref 7–40)
AST SERPL-CCNC: 18 U/L (ref 0–33)
BASOPHILS # BLD AUTO: 0.03 10*3/MM3 (ref 0–0.2)
BASOPHILS NFR BLD AUTO: 0.4 % (ref 0–1)
BILIRUB SERPL-MCNC: 0.5 MG/DL (ref 0.3–1.2)
BUN SERPL-MCNC: 10 MG/DL (ref 9–23)
BUN/CREAT SERPL: 16.7 (ref 7–25)
CALCIUM SERPL-MCNC: 9.1 MG/DL (ref 8.7–10.4)
CHLORIDE SERPL-SCNC: 109 MMOL/L (ref 99–109)
CO2 SERPL-SCNC: 26 MMOL/L (ref 20–31)
CREAT SERPL-MCNC: 0.6 MG/DL (ref 0.6–1.3)
EOSINOPHIL # BLD AUTO: 0.28 10*3/MM3 (ref 0–0.3)
EOSINOPHIL NFR BLD AUTO: 3.9 % (ref 0–3)
ERYTHROCYTE [DISTWIDTH] IN BLOOD BY AUTOMATED COUNT: 14.3 % (ref 11.3–14.5)
FERRITIN SERPL-MCNC: 89 NG/ML (ref 10–291)
FOLATE SERPL-MCNC: 18.29 NG/ML (ref 3.2–20)
GFR SERPLBLD CREATININE-BSD FMLA CKD-EPI: 108 ML/MIN/1.73
GFR SERPLBLD CREATININE-BSD FMLA CKD-EPI: 131 ML/MIN/1.73
GLOBULIN SER CALC-MCNC: 2.7 GM/DL
GLUCOSE SERPL-MCNC: 97 MG/DL (ref 70–100)
HCT VFR BLD AUTO: 41.6 % (ref 34.5–44)
HGB BLD-MCNC: 13.5 G/DL (ref 11.5–15.5)
IMM GRANULOCYTES # BLD: 0.01 10*3/MM3 (ref 0–0.03)
IMM GRANULOCYTES NFR BLD: 0.1 % (ref 0–0.6)
IRON SERPL-MCNC: 70 MCG/DL (ref 50–175)
LYMPHOCYTES # BLD AUTO: 2.52 10*3/MM3 (ref 0.6–4.8)
LYMPHOCYTES NFR BLD AUTO: 35.4 % (ref 24–44)
MCH RBC QN AUTO: 29.5 PG (ref 27–31)
MCHC RBC AUTO-ENTMCNC: 32.5 G/DL (ref 32–36)
MCV RBC AUTO: 90.8 FL (ref 80–99)
METHYLMALONATE SERPL-SCNC: 75 NMOL/L (ref 0–378)
MONOCYTES # BLD AUTO: 0.64 10*3/MM3 (ref 0–1)
MONOCYTES NFR BLD AUTO: 9 % (ref 0–12)
NEUTROPHILS # BLD AUTO: 3.63 10*3/MM3 (ref 1.5–8.3)
NEUTROPHILS NFR BLD AUTO: 51.2 % (ref 41–71)
PLATELET # BLD AUTO: 314 10*3/MM3 (ref 150–450)
POTASSIUM SERPL-SCNC: 4.5 MMOL/L (ref 3.5–5.5)
PREALB SERPL-MCNC: 18 MG/DL (ref 12–34)
PROT SERPL-MCNC: 7 G/DL (ref 5.7–8.2)
RBC # BLD AUTO: 4.58 10*6/MM3 (ref 3.89–5.14)
SODIUM SERPL-SCNC: 141 MMOL/L (ref 132–146)
VIT B1 BLD-SCNC: 107.1 NMOL/L (ref 66.5–200)
WBC # BLD AUTO: 7.11 10*3/MM3 (ref 3.5–10.8)

## 2018-06-01 RX ORDER — ERGOCALCIFEROL 1.25 MG/1
50000 CAPSULE ORAL WEEKLY
Qty: 12 CAPSULE | Refills: 0 | Status: SHIPPED | OUTPATIENT
Start: 2018-06-01 | End: 2018-12-07 | Stop reason: SDUPTHER

## 2018-07-17 ENCOUNTER — HOSPITAL ENCOUNTER (OUTPATIENT)
Dept: GENERAL RADIOLOGY | Facility: HOSPITAL | Age: 48
Discharge: HOME | End: 2018-07-17

## 2018-07-17 ENCOUNTER — HOSPITAL ENCOUNTER (OUTPATIENT)
Dept: GENERAL RADIOLOGY | Facility: HOSPITAL | Age: 48
Discharge: HOME | End: 2018-07-17
Admitting: NURSE PRACTITIONER

## 2018-07-17 ENCOUNTER — TRANSCRIBE ORDERS (OUTPATIENT)
Dept: ADMINISTRATIVE | Facility: HOSPITAL | Age: 48
End: 2018-07-17

## 2018-07-17 DIAGNOSIS — M25.519 SHOULDER PAIN, UNSPECIFIED CHRONICITY, UNSPECIFIED LATERALITY: ICD-10-CM

## 2018-07-17 DIAGNOSIS — M54.9 BACK PAIN, UNSPECIFIED BACK LOCATION, UNSPECIFIED BACK PAIN LATERALITY, UNSPECIFIED CHRONICITY: ICD-10-CM

## 2018-07-17 DIAGNOSIS — M25.511 RIGHT SHOULDER PAIN, UNSPECIFIED CHRONICITY: ICD-10-CM

## 2018-07-17 DIAGNOSIS — M54.2 NECK PAIN: ICD-10-CM

## 2018-07-17 DIAGNOSIS — M25.511 RIGHT SHOULDER PAIN, UNSPECIFIED CHRONICITY: Primary | ICD-10-CM

## 2018-07-17 PROCEDURE — 72072 X-RAY EXAM THORAC SPINE 3VWS: CPT

## 2018-07-17 PROCEDURE — 71046 X-RAY EXAM CHEST 2 VIEWS: CPT

## 2018-07-17 PROCEDURE — 72110 X-RAY EXAM L-2 SPINE 4/>VWS: CPT | Performed by: RADIOLOGY

## 2018-07-17 PROCEDURE — 72050 X-RAY EXAM NECK SPINE 4/5VWS: CPT

## 2018-07-17 PROCEDURE — 73030 X-RAY EXAM OF SHOULDER: CPT

## 2018-07-17 PROCEDURE — 72050 X-RAY EXAM NECK SPINE 4/5VWS: CPT | Performed by: RADIOLOGY

## 2018-07-17 PROCEDURE — 71046 X-RAY EXAM CHEST 2 VIEWS: CPT | Performed by: RADIOLOGY

## 2018-07-17 PROCEDURE — 72110 X-RAY EXAM L-2 SPINE 4/>VWS: CPT

## 2018-07-17 PROCEDURE — 72072 X-RAY EXAM THORAC SPINE 3VWS: CPT | Performed by: RADIOLOGY

## 2018-07-17 PROCEDURE — 73030 X-RAY EXAM OF SHOULDER: CPT | Performed by: RADIOLOGY

## 2018-07-17 PROCEDURE — 73522 X-RAY EXAM HIPS BI 3-4 VIEWS: CPT | Performed by: RADIOLOGY

## 2018-07-17 PROCEDURE — 73522 X-RAY EXAM HIPS BI 3-4 VIEWS: CPT

## 2018-08-29 ENCOUNTER — OFFICE VISIT (OUTPATIENT)
Dept: BARIATRICS/WEIGHT MGMT | Facility: CLINIC | Age: 48
End: 2018-08-29

## 2018-08-29 VITALS
HEART RATE: 57 BPM | TEMPERATURE: 98.2 F | SYSTOLIC BLOOD PRESSURE: 120 MMHG | WEIGHT: 210.51 LBS | HEIGHT: 62 IN | RESPIRATION RATE: 18 BRPM | OXYGEN SATURATION: 99 % | BODY MASS INDEX: 38.74 KG/M2 | DIASTOLIC BLOOD PRESSURE: 70 MMHG

## 2018-08-29 DIAGNOSIS — K91.2 POSTGASTRECTOMY MALABSORPTION: ICD-10-CM

## 2018-08-29 DIAGNOSIS — E66.9 OBESITY, CLASS II, BMI 35-39.9: ICD-10-CM

## 2018-08-29 DIAGNOSIS — E55.9 HYPOVITAMINOSIS D: ICD-10-CM

## 2018-08-29 DIAGNOSIS — R53.83 FATIGUE, UNSPECIFIED TYPE: ICD-10-CM

## 2018-08-29 DIAGNOSIS — Z98.84 STATUS POST BARIATRIC SURGERY: Primary | ICD-10-CM

## 2018-08-29 DIAGNOSIS — Z13.0 SCREENING, IRON DEFICIENCY ANEMIA: ICD-10-CM

## 2018-08-29 DIAGNOSIS — Z90.3 POSTGASTRECTOMY MALABSORPTION: ICD-10-CM

## 2018-08-29 DIAGNOSIS — Z13.21 MALNUTRITION SCREEN: ICD-10-CM

## 2018-08-29 PROCEDURE — 99214 OFFICE O/P EST MOD 30 MIN: CPT | Performed by: PHYSICIAN ASSISTANT

## 2018-08-29 NOTE — PROGRESS NOTES
[facility] Bariatric Surgery  [address]  [facility] [id number] [phone]        Patient Name:  Shaun Morales.  :  1970      Date of Visit: 2018      Reason for Visit:   6 months postop      HPI: Shaun Morales is a 48 y.o. female s/p LSG/para HHR by Dr. Mckeon 18    Doing well. Pleaed with progress. Has a lot of stress with taking in the child she raised's 2yo. Reflux controlled on BID PPI.  Dysphagia has resolved.    Had what she thinks was a virus the other day, vomited one time only.  Denies dysphagia, nausea and abdominal pain.  Tolerating full diet.  Eating 3 meals a day + protein bar and nuts.  Getting 100g prot/day, core power drink is only one she enjoys.    Drinking 64+ fluid oz/day,  Mostly unsweet tea + water.  3 month labs revealed low Vit D, advised weekly D .  Taking MVI and Vit D weekly. Doesn't remember to take others. On Omeprazole .  Exercising when she can. Starting to get new hair growth.      Presurgery weight: 279 pounds.  Today's weight is 95.5 kg (210 lb 8.2 oz) pounds, today's  Body mass index is 38.5 kg/m²., and her weight loss since surgery is 69 pounds.      Past Medical History:   Diagnosis Date   • Anxiety    • Asthma     mild   • Back pain    • Depression    • Environmental allergies     pollen, pollution   • Family history of pseudocholinesterase deficiency     sister, dad had it.  Unsure if she has it, but reports has never had succinylcholine   • Fatigue    • GERD (gastroesophageal reflux disease)     rarely, associated with onions and red sauces, avoids both.  PRN Tums, EGD with Dr. Rich 2017, op report reviewed, no HH. urease neg. serum h. pyl neg   • H. pylori infection     symptoms of abodminal pain/dyspepsia, tx   • History of MRSA infection     UTI WITH REPORRTED 3-4 CLEAN CATCH WWITH NO mrSA    • Hyperlipidemia    • Hypertension    • IBS (irritable bowel syndrome)     constipation   • Interstitial cystitis      "recent procedure to \"stretch\" the bladder, feels better; has pain in bladder as primary symptom, dyspareunia   • Mitral valve regurgitation     better now, has no symptoms, + hear murmur   • Mood disorder (CMS/[facility])     no formal dx of bipolar disorder, but after bad divorce took lithium   • Nausea     car sickness, has PRN Zofran, motion sickness, previously took meclizine   • Obstructive sleep apnea on CPAP     CPAP compliant setting 11   • Peripheral edema    • PONV (postoperative nausea and vomiting)    • Prediabetes    • Psoriasis    • Psoriatic arthritis (CMS/[facility])    • Trauma of chest      moving trailer, fell on her, several cracked ribs on left, feels it caused her umbo hernia recurrence.  had to be dug out.  Exhusband fx pelvis, mult surgeries.   • Vitamin D deficiency      Past Surgical History:   Procedure Laterality Date   • APPENDECTOMY     • CARDIAC CATHETERIZATION      done after discovery of MVP, also had a CHERISE, normal per patient   •  SECTION     • COLONOSCOPY     • CYSTOSCOPY BLADDER HYDRODISTENSION N/A 2017    Procedure: CYSTOSCOPY BLADDER HYDRODISTENSION;  Surgeon: Ion Herbert MD;  [facility]  [facility];  Service:    • DIAGNOSTIC LAPAROSCOPY N/A 10/14/2016    Procedure: DIAGNOSTIC LAPAROSCOPY POSSIBLE RIGHT SALPINGOOPHORECTOMY;  Surgeon: Rory Owens DO;  [facility]  [facility];  Service:    • ENDOSCOPY      Dr. Rich   • ENDOSCOPY N/A 2018    Procedure: ESOPHAGOGASTRODUODENOSCOPY;  Surgeon: Aneesh Mkceon MD;  Location:  [facility];  Service:    • FOOT SURGERY     • GASTRIC SLEEVE LAPAROSCOPIC N/A 2018    Procedure: GASTRIC SLEEVE LAPAROSCOPIC;  Surgeon: Aneesh Mckeon MD;  Location:  [facility];  Service:    • HYSTERECTOMY  , 2016    laparoscopy supra-cervical hysterectomy , 2016 cervix and 1 ovary removed.  Remaining ovary has a cyst. initially done for pelvic pain/fibroids   • LAPAROSCOPIC APPENDECTOMY     • " "PARAESOPHAGEAL HERNIA REPAIR N/A 2/23/2018    Procedure: PARAESOPHAGEAL HERNIA REPAIR LAPAROSCOPIC;  Surgeon: Aneesh Mckeon MD;  Location:  [facility];  Service:    • SKIN BIOPSY     • STOMACH SURGERY      Gastric Sleeve    • TRACHELECTOMY N/A 10/14/2016    Procedure: TRACHELECTOMY VAGINAL;  Surgeon: Rory Owens DO;  [facility]  [facility];  Service:    • TUBAL ABDOMINAL LIGATION  2000    LEFT OVARY REMAINS   • UMBILICAL HERNIA REPAIR N/A 10/14/2016    Procedure: UMBILICAL HERNIA REPAIR;  Surgeon: Spike Porter MD;  [facility]  [facility];  Service:    • UMBILICAL HERNIA REPAIR N/A 12/9/2016    Procedure: UMBILICAL HERNIA REPAIR;  Surgeon: Spike Porter MD;  [facility]  [facility];  with mesh, supraumbilical   • WISDOM TOOTH EXTRACTION  2000     Outpatient Prescriptions Marked as Taking for the 8/29/18 encounter (Office Visit) with Valerie Ceballos PA-C   Medication Sig Dispense Refill   • cetirizine (ZyrTEC) 10 MG tablet Take 10 mg by mouth daily     • montelukast (SINGULAIR) 10 MG tablet Take 10 mg by mouth Daily.     • omeprazole (priLOSEC) 40 MG capsule Take 1 capsule by mouth 2 (Two) Times a Day. 60 capsule 2   • vitamin D (ERGOCALCIFEROL) 13783 units capsule capsule Take 1 capsule by mouth 1 (One) Time Per Week. 12 capsule 0       Allergies   Allergen Reactions   • Amlodipine GI Intolerance and Arrhythmia   • Morphine And Related Itching     Dilaudid ok, percocet/lortab ok   • Nifedipine GI Intolerance and Arrhythmia     Adalat, procardia   • Nsaids Unknown (See Comments)     Pt states she cannot take NSAIDs for a peroid of time after having her Gastric Sleeve Surgery    • Other Other (See Comments)     Pt states she cannot taken Steroids for some time after Gastric sleeve operation    • Adhesive Tape Rash     Can tolerate steristrips and skin glue   • Chlorhexidine Rash   • Diazepam Anxiety     \"reverse effect,\" \"makes me absolutely crazy\"   • Midazolam Anxiety   • Sulfa Antibiotics Rash " "      Social History     Social History   • Marital status:      Spouse name: N/A   • Number of children: N/A   • Years of education: N/A     Occupational History   • Not on file.     Social History Main Topics   • Smoking status: Former Smoker     Packs/day: 0.50     Years: 10.00     Types: Cigarettes     Quit date: 1/1/2013   • Smokeless tobacco: Never Used      Comment: Is around secondhand smoke occasionally in car but not in house.  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         /70 (BP Location: Left arm, Patient Position: Sitting, Cuff Size: Large Adult)   Pulse 57   Temp 98.2 °F (36.8 °C) (Temporal Artery )   Resp 18   Ht 157.5 cm (62\")   Wt 95.5 kg (210 lb 8.2 oz)   LMP  (LMP Unknown) Comment: hysterectomy  SpO2 99%   BMI 38.50 kg/m²     Physical Exam   Constitutional: She is oriented to person, place, and time. She appears well-developed and well-nourished.   HENT:   Head: Normocephalic and atraumatic.   Cardiovascular: Normal rate, regular rhythm and normal heart sounds.    Pulmonary/Chest: Effort normal and breath sounds normal. No respiratory distress. She has no wheezes.   Abdominal: Soft. Bowel sounds are normal. She exhibits no distension. There is no tenderness.   Incisions well healed.    Neurological: She is alert and oriented to person, place, and time.   Skin: Skin is warm and dry.   Psychiatric: She has a normal mood and affect. Her behavior is normal. Judgment and thought content normal.         Assessment:  6 months s/p LSG/para HHR by Dr. Mckeon 2/23/18    ICD-10-CM ICD-9-CM   1. Status post bariatric surgery Z98.84 V45.86   2. Fatigue, unspecified type R53.83 780.79   3. Hypovitaminosis D E55.9 268.9   4. Screening, iron deficiency anemia Z13.0 V78.0   5. Malnutrition " screen Z13.21 V77.2   6. Postgastrectomy malabsorption K91.2 579.3    Z90.3    7. Obesity, Class II, BMI 35-39.9 E66.9 278.00         Plan:  Continue w/ good food choices and healthy habits.  Continue to focus on high protein, low carb.  Keep tracking intake.  Continue routine exercise. Cont PPI.  Routine bariatric labs ordered.  Continue vitamins w/ adjustments pending lab results.  Call w/ problems/concerns.     The patient was instructed to follow up in 3 months, sooner if needed.      Total time spent w/ patient 25 minutes and 15 minutes spent counseling the patient on nutrition and necessary dietary/lifestyle modifications.

## 2018-09-05 RX ORDER — ERGOCALCIFEROL 1.25 MG/1
50000 CAPSULE ORAL 2 TIMES WEEKLY
Qty: 24 CAPSULE | Refills: 0 | Status: SHIPPED | OUTPATIENT
Start: 2018-09-06 | End: 2018-11-29 | Stop reason: SDUPTHER

## 2018-10-19 ENCOUNTER — APPOINTMENT (OUTPATIENT)
Dept: CT IMAGING | Facility: HOSPITAL | Age: 48
End: 2018-10-19

## 2018-10-19 ENCOUNTER — HOSPITAL ENCOUNTER (EMERGENCY)
Facility: HOSPITAL | Age: 48
Discharge: HOME | End: 2018-10-19
Attending: FAMILY MEDICINE

## 2018-10-19 VITALS
RESPIRATION RATE: 18 BRPM | TEMPERATURE: 98.5 F | HEART RATE: 75 BPM | DIASTOLIC BLOOD PRESSURE: 93 MMHG | HEIGHT: 62 IN | SYSTOLIC BLOOD PRESSURE: 146 MMHG | WEIGHT: 198 LBS | OXYGEN SATURATION: 100 % | BODY MASS INDEX: 36.44 KG/M2

## 2018-10-19 DIAGNOSIS — R55 SYNCOPE, UNSPECIFIED SYNCOPE TYPE: Primary | ICD-10-CM

## 2018-10-19 LAB
ALBUMIN SERPL-MCNC: 4.3 G/DL (ref 3.5–5)
ALBUMIN/GLOB SERPL: 1.5 G/DL (ref 1.5–2.5)
ALP SERPL-CCNC: 82 U/L (ref 35–104)
ALT SERPL W P-5'-P-CCNC: 9 U/L (ref 10–36)
ANION GAP SERPL CALCULATED.3IONS-SCNC: 4.1 MMOL/L (ref 3.6–11.2)
AST SERPL-CCNC: 14 U/L (ref 10–30)
BASOPHILS # BLD AUTO: 0.02 10*3/MM3 (ref 0–0.3)
BASOPHILS NFR BLD AUTO: 0.2 % (ref 0–2)
BILIRUB SERPL-MCNC: 0.3 MG/DL (ref 0.2–1.8)
BILIRUB UR QL STRIP: NEGATIVE
BUN BLD-MCNC: 13 MG/DL (ref 7–21)
BUN/CREAT SERPL: 20.3 (ref 7–25)
CALCIUM SPEC-SCNC: 8.8 MG/DL (ref 7.7–10)
CHLORIDE SERPL-SCNC: 110 MMOL/L (ref 99–112)
CLARITY UR: CLEAR
CO2 SERPL-SCNC: 24.9 MMOL/L (ref 24.3–31.9)
COLOR UR: YELLOW
CREAT BLD-MCNC: 0.64 MG/DL (ref 0.43–1.29)
D DIMER PPP FEU-MCNC: <0.27 MCGFEU/ML (ref 0–0.5)
DEPRECATED RDW RBC AUTO: 43.7 FL (ref 37–54)
EOSINOPHIL # BLD AUTO: 0.4 10*3/MM3 (ref 0–0.7)
EOSINOPHIL NFR BLD AUTO: 5 % (ref 0–5)
ERYTHROCYTE [DISTWIDTH] IN BLOOD BY AUTOMATED COUNT: 13.3 % (ref 11.5–14.5)
GFR SERPL CREATININE-BSD FRML MDRD: 100 ML/MIN/1.73
GLOBULIN UR ELPH-MCNC: 2.9 GM/DL
GLUCOSE BLD-MCNC: 95 MG/DL (ref 70–110)
GLUCOSE BLDC GLUCOMTR-MCNC: 92 MG/DL (ref 70–130)
GLUCOSE UR STRIP-MCNC: NEGATIVE MG/DL
HCG SERPL QL: NEGATIVE
HCT VFR BLD AUTO: 41 % (ref 37–47)
HGB BLD-MCNC: 13.6 G/DL (ref 12–16)
HGB UR QL STRIP.AUTO: NEGATIVE
HOLD SPECIMEN: NORMAL
HOLD SPECIMEN: NORMAL
IMM GRANULOCYTES # BLD: 0.01 10*3/MM3 (ref 0–0.03)
IMM GRANULOCYTES NFR BLD: 0.1 % (ref 0–0.5)
KETONES UR QL STRIP: ABNORMAL
LEUKOCYTE ESTERASE UR QL STRIP.AUTO: NEGATIVE
LYMPHOCYTES # BLD AUTO: 3.23 10*3/MM3 (ref 1–3)
LYMPHOCYTES NFR BLD AUTO: 40 % (ref 21–51)
MAGNESIUM SERPL-MCNC: 2.2 MG/DL (ref 1.7–2.6)
MCH RBC QN AUTO: 30.3 PG (ref 27–33)
MCHC RBC AUTO-ENTMCNC: 33.2 G/DL (ref 33–37)
MCV RBC AUTO: 91.3 FL (ref 80–94)
MONOCYTES # BLD AUTO: 0.61 10*3/MM3 (ref 0.1–0.9)
MONOCYTES NFR BLD AUTO: 7.6 % (ref 0–10)
NEUTROPHILS # BLD AUTO: 3.8 10*3/MM3 (ref 1.4–6.5)
NEUTROPHILS NFR BLD AUTO: 47.1 % (ref 30–70)
NITRITE UR QL STRIP: NEGATIVE
OSMOLALITY SERPL CALC.SUM OF ELEC: 277.5 MOSM/KG (ref 273–305)
PH UR STRIP.AUTO: 7 [PH] (ref 5–8)
PLATELET # BLD AUTO: 284 10*3/MM3 (ref 130–400)
PMV BLD AUTO: 12.1 FL (ref 6–10)
POTASSIUM BLD-SCNC: 4 MMOL/L (ref 3.5–5.3)
PROT SERPL-MCNC: 7.2 G/DL (ref 6–8)
PROT UR QL STRIP: ABNORMAL
RBC # BLD AUTO: 4.49 10*6/MM3 (ref 4.2–5.4)
SODIUM BLD-SCNC: 139 MMOL/L (ref 135–153)
SP GR UR STRIP: 1.03 (ref 1–1.03)
TROPONIN I SERPL-MCNC: <0.006 NG/ML
UROBILINOGEN UR QL STRIP: ABNORMAL
WBC NRBC COR # BLD: 8.07 10*3/MM3 (ref 4.5–12.5)
WHOLE BLOOD HOLD SPECIMEN: NORMAL
WHOLE BLOOD HOLD SPECIMEN: NORMAL

## 2018-10-19 PROCEDURE — 70450 CT HEAD/BRAIN W/O DYE: CPT | Performed by: RADIOLOGY

## 2018-10-19 PROCEDURE — 81003 URINALYSIS AUTO W/O SCOPE: CPT | Performed by: FAMILY MEDICINE

## 2018-10-19 PROCEDURE — 93005 ELECTROCARDIOGRAM TRACING: CPT | Performed by: EMERGENCY MEDICINE

## 2018-10-19 PROCEDURE — 85379 FIBRIN DEGRADATION QUANT: CPT | Performed by: FAMILY MEDICINE

## 2018-10-19 PROCEDURE — 93005 ELECTROCARDIOGRAM TRACING: CPT | Performed by: FAMILY MEDICINE

## 2018-10-19 PROCEDURE — 84703 CHORIONIC GONADOTROPIN ASSAY: CPT | Performed by: FAMILY MEDICINE

## 2018-10-19 PROCEDURE — 70450 CT HEAD/BRAIN W/O DYE: CPT

## 2018-10-19 PROCEDURE — 82962 GLUCOSE BLOOD TEST: CPT

## 2018-10-19 PROCEDURE — 85025 COMPLETE CBC W/AUTO DIFF WBC: CPT | Performed by: EMERGENCY MEDICINE

## 2018-10-19 PROCEDURE — 83735 ASSAY OF MAGNESIUM: CPT | Performed by: FAMILY MEDICINE

## 2018-10-19 PROCEDURE — 84484 ASSAY OF TROPONIN QUANT: CPT | Performed by: FAMILY MEDICINE

## 2018-10-19 PROCEDURE — 93010 ELECTROCARDIOGRAM REPORT: CPT | Performed by: INTERNAL MEDICINE

## 2018-10-19 PROCEDURE — 99284 EMERGENCY DEPT VISIT MOD MDM: CPT

## 2018-10-19 PROCEDURE — 80053 COMPREHEN METABOLIC PANEL: CPT | Performed by: FAMILY MEDICINE

## 2018-10-19 RX ORDER — SODIUM CHLORIDE 0.9 % (FLUSH) 0.9 %
10 SYRINGE (ML) INJECTION AS NEEDED
Status: DISCONTINUED | OUTPATIENT
Start: 2018-10-19 | End: 2018-10-19 | Stop reason: HOSPADM

## 2018-10-19 RX ADMIN — SODIUM CHLORIDE 1000 ML: 9 INJECTION, SOLUTION INTRAVENOUS at 14:34

## 2018-10-19 NOTE — ED NOTES
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  Reports that she still does not feel well.  Pt family at bedside.     Gely Glez RN  10/19/18 1108

## 2018-10-19 NOTE — ED NOTES
Pt resting quietly on stretcher with no complaints.  Pt AAOx4 with no resp distress noted, respirations even and unlabored.  Pt denies any needs at this time.  Skin PWD.  Pt family at bedside. Will continue to monitor and follow plan of care.  Bed rails up x2, bed in lowest position, call light in reach.         Gely Glez, RN  10/19/18 8490

## 2018-10-19 NOTE — ED PROVIDER NOTES
Subjective   History of Present Illness  49 y/o F here after reported syncopal episode that occurred several hours PTP. Pt states she was cleaning her house when she had syncopal episode. Pt states she awoke lying on the ground immediately after it occurred. Pt denies any CP/palpitations surrounding the event. Pt states she was at her mental baseline immediately upon waking. No SOB/fever/chills. Pt has known heart murmur that is reportedly stable. Pt reports recent sleeve gastrectomy but states she follows with providers regularly and everything is going well. Per records, pt last had stress test and ECHO in 2017, EF 65%.  Review of Systems   Constitutional: Negative for chills, fatigue and fever.   Eyes: Negative for photophobia and visual disturbance.   Respiratory: Negative for cough, chest tightness, shortness of breath and wheezing.    Cardiovascular: Negative for chest pain, palpitations and leg swelling.   Gastrointestinal: Negative for abdominal distention, abdominal pain, constipation, diarrhea, nausea and vomiting.   Genitourinary: Negative for difficulty urinating and dysuria.   Musculoskeletal: Negative for back pain and neck pain.   Skin: Negative for color change and pallor.   Neurological: Positive for syncope. Negative for dizziness, tremors, seizures, facial asymmetry, speech difficulty, weakness, light-headedness, numbness and headaches.   Hematological: Does not bruise/bleed easily.   Psychiatric/Behavioral: Negative for confusion.   All other systems reviewed and are negative.      Past Medical History:   Diagnosis Date   • Anxiety    • Asthma     mild   • Back pain    • Depression    • Environmental allergies     pollen, pollution   • Family history of pseudocholinesterase deficiency     sister, dad had it.  Unsure if she has it, but reports has never had succinylcholine   • Fatigue    • GERD (gastroesophageal reflux disease)     rarely, associated with onions and red sauces, avoids both.  PRN  "Tums, EGD with Dr. Rich 2017, op report reviewed, no HH. urease neg. serum h. pyl neg   • H. pylori infection     symptoms of abodminal pain/dyspepsia, tx   • History of MRSA infection 2012    UTI WITH REPORRTED 3-4 CLEAN CATCH WWITH NO mrSA    • Hyperlipidemia    • Hypertension    • IBS (irritable bowel syndrome)     constipation   • Interstitial cystitis     recent procedure to \"stretch\" the bladder, feels better; has pain in bladder as primary symptom, dyspareunia   • Mitral valve regurgitation     better now, has no symptoms, + hear murmur   • Mood disorder (CMS/[facility])     no formal dx of bipolar disorder, but after bad divorce took lithium   • Nausea     car sickness, has PRN Zofran, motion sickness, previously took meclizine   • Obstructive sleep apnea on CPAP     CPAP compliant setting 11   • Peripheral edema    • PONV (postoperative nausea and vomiting)    • Prediabetes    • Psoriasis    • Psoriatic arthritis (CMS/[facility])    • Trauma of chest     2014 moving trailer, fell on her, several cracked ribs on left, feels it caused her umbo hernia recurrence.  had to be dug out.  Exhusband fx pelvis, mult surgeries.   • Vitamin D deficiency        Allergies   Allergen Reactions   • Amlodipine GI Intolerance and Arrhythmia   • Morphine And Related Itching     Dilaudid ok, percocet/lortab ok   • Nifedipine GI Intolerance and Arrhythmia     Adalat, procardia   • Nsaids Unknown (See Comments)     Pt states she cannot take NSAIDs for a peroid of time after having her Gastric Sleeve Surgery    • Other Other (See Comments)     Pt states she cannot taken Steroids for some time after Gastric sleeve operation    • Adhesive Tape Rash     Can tolerate steristrips and skin glue   • Chlorhexidine Rash   • Diazepam Anxiety     \"reverse effect,\" \"makes me absolutely crazy\"   • Midazolam Anxiety   • Sulfa Antibiotics Rash       Past Surgical History:   Procedure Laterality Date   • APPENDECTOMY     • CARDIAC CATHETERIZATION  2007    " done after discovery of MVP, also had a CHERISE, normal per patient   •  SECTION     • COLONOSCOPY     • CYSTOSCOPY BLADDER HYDRODISTENSION N/A 2017    Procedure: CYSTOSCOPY BLADDER HYDRODISTENSION;  Surgeon: Ion Herbert MD;  [facility];  Service:    • DIAGNOSTIC LAPAROSCOPY N/A 10/14/2016    Procedure: DIAGNOSTIC LAPAROSCOPY POSSIBLE RIGHT SALPINGOOPHORECTOMY;  Surgeon: Rory Owens DO;  [facility];  Service:    • ENDOSCOPY      Dr. Rich   • ENDOSCOPY N/A 2018    Procedure: ESOPHAGOGASTRODUODENOSCOPY;  Surgeon: Aneesh Mckeon MD;  Location:  [facility];  Service:    • FOOT SURGERY     • GASTRIC SLEEVE LAPAROSCOPIC N/A 2018    Procedure: GASTRIC SLEEVE LAPAROSCOPIC;  Surgeon: Aneesh Mckeon MD;  Location:  [facility];  Service:    • HYSTERECTOMY  ,     laparoscopy supra-cervical hysterectomy 2016 cervix and 1 ovary removed.  Remaining ovary has a cyst. initially done for pelvic pain/fibroids   • LAPAROSCOPIC APPENDECTOMY     • PARAESOPHAGEAL HERNIA REPAIR N/A 2018    Procedure: PARAESOPHAGEAL HERNIA REPAIR LAPAROSCOPIC;  Surgeon: Aneesh Mckeon MD;  Location:  [facility];  Service:    • SKIN BIOPSY     • STOMACH SURGERY      Gastric Sleeve    • TRACHELECTOMY N/A 10/14/2016    Procedure: TRACHELECTOMY VAGINAL;  Surgeon: Rory Owens DO;  [facility];  Service:    • TUBAL ABDOMINAL LIGATION      LEFT OVARY REMAINS   • UMBILICAL HERNIA REPAIR N/A 10/14/2016    Procedure: UMBILICAL HERNIA REPAIR;  Surgeon: Spike Porter MD;  [facility];  Service:    • UMBILICAL HERNIA REPAIR N/A 2016    Procedure: UMBILICAL HERNIA REPAIR;  Surgeon: Spike Porter MD;  [facility]  [facility];  with mesh, supraumbilical   • WISDOM TOOTH EXTRACTION         Family History   Problem Relation Age of Onset   • Diabetes Mother    • Hypertension Mother    • Stroke Mother    • Heart disease Mother    •  Cancer Father    • Sleep apnea Father    • Diabetes Father    • Anesthesia problems Sister    • Heart attack Maternal Grandmother 70   • Obesity Maternal Grandmother    • Hypertension Maternal Grandmother    • Heart disease Maternal Grandmother    • Sleep apnea Maternal Grandmother    • Stroke Maternal Grandmother    • Heart attack Maternal Grandfather    • Heart disease Maternal Grandfather    • Heart attack Paternal Grandmother 54   • Sleep apnea Paternal Grandmother    • Heart disease Paternal Grandmother    • Hypertension Paternal Grandmother    • Obesity Paternal Grandmother    • Rheum arthritis Neg Hx    • Osteoarthritis Neg Hx    • Asthma Neg Hx    • Heart failure Neg Hx    • Hyperlipidemia Neg Hx    • Migraines Neg Hx    • Rashes / Skin problems Neg Hx    • Seizures Neg Hx    • Thyroid disease Neg Hx    • Breast cancer Neg Hx        Social History     Social History   • Marital status:      Social History Main Topics   • Smoking status: Former Smoker     Packs/day: 0.50     Years: 10.00     Types: Cigarettes     Quit date: 1/1/2013   • Smokeless tobacco: Never Used      Comment: Is around secondhand smoke occasionally in car but not in house.  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             Objective   Physical Exam   Constitutional: She is oriented to person, place, and time. She appears well-developed and well-nourished. She is active.   HENT:   Head: Normocephalic and atraumatic.   Right Ear: Hearing, external ear and ear canal normal.   Left Ear: Hearing, external ear and ear canal normal.   Nose: Nose normal.   Mouth/Throat: Uvula is midline, oropharynx is clear and moist and mucous membranes are normal.   Eyes: Pupils are equal, round, and reactive to light. Conjunctivae, EOM and lids are  normal.   Neck: Trachea normal, normal range of motion, full passive range of motion without pain and phonation normal. Neck supple.   Cardiovascular: Normal rate and regular rhythm.    Murmur heard.  Pulmonary/Chest: Effort normal and breath sounds normal.   Abdominal: Soft. Normal appearance. She exhibits no distension. There is no tenderness. There is no rigidity, no rebound and no guarding.   Neurological: She is alert and oriented to person, place, and time. GCS eye subscore is 4. GCS verbal subscore is 5. GCS motor subscore is 6.   Skin: Skin is warm, dry and intact. Capillary refill takes less than 2 seconds.   Psychiatric: She has a normal mood and affect. Her speech is normal and behavior is normal. Cognition and memory are normal.   Nursing note and vitals reviewed.      Procedures           ED Course  ED Course as of Oct 19 1554   Fri Oct 19, 2018   1552 Sinus rhythm, 57 bpm. QTc 404ms. No ST segment abnormalities concerning for STEMI. No pathologic blocks or dysrhythmias. ECG 12 Lead [BR]      ED Course User Index  [BR] Tiago Ga MD      Pt serum chemistries and imaging all WNL. Pt given referral to Dr Zamorano for outpt f/u.            MDM  Number of Diagnoses or Management Options  Syncope, unspecified syncope type: new and requires workup     Amount and/or Complexity of Data Reviewed  Clinical lab tests: reviewed and ordered  Tests in the radiology section of CPT®: reviewed and ordered  Tests in the medicine section of CPT®: reviewed and ordered  Independent visualization of images, tracings, or specimens: yes    Risk of Complications, Morbidity, and/or Mortality  Presenting problems: high  Diagnostic procedures: high  Management options: high    Patient Progress  Patient progress: stable        Final diagnoses:   Syncope, unspecified syncope type            Tiago Ga MD  10/19/18 2248       Tiago Ga MD  10/19/18 1739

## 2018-10-19 NOTE — ED NOTES
Pt resting quietly on stretcher with no complaints.  Pt AAOx4 with no resp distress noted, respirations even and unlabored.  Pt denies any needs at this time.  Skin PWD.  Pt family at bedside. Will continue to monitor and follow plan of care.  Bed rails up x2, bed in lowest position, call light in reach.       Gely Glez, RN  10/19/18 8380

## 2018-11-29 ENCOUNTER — OFFICE VISIT (OUTPATIENT)
Dept: BARIATRICS/WEIGHT MGMT | Facility: CLINIC | Age: 48
End: 2018-11-29

## 2018-11-29 VITALS
HEART RATE: 66 BPM | BODY MASS INDEX: 35.88 KG/M2 | OXYGEN SATURATION: 99 % | HEIGHT: 62 IN | TEMPERATURE: 97.8 F | SYSTOLIC BLOOD PRESSURE: 118 MMHG | DIASTOLIC BLOOD PRESSURE: 78 MMHG | WEIGHT: 195 LBS | RESPIRATION RATE: 18 BRPM

## 2018-11-29 DIAGNOSIS — K91.2 POSTGASTRECTOMY MALABSORPTION: ICD-10-CM

## 2018-11-29 DIAGNOSIS — Z13.21 MALNUTRITION SCREEN: ICD-10-CM

## 2018-11-29 DIAGNOSIS — E66.9 OBESITY, CLASS II, BMI 35-39.9: ICD-10-CM

## 2018-11-29 DIAGNOSIS — Z13.0 SCREENING, IRON DEFICIENCY ANEMIA: ICD-10-CM

## 2018-11-29 DIAGNOSIS — Z90.3 POSTGASTRECTOMY MALABSORPTION: ICD-10-CM

## 2018-11-29 DIAGNOSIS — K21.9 GASTROESOPHAGEAL REFLUX DISEASE, ESOPHAGITIS PRESENCE NOT SPECIFIED: ICD-10-CM

## 2018-11-29 DIAGNOSIS — E55.9 HYPOVITAMINOSIS D: ICD-10-CM

## 2018-11-29 DIAGNOSIS — Z98.84 STATUS POST BARIATRIC SURGERY: Primary | ICD-10-CM

## 2018-11-29 PROCEDURE — 99214 OFFICE O/P EST MOD 30 MIN: CPT | Performed by: PHYSICIAN ASSISTANT

## 2018-11-29 RX ORDER — LISINOPRIL 10 MG/1
10 TABLET ORAL DAILY
COMMUNITY
End: 2019-04-08

## 2018-11-29 NOTE — PROGRESS NOTES
[facility] Bariatric Surgery  [address]  [facility] [id number] [phone]        Patient Name:  Shaun Morales.  :  1970      Date of Visit: 2018      Reason for Visit:   9 months postop      HPI: Shaun Morales is a 48 y.o. female s/p LSG/para HHR by Dr. Mckeon 18    Doing well.  No issues/concerns. Pleased with weightloss, has slowed a bit. Noticed she lost more when eating a lot over a few day span with baby shower festivities.  Reflux controlled with omeprazole 40mg BID, she doesn't always remember to take it and has occasional symptoms with forgetting meds. Denies dysphagia, nausea, vomiting, abdominal pain, pulmonary issues and fevers.  Getting 100+g prot/day.  Eating 3 meals a day + snack.  Breakfast: egg + sausage. Lunch/ Dinner: burger/ tomato/ lettace wrap, meat + veggies.  Snacks: peanuts,  Drinking 64+ fluid oz/day, water + diet tea.  3 month labs revealed VIt D low, advised twice weekly 50,000 IU .  Taking MVI (prenatal), taking Vit D twice a week.  On Omeprazole .  Exercising- walking, started working fulltime at country store, long longer has the 4yo living with her.      Presurgery weight: 279 pounds.  Today's weight is 88.5 kg (195 lb) pounds, today's  Body mass index is 35.67 kg/m²., and her weight loss since surgery is 84 pounds.      Past Medical History:   Diagnosis Date   • Anxiety    • Asthma     mild   • Back pain    • Depression    • Environmental allergies     pollen, pollution   • Family history of pseudocholinesterase deficiency     sister, dad had it.  Unsure if she has it, but reports has never had succinylcholine   • Fatigue    • GERD (gastroesophageal reflux disease)     rarely, associated with onions and red sauces, avoids both.  PRN Tums, EGD with Dr. Rich 2017, op report reviewed, no HH. urease neg. serum h. pyl neg   • H. pylori infection     symptoms of abodminal pain/dyspepsia, tx   • History of MRSA infection      "UTI WITH REPORRTED 3-4 CLEAN CATCH WWITH NO mrSA    • Hyperlipidemia    • Hypertension    • IBS (irritable bowel syndrome)     constipation   • Interstitial cystitis     recent procedure to \"stretch\" the bladder, feels better; has pain in bladder as primary symptom, dyspareunia   • Mitral valve regurgitation     better now, has no symptoms, + hear murmur   • Mood disorder (CMS/[facility])     no formal dx of bipolar disorder, but after bad divorce took lithium   • Nausea     car sickness, has PRN Zofran, motion sickness, previously took meclizine   • Obstructive sleep apnea on CPAP     CPAP compliant setting 11   • Peripheral edema    • PONV (postoperative nausea and vomiting)    • Prediabetes    • Psoriasis    • Psoriatic arthritis (CMS/[facility])    • Trauma of chest      moving trailer, fell on her, several cracked ribs on left, feels it caused her umbo hernia recurrence.  had to be dug out.  Exhusband fx pelvis, mult surgeries.   • Vitamin D deficiency      Past Surgical History:   Procedure Laterality Date   • APPENDECTOMY     • CARDIAC CATHETERIZATION      done after discovery of MVP, also had a CHERISE, normal per patient   •  SECTION     • COLONOSCOPY     • CYSTOSCOPY BLADDER HYDRODISTENSION N/A 2017    Procedure: CYSTOSCOPY BLADDER HYDRODISTENSION;  Surgeon: Ion Herbert MD;  Location: [facility] OR;  Service:    • CYSTOSCOPY BLADDER HYDRODISTENSION N/A 2017    Performed by Ion Herbert MD at [facility] OR   • DIAGNOSTIC LAPAROSCOPY N/A 10/14/2016    Procedure: DIAGNOSTIC LAPAROSCOPY POSSIBLE RIGHT SALPINGOOPHORECTOMY;  Surgeon: Rory Owens DO;  Location: [facility] OR;  Service:    • DIAGNOSTIC LAPAROSCOPY POSSIBLE RIGHT SALPINGOOPHORECTOMY N/A 10/14/2016    Performed by Rory Owens DO at [facility] OR   • ENDOSCOPY      Dr. Rich   • ENDOSCOPY N/A 2018    Procedure: ESOPHAGOGASTRODUODENOSCOPY;  Surgeon: Aneesh Mckeon MD;  Location: [facility] OR;  " Service:    • ESOPHAGOGASTRODUODENOSCOPY N/A 2/23/2018    Performed by Aneesh Mckeon MD at  [facility] OR   • FOOT SURGERY  1984   • GASTRIC SLEEVE LAPAROSCOPIC N/A 2/23/2018    Procedure: GASTRIC SLEEVE LAPAROSCOPIC;  Surgeon: Aneesh Mckeon MD;  Location:  [facility] OR;  Service:    • GASTRIC SLEEVE LAPAROSCOPIC N/A 2/23/2018    Performed by Aneesh Mckeon MD at  [facility] OR   • HYSTERECTOMY  2008, 2016    laparoscopy supra-cervical hysterectomy 2008, 2016 cervix and 1 ovary removed.  Remaining ovary has a cyst. initially done for pelvic pain/fibroids   • LAPAROSCOPIC APPENDECTOMY  2015   • PARAESOPHAGEAL HERNIA REPAIR N/A 2/23/2018    Procedure: PARAESOPHAGEAL HERNIA REPAIR LAPAROSCOPIC;  Surgeon: Aneesh Mckeon MD;  Location:  [facility] OR;  Service:    • PARAESOPHAGEAL HERNIA REPAIR LAPAROSCOPIC N/A 2/23/2018    Performed by Aneesh Mckeon MD at  [facility] OR   • SKIN BIOPSY     • STOMACH SURGERY      Gastric Sleeve    • TRACHELECTOMY N/A 10/14/2016    Procedure: TRACHELECTOMY VAGINAL;  Surgeon: Rory Owens DO;  Location:  [facility] OR;  Service:    • TRACHELECTOMY VAGINAL N/A 10/14/2016    Performed by [facility] DO at  [facility] OR   • TUBAL ABDOMINAL LIGATION  2000    LEFT OVARY REMAINS   • UMBILICAL HERNIA REPAIR N/A 10/14/2016    Procedure: UMBILICAL HERNIA REPAIR;  Surgeon: Spike Porter MD;  Location:  [facility] OR;  Service:    • UMBILICAL HERNIA REPAIR N/A 12/9/2016    Procedure: UMBILICAL HERNIA REPAIR;  Surgeon: Spike Porter MD;  Location:  [facility] OR;  with mesh, supraumbilical   • UMBILICAL HERNIA REPAIR N/A 12/9/2016    Performed by S[facility] Porter MD at  [facility] OR   • UMBILICAL HERNIA REPAIR N/A 10/14/2016    Performed by Spike Porter MD at  [facility] OR   • WISDOM TOOTH EXTRACTION  2000     Outpatient Medications Marked as Taking for the 11/29/18 encounter (Office Visit) with Valerie Ceballos PA-C   Medication Sig Dispense Refill   • cetirizine (ZyrTEC) 10 MG tablet Take  "10 mg by mouth daily     • lisinopril (PRINIVIL,ZESTRIL) 10 MG tablet Take 10 mg by mouth Daily.     • montelukast (SINGULAIR) 10 MG tablet Take 10 mg by mouth Daily.     • omeprazole (priLOSEC) 40 MG capsule Take 1 capsule by mouth 2 (Two) Times a Day. 60 capsule 2   • vitamin D (ERGOCALCIFEROL) 80817 units capsule capsule Take 1 capsule by mouth 1 (One) Time Per Week. 12 capsule 0       Allergies   Allergen Reactions   • Amlodipine GI Intolerance and Arrhythmia   • Morphine And Related Itching     Dilaudid ok, percocet/lortab ok   • Nifedipine GI Intolerance and Arrhythmia     Adalat, procardia   • Nsaids Unknown (See Comments)     Pt states she cannot take NSAIDs for a peroid of time after having her Gastric Sleeve Surgery    • Other Other (See Comments)     Pt states she cannot taken Steroids for some time after Gastric sleeve operation    • Adhesive Tape Rash     Can tolerate steristrips and skin glue   • Chlorhexidine Rash   • Diazepam Anxiety     \"reverse effect,\" \"makes me absolutely crazy\"   • Midazolam Anxiety   • Sulfa Antibiotics Rash       Social History     Socioeconomic History   • Marital status:      Spouse name: Not on file   • Number of children: Not on file   • Years of education: Not on file   • Highest education level: Not on file   Social Needs   • Financial resource strain: Not on file   • Food insecurity - worry: Not on file   • Food insecurity - inability: Not on file   • Transportation needs - medical: Not on file   • Transportation needs - non-medical: Not on file   Occupational History   • Not on file   Tobacco Use   • Smoking status: Former Smoker     Packs/day: 0.50     Years: 10.00     Pack years: 5.00     Types: Cigarettes     Last attempt to quit: 2013     Years since quittin.9   • Smokeless tobacco: Never Used   • Tobacco comment: Is around secondhand smoke occasionally in car but not in house.  smokes - not in house or cars   Substance and Sexual Activity   • " "Alcohol use: No     Comment: on occassion socially   • Drug use: No   • Sexual activity: Defer     Birth control/protection: Surgical   Other Topics Concern   • Not on file   Social History 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         /78 (BP Location: Left arm, Patient Position: Sitting, Cuff Size: Large Adult)   Pulse 66   Temp 97.8 °F (36.6 °C) (Temporal)   Resp 18   Ht 157.5 cm (62\")   Wt 88.5 kg (195 lb)   LMP  (LMP Unknown) Comment: hysterectomy  SpO2 99%   BMI 35.67 kg/m²     Physical Exam   Constitutional: She is oriented to person, place, and time. She appears well-developed and well-nourished.   HENT:   Head: Normocephalic and atraumatic.   Cardiovascular: Normal rate, regular rhythm and normal heart sounds.   Pulmonary/Chest: Effort normal and breath sounds normal. No stridor. No respiratory distress.   Abdominal: Soft. Bowel sounds are normal. She exhibits no distension. There is no tenderness.   Incisions well healed   Neurological: She is alert and oriented to person, place, and time.   Skin: Skin is warm and dry.   Psychiatric: She has a normal mood and affect. Her behavior is normal. Judgment and thought content normal.         Assessment:  9 months s/p LSG/para HHR by Dr. Mckeon 2/23/18    ICD-10-CM ICD-9-CM   1. Status post bariatric surgery Z98.84 V45.86   2. Hypovitaminosis D E55.9 268.9   3. Screening, iron deficiency anemia Z13.0 V78.0   4. Malnutrition screen Z13.21 V77.2   5. Postgastrectomy malabsorption K91.2 579.3    Z90.3    6. Obesity, Class II, BMI 35-39.9 E66.9 278.00   7. Gastroesophageal reflux disease, esophagitis presence not specified K21.9 530.81         Plan:  Doing well. Continue w/ good food choices and healthy habits,  Target 1300 calories.  Continue to focus on high protein, low carb.  Keep tracking intake.  Encouraged routine exercise.  Routine bariatric labs ordered.  Continue vitamins w/ adjustments pending lab results.  Call w/ " problems/concerns.     The patient was instructed to follow up in 3 months, sooner if needed.      Total time spent w/ patient 25 minutes and 15 minutes spent counseling the patient on nutrition and necessary dietary/lifestyle modifications.

## 2018-12-05 LAB
25(OH)D3+25(OH)D2 SERPL-MCNC: 23.1 NG/ML
ALBUMIN SERPL-MCNC: 4.05 G/DL (ref 3.2–4.8)
ALBUMIN/GLOB SERPL: 1.9 G/DL (ref 1.5–2.5)
ALP SERPL-CCNC: 78 U/L (ref 25–100)
ALT SERPL-CCNC: 11 U/L (ref 7–40)
AST SERPL-CCNC: 10 U/L (ref 0–33)
BASOPHILS # BLD AUTO: 0.02 10*3/MM3 (ref 0–0.2)
BASOPHILS NFR BLD AUTO: 0.3 % (ref 0–1)
BILIRUB SERPL-MCNC: 0.4 MG/DL (ref 0.3–1.2)
BUN SERPL-MCNC: 13 MG/DL (ref 9–23)
BUN/CREAT SERPL: 21.7 (ref 7–25)
CALCIUM SERPL-MCNC: 8.7 MG/DL (ref 8.7–10.4)
CHLORIDE SERPL-SCNC: 104 MMOL/L (ref 99–109)
CO2 SERPL-SCNC: 26 MMOL/L (ref 20–31)
CREAT SERPL-MCNC: 0.6 MG/DL (ref 0.6–1.3)
EOSINOPHIL # BLD AUTO: 0.3 10*3/MM3 (ref 0–0.3)
EOSINOPHIL NFR BLD AUTO: 3.9 % (ref 0–3)
ERYTHROCYTE [DISTWIDTH] IN BLOOD BY AUTOMATED COUNT: 13.5 % (ref 11.3–14.5)
FERRITIN SERPL-MCNC: 113 NG/ML (ref 10–291)
FOLATE SERPL-MCNC: 7.38 NG/ML (ref 3.2–20)
GLOBULIN SER CALC-MCNC: 2.2 GM/DL
GLUCOSE SERPL-MCNC: 88 MG/DL (ref 70–100)
HCT VFR BLD AUTO: 42.7 % (ref 34.5–44)
HGB BLD-MCNC: 13.8 G/DL (ref 11.5–15.5)
IMM GRANULOCYTES # BLD: 0.01 10*3/MM3 (ref 0–0.03)
IMM GRANULOCYTES NFR BLD: 0.1 % (ref 0–0.6)
IRON SERPL-MCNC: 90 MCG/DL (ref 50–175)
LYMPHOCYTES # BLD AUTO: 2.41 10*3/MM3 (ref 0.6–4.8)
LYMPHOCYTES NFR BLD AUTO: 31 % (ref 24–44)
Lab: NORMAL
MCH RBC QN AUTO: 29.8 PG (ref 27–31)
MCHC RBC AUTO-ENTMCNC: 32.3 G/DL (ref 32–36)
MCV RBC AUTO: 92.2 FL (ref 80–99)
METHYLMALONATE SERPL-SCNC: 144 NMOL/L (ref 0–378)
MONOCYTES # BLD AUTO: 0.56 10*3/MM3 (ref 0–1)
MONOCYTES NFR BLD AUTO: 7.2 % (ref 0–12)
NEUTROPHILS # BLD AUTO: 4.47 10*3/MM3 (ref 1.5–8.3)
NEUTROPHILS NFR BLD AUTO: 57.5 % (ref 41–71)
PLATELET # BLD AUTO: 347 10*3/MM3 (ref 150–450)
POTASSIUM SERPL-SCNC: 4.7 MMOL/L (ref 3.5–5.5)
PREALB SERPL-MCNC: 19 MG/DL (ref 12–34)
PROT SERPL-MCNC: 6.2 G/DL (ref 5.7–8.2)
RBC # BLD AUTO: 4.63 10*6/MM3 (ref 3.89–5.14)
SODIUM SERPL-SCNC: 137 MMOL/L (ref 132–146)
VIT B1 BLD-SCNC: 97.8 NMOL/L (ref 66.5–200)
WBC # BLD AUTO: 7.77 10*3/MM3 (ref 3.5–10.8)

## 2018-12-07 RX ORDER — ERGOCALCIFEROL 1.25 MG/1
50000 CAPSULE ORAL 3 TIMES WEEKLY
Qty: 36 CAPSULE | Refills: 0 | Status: SHIPPED | OUTPATIENT
Start: 2018-12-07 | End: 2019-04-08

## 2019-02-28 ENCOUNTER — OFFICE VISIT (OUTPATIENT)
Dept: BARIATRICS/WEIGHT MGMT | Facility: CLINIC | Age: 49
End: 2019-02-28

## 2019-02-28 VITALS
BODY MASS INDEX: 34.5 KG/M2 | TEMPERATURE: 97.8 F | DIASTOLIC BLOOD PRESSURE: 68 MMHG | OXYGEN SATURATION: 99 % | RESPIRATION RATE: 18 BRPM | HEIGHT: 62 IN | SYSTOLIC BLOOD PRESSURE: 118 MMHG | HEART RATE: 74 BPM | WEIGHT: 187.5 LBS

## 2019-02-28 DIAGNOSIS — E66.9 OBESITY, CLASS I, BMI 30-34.9: ICD-10-CM

## 2019-02-28 DIAGNOSIS — K21.9 GASTROESOPHAGEAL REFLUX DISEASE, ESOPHAGITIS PRESENCE NOT SPECIFIED: ICD-10-CM

## 2019-02-28 DIAGNOSIS — E55.9 HYPOVITAMINOSIS D: ICD-10-CM

## 2019-02-28 DIAGNOSIS — Z90.3 POSTGASTRECTOMY MALABSORPTION: ICD-10-CM

## 2019-02-28 DIAGNOSIS — K91.2 POSTGASTRECTOMY MALABSORPTION: ICD-10-CM

## 2019-02-28 DIAGNOSIS — R53.83 FATIGUE, UNSPECIFIED TYPE: Primary | ICD-10-CM

## 2019-02-28 DIAGNOSIS — Z98.84 STATUS POST BARIATRIC SURGERY: ICD-10-CM

## 2019-02-28 DIAGNOSIS — Z13.0 SCREENING, IRON DEFICIENCY ANEMIA: ICD-10-CM

## 2019-02-28 DIAGNOSIS — Z13.21 MALNUTRITION SCREEN: ICD-10-CM

## 2019-02-28 PROCEDURE — 94690 O2 UPTK REST INDIRECT: CPT | Performed by: PHYSICIAN ASSISTANT

## 2019-02-28 PROCEDURE — 99214 OFFICE O/P EST MOD 30 MIN: CPT | Performed by: PHYSICIAN ASSISTANT

## 2019-02-28 NOTE — PROGRESS NOTES
"[facility] Bariatric Surgery  [address]  [facility] [id number] [phone]        Patient Name:  Shaun Morales.  :  1970        Reason for Visit:   Annual Eval      HPI: Shaun Morales is a 48 y.o. female s/p LSG/para HHR by Dr. Mckeon 18    Presents today \"in a rut\", feeling discouraged, tearful. Although she recognizes she has lost 100lb, she though surgery would \"fix her problems\" and it hasn't. She has psoriatic arthritis and is in a lot of pain all the time, worsened in her hips. Was told it may keep her from walking in the future.  Seeing rheumatology, was prescribed MTX but has not taken it bc she doesn't like the risk of cancer associated.  Also has \"done a lot of reading and knows NSAIDS are not advised after LSG\".  No GI issues. Reflux well controlled on PPI BID, occasionally forgets and will notice symptoms.   Denies dysphagia, nausea, vomiting and abdominal pain. Generally eating a pretty healthy diet, does stray occasionally, like the last couple days. Overall Getting 100g prot/day, protein bar + nuts, cheese, chicken. Eating 3 regular meals + 2 snacks.   Drinking 64+ fluid oz/day, water.  Last labs revealed Vitamin D low on twice weekly D, increased to three times weekly .  Taking MVI and Vit D- taking 50,000 twice a week.  On Omeprazole .  Exercising- got to gym 3 times a week.  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       Presurgery weight: 279 pounds.  Today's weight is 85 kg (187 lb 8 oz) pounds, today's  Body mass index is 34.29 kg/m²., and her weight loss since surgery is 92 pounds.      Past Medical History:   Diagnosis Date   • Anxiety    • Asthma     mild   • Back pain    • Depression    • Environmental allergies     pollen, pollution   • Family history of pseudocholinesterase deficiency     sister, dad had it.  Unsure if she has it, but reports has never had succinylcholine   • Fatigue    • GERD " "(gastroesophageal reflux disease)     rarely, associated with onions and red sauces, avoids both.  PRN Tums, EGD with Dr. Rich 2017, op report reviewed, no HH. urease neg. serum h. pyl neg   • H. pylori infection     symptoms of abodminal pain/dyspepsia, tx   • History of MRSA infection     UTI WITH REPORRTED 3-4 CLEAN CATCH WWITH NO mrSA    • Hyperlipidemia    • Hypertension    • IBS (irritable bowel syndrome)     constipation   • Interstitial cystitis     recent procedure to \"stretch\" the bladder, feels better; has pain in bladder as primary symptom, dyspareunia   • Mitral valve regurgitation     better now, has no symptoms, + hear murmur   • Mood disorder (CMS/[facility])     no formal dx of bipolar disorder, but after bad divorce took lithium   • Nausea     car sickness, has PRN Zofran, motion sickness, previously took meclizine   • Obstructive sleep apnea on CPAP     CPAP compliant setting 11   • Peripheral edema    • PONV (postoperative nausea and vomiting)    • Prediabetes    • Psoriasis    • Psoriatic arthritis (CMS/[facility])    • Trauma of chest      moving trailer, fell on her, several cracked ribs on left, feels it caused her umbo hernia recurrence.  had to be dug out.  Exhusband fx pelvis, mult surgeries.   • Vitamin D deficiency      Past Surgical History:   Procedure Laterality Date   • APPENDECTOMY     • CARDIAC CATHETERIZATION      done after discovery of MVP, also had a CHERISE, normal per patient   •  SECTION     • COLONOSCOPY     • CYSTOSCOPY BLADDER HYDRODISTENSION N/A 2017    Procedure: CYSTOSCOPY BLADDER HYDRODISTENSION;  Surgeon: Ion Herbert MD;  Location: [facility] OR;  Service:    • DIAGNOSTIC LAPAROSCOPY N/A 10/14/2016    Procedure: DIAGNOSTIC LAPAROSCOPY POSSIBLE RIGHT SALPINGOOPHORECTOMY;  Surgeon: Rory Owens DO;  Location: [facility] OR;  Service:    • ENDOSCOPY      Dr. Rich   • ENDOSCOPY N/A 2018    Procedure: ESOPHAGOGASTRODUODENOSCOPY;  " Surgeon: Aneesh Mckeon MD;  Location:  [facility] OR;  Service:    • FOOT SURGERY  1984   • GASTRIC SLEEVE LAPAROSCOPIC N/A 2/23/2018    Procedure: GASTRIC SLEEVE LAPAROSCOPIC;  Surgeon: Aneesh Mckeon MD;  Location:  [facility] OR;  Service:    • HYSTERECTOMY  2008, 2016    laparoscopy supra-cervical hysterectomy 2008, 2016 cervix and 1 ovary removed.  Remaining ovary has a cyst. initially done for pelvic pain/fibroids   • LAPAROSCOPIC APPENDECTOMY  2015   • PARAESOPHAGEAL HERNIA REPAIR N/A 2/23/2018    Procedure: PARAESOPHAGEAL HERNIA REPAIR LAPAROSCOPIC;  Surgeon: Aneesh Mckeon MD;  Location:  [facility] OR;  Service:    • SKIN BIOPSY     • STOMACH SURGERY      Gastric Sleeve    • TRACHELECTOMY N/A 10/14/2016    Procedure: TRACHELECTOMY VAGINAL;  Surgeon: Rory Owens DO;  Location:  [facility] OR;  Service:    • TUBAL ABDOMINAL LIGATION  2000    LEFT OVARY REMAINS   • UMBILICAL HERNIA REPAIR N/A 10/14/2016    Procedure: UMBILICAL HERNIA REPAIR;  Surgeon: Spike Porter MD;  Location:  [facility] OR;  Service:    • UMBILICAL HERNIA REPAIR N/A 12/9/2016    Procedure: UMBILICAL HERNIA REPAIR;  Surgeon: Spike Porter MD;  Location:  [facility] OR;  with mesh, supraumbilical   • WISDOM TOOTH EXTRACTION  2000     Outpatient Medications Marked as Taking for the 2/28/19 encounter (Office Visit) with Valerie Ceballos PA-C   Medication Sig Dispense Refill   • cetirizine (ZyrTEC) 10 MG tablet Take 10 mg by mouth daily     • lisinopril (PRINIVIL,ZESTRIL) 10 MG tablet Take 10 mg by mouth Daily.     • montelukast (SINGULAIR) 10 MG tablet Take 10 mg by mouth Daily.     • omeprazole (priLOSEC) 40 MG capsule Take 1 capsule by mouth 2 (Two) Times a Day. 60 capsule 2   • vitamin D (ERGOCALCIFEROL) 23306 units capsule capsule Take 1 capsule by mouth 3 (Three) Times a Week. 36 capsule 0       Allergies   Allergen Reactions   • Amlodipine GI Intolerance and Arrhythmia   • Morphine And Related Itching     Dilaudid ok, percocet/lortab  "ok   • Nifedipine GI Intolerance and Arrhythmia     Adalat, procardia   • Nsaids Unknown (See Comments)     Pt states she cannot take NSAIDs for a peroid of time after having her Gastric Sleeve Surgery    • Other Other (See Comments)     Pt states she cannot taken Steroids for some time after Gastric sleeve operation    • Adhesive Tape Rash     Can tolerate steristrips and skin glue   • Chlorhexidine Rash   • Diazepam Anxiety     \"reverse effect,\" \"makes me absolutely crazy\"   • Midazolam Anxiety   • Sulfa Antibiotics Rash       Social History     Socioeconomic History   • Marital status:      Spouse name: Not on file   • Number of children: Not on file   • Years of education: Not on file   • Highest education level: Not on file   Social Needs   • Financial resource strain: Not on file   • Food insecurity - worry: Not on file   • Food insecurity - inability: Not on file   • Transportation needs - medical: Not on file   • Transportation needs - non-medical: Not on file   Occupational History   • Not on file   Tobacco Use   • Smoking status: Former Smoker     Packs/day: 0.50     Years: 10.00     Pack years: 5.00     Types: Cigarettes     Last attempt to quit: 2013     Years since quittin.1   • Smokeless tobacco: Never Used   • Tobacco comment: Is around secondhand smoke occasionally in car but not in house.  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         /68 (BP Location: Left arm, Patient Position: Sitting, Cuff Size: Large Adult)   Pulse 74   Temp 97.8 °F (36.6 °C) (Temporal)   Resp 18   Ht 157.5 cm (62\")   Wt 85 kg (187 lb 8 oz)   LMP  (LMP Unknown) Comment: hysterectomy  SpO2 99%   BMI 34.29 kg/m²     Physical Exam "   Constitutional: She is oriented to person, place, and time. She appears well-developed and well-nourished.   HENT:   Head: Normocephalic and atraumatic.   Cardiovascular: Normal rate and regular rhythm.   Pulmonary/Chest: Effort normal and breath sounds normal.   Abdominal: Soft. Bowel sounds are normal.   Incisions healing well   Neurological: She is alert and oriented to person, place, and time.   Skin: Skin is warm and dry.   Psychiatric: She has a normal mood and affect. Her behavior is normal. Judgment and thought content normal.         Assessment:  Annual eval s/p LSG/para HHR by Dr. Mckeon 2/23/18    ICD-10-CM ICD-9-CM   1. Fatigue, unspecified type R53.83 780.79   2. Gastroesophageal reflux disease, esophagitis presence not specified K21.9 530.81   3. Status post bariatric surgery Z98.84 V45.86   4. Obesity, Class I, BMI 30-34.9 E66.9 278.00   5. Screening, iron deficiency anemia Z13.0 V78.0   6. Malnutrition screen Z13.21 V77.2   7. Hypovitaminosis D E55.9 268.9   8. Postgastrectomy malabsorption K91.2 579.3    Z90.3          Plan:  Lengthy discussion re: great progress with weight loss. Discussed that there are no contraindications to NSAIDS or any medications at this point s/p LSG. Advised discussion with rheumatologist re: management options of psoriatic arthritis.  Also gave Dr. Kraft's card with contact info for counseling/ consult. Encouraged discussion with PCP re: depression. Will determine WLZ with BMR today.  Continue w/ good food choices and healthy habits.  Continue to focus on high protein, low carb.  Keep tracking intake.  Continue routine exercise.  Routine bariatric labs ordered.  Continue vitamins w/ adjustments pending lab results.  Call w/ problems/concerns.     The patient was instructed to follow up in 6 months, sooner if needed.      Total time spent w/ patient 25 minutes and 15 minutes spent counseling the patient on nutrition and necessary dietary/lifestyle modifications.    BMR WLZ  fast +17% [phone]

## 2019-03-03 LAB
25(OH)D3+25(OH)D2 SERPL-MCNC: 22.7 NG/ML (ref 30–100)
ALBUMIN SERPL-MCNC: 4.3 G/DL (ref 3.5–5.5)
ALBUMIN/GLOB SERPL: 1.7 {RATIO} (ref 1.2–2.2)
ALP SERPL-CCNC: 88 IU/L (ref 39–117)
ALT SERPL-CCNC: 18 IU/L (ref 0–32)
AST SERPL-CCNC: 17 IU/L (ref 0–40)
BASOPHILS # BLD AUTO: 0 X10E3/UL (ref 0–0.2)
BASOPHILS NFR BLD AUTO: 0 %
BILIRUB SERPL-MCNC: 0.3 MG/DL (ref 0–1.2)
BUN SERPL-MCNC: 14 MG/DL (ref 6–24)
BUN/CREAT SERPL: 21 (ref 9–23)
CALCIUM SERPL-MCNC: 9.2 MG/DL (ref 8.7–10.2)
CHLORIDE SERPL-SCNC: 107 MMOL/L (ref 96–106)
CO2 SERPL-SCNC: 23 MMOL/L (ref 20–29)
CREAT SERPL-MCNC: 0.68 MG/DL (ref 0.57–1)
EOSINOPHIL # BLD AUTO: 0.5 X10E3/UL (ref 0–0.4)
EOSINOPHIL NFR BLD AUTO: 7 %
ERYTHROCYTE [DISTWIDTH] IN BLOOD BY AUTOMATED COUNT: 13.4 % (ref 12.3–15.4)
FERRITIN SERPL-MCNC: 104 NG/ML (ref 15–150)
FOLATE SERPL-MCNC: 11.2 NG/ML
GLOBULIN SER CALC-MCNC: 2.5 G/DL (ref 1.5–4.5)
GLUCOSE SERPL-MCNC: 96 MG/DL (ref 65–99)
HCT VFR BLD AUTO: 40.4 % (ref 34–46.6)
HGB BLD-MCNC: 13.5 G/DL (ref 11.1–15.9)
IMM GRANULOCYTES # BLD AUTO: 0 X10E3/UL (ref 0–0.1)
IMM GRANULOCYTES NFR BLD AUTO: 0 %
IRON SERPL-MCNC: 86 UG/DL (ref 27–159)
LYMPHOCYTES # BLD AUTO: 3.3 X10E3/UL (ref 0.7–3.1)
LYMPHOCYTES NFR BLD AUTO: 45 %
Lab: NORMAL
MCH RBC QN AUTO: 30.2 PG (ref 26.6–33)
MCHC RBC AUTO-ENTMCNC: 33.4 G/DL (ref 31.5–35.7)
MCV RBC AUTO: 90 FL (ref 79–97)
METHYLMALONATE SERPL-SCNC: 172 NMOL/L (ref 0–378)
MONOCYTES # BLD AUTO: 0.6 X10E3/UL (ref 0.1–0.9)
MONOCYTES NFR BLD AUTO: 8 %
NEUTROPHILS # BLD AUTO: 2.9 X10E3/UL (ref 1.4–7)
NEUTROPHILS NFR BLD AUTO: 40 %
PLATELET # BLD AUTO: 334 X10E3/UL (ref 150–379)
POTASSIUM SERPL-SCNC: 4.4 MMOL/L (ref 3.5–5.2)
PREALB SERPL-MCNC: 21 MG/DL (ref 12–34)
PROT SERPL-MCNC: 6.8 G/DL (ref 6–8.5)
RBC # BLD AUTO: 4.47 X10E6/UL (ref 3.77–5.28)
SODIUM SERPL-SCNC: 145 MMOL/L (ref 134–144)
VIT B1 BLD-SCNC: 76.7 NMOL/L (ref 66.5–200)
WBC # BLD AUTO: 7.3 X10E3/UL (ref 3.4–10.8)

## 2019-03-03 NOTE — ED NOTES
ECG performed by mariano @2005, Brian Milan, and shown to Dr Beltre @ 2006.     Manuel Flannery  01/02/17 2008     Volume controlled with hemodialysis, remains a beta-blocker, cardiology continues to follow

## 2019-04-08 ENCOUNTER — APPOINTMENT (OUTPATIENT)
Dept: GENERAL RADIOLOGY | Facility: HOSPITAL | Age: 49
End: 2019-04-08

## 2019-04-08 ENCOUNTER — HOSPITAL ENCOUNTER (EMERGENCY)
Facility: HOSPITAL | Age: 49
Discharge: HOME | End: 2019-04-09
Attending: EMERGENCY MEDICINE | Admitting: EMERGENCY MEDICINE

## 2019-04-08 DIAGNOSIS — S96.912A MUSCLE STRAIN OF LEFT FOOT, INITIAL ENCOUNTER: Primary | ICD-10-CM

## 2019-04-08 PROCEDURE — 73610 X-RAY EXAM OF ANKLE: CPT | Performed by: RADIOLOGY

## 2019-04-08 PROCEDURE — 73630 X-RAY EXAM OF FOOT: CPT

## 2019-04-08 PROCEDURE — 73610 X-RAY EXAM OF ANKLE: CPT

## 2019-04-08 PROCEDURE — 73630 X-RAY EXAM OF FOOT: CPT | Performed by: RADIOLOGY

## 2019-04-08 PROCEDURE — 99283 EMERGENCY DEPT VISIT LOW MDM: CPT

## 2019-04-08 RX ORDER — LISINOPRIL 20 MG/1
20 TABLET ORAL DAILY
COMMUNITY
End: 2020-01-27 | Stop reason: DRUGHIGH

## 2019-04-08 RX ORDER — ACETAMINOPHEN 500 MG
1000 TABLET ORAL ONCE
Status: COMPLETED | OUTPATIENT
Start: 2019-04-08 | End: 2019-04-09

## 2019-04-09 VITALS
TEMPERATURE: 98 F | HEIGHT: 62 IN | SYSTOLIC BLOOD PRESSURE: 142 MMHG | DIASTOLIC BLOOD PRESSURE: 88 MMHG | RESPIRATION RATE: 16 BRPM | HEART RATE: 70 BPM | BODY MASS INDEX: 33.13 KG/M2 | OXYGEN SATURATION: 100 % | WEIGHT: 180 LBS

## 2019-04-09 RX ADMIN — ACETAMINOPHEN 1000 MG: 500 TABLET ORAL at 00:01

## 2019-04-09 NOTE — ED PROVIDER NOTES
Subjective     History provided by:  Patient   used: No    Lower Extremity Issue   Location:  Foot  Time since incident:  3 days  Foot location:  L foot  Pain details:     Quality:  Throbbing    Radiates to:  Does not radiate    Severity:  Moderate    Onset quality:  Sudden (fell through floor joists)    Timing:  Constant    Progression:  Worsening  Chronicity:  New  Dislocation: no    Prior injury to area:  No  Relieved by:  Nothing  Worsened by:  Bearing weight  Ineffective treatments:  None tried  Associated symptoms: decreased ROM and swelling    Associated symptoms: no fever    Risk factors: obesity    Risk factors: no concern for non-accidental trauma and no frequent fractures        Review of Systems   Constitutional: Negative.  Negative for fever.   HENT: Negative.    Respiratory: Negative.    Cardiovascular: Negative.  Negative for chest pain.   Gastrointestinal: Negative.  Negative for abdominal pain.   Endocrine: Negative.    Genitourinary: Negative.  Negative for dysuria.   Musculoskeletal:        (+) left foot pain     Skin: Negative.    Neurological: Negative.    Psychiatric/Behavioral: Negative.    All other systems reviewed and are negative.      Past Medical History:   Diagnosis Date   • Anxiety    • Asthma     mild   • Back pain    • Depression    • Environmental allergies     pollen, pollution   • Family history of pseudocholinesterase deficiency     sister, dad had it.  Unsure if she has it, but reports has never had succinylcholine   • Fatigue    • GERD (gastroesophageal reflux disease)     rarely, associated with onions and red sauces, avoids both.  PRN Tums, EGD with Dr. Rich 2017, op report reviewed, no HH. urease neg. serum h. pyl neg   • H. pylori infection     symptoms of abodminal pain/dyspepsia, tx   • History of MRSA infection 2012    UTI WITH REPORRTED 3-4 CLEAN CATCH WWITH NO mrSA    • Hyperlipidemia    • Hypertension    • IBS (irritable bowel syndrome)      "constipation   • Interstitial cystitis     recent procedure to \"stretch\" the bladder, feels better; has pain in bladder as primary symptom, dyspareunia   • Mitral valve regurgitation     better now, has no symptoms, + hear murmur   • Mood disorder (CMS/[facility])     no formal dx of bipolar disorder, but after bad divorce took lithium   • Nausea     car sickness, has PRN Zofran, motion sickness, previously took meclizine   • Obstructive sleep apnea on CPAP     CPAP compliant setting 11   • Peripheral edema    • PONV (postoperative nausea and vomiting)    • Prediabetes    • Psoriasis    • Psoriatic arthritis (CMS/[facility])    • Trauma of chest      moving trailer, fell on her, several cracked ribs on left, feels it caused her umbo hernia recurrence.  had to be dug out.  Exhusband fx pelvis, mult surgeries.   • Vitamin D deficiency        Allergies   Allergen Reactions   • Amlodipine GI Intolerance and Arrhythmia   • Morphine And Related Itching     Dilaudid ok, percocet/lortab ok   • Nifedipine GI Intolerance and Arrhythmia     Adalat, procardia   • Nsaids Unknown (See Comments)     Pt states she cannot take NSAIDs for a peroid of time after having her Gastric Sleeve Surgery    • Other Other (See Comments)     Pt states she cannot taken Steroids for some time after Gastric sleeve operation    • Adhesive Tape Rash     Can tolerate steristrips and skin glue   • Chlorhexidine Rash   • Diazepam Anxiety     \"reverse effect,\" \"makes me absolutely crazy\"   • Midazolam Anxiety   • Sulfa Antibiotics Rash       Past Surgical History:   Procedure Laterality Date   • APPENDECTOMY     • CARDIAC CATHETERIZATION      done after discovery of MVP, also had a CHERISE, normal per patient   •  SECTION     • COLONOSCOPY     • CYSTOSCOPY BLADDER HYDRODISTENSION N/A 2017    Procedure: CYSTOSCOPY BLADDER HYDRODISTENSION;  Surgeon: Ion Herbert MD;  [facility]: [facility];  Service:    • DIAGNOSTIC LAPAROSCOPY N/A " 10/14/2016    Procedure: DIAGNOSTIC LAPAROSCOPY POSSIBLE RIGHT SALPINGOOPHORECTOMY;  Surgeon: Rory Owens DO;  Location:  [facility];  Service:    • ENDOSCOPY  2017    Dr. Rich   • ENDOSCOPY N/A 2/23/2018    Procedure: ESOPHAGOGASTRODUODENOSCOPY;  Surgeon: Aneesh Mckeon MD;  Location:  [facility];  Service:    • FOOT SURGERY  1984   • GASTRIC SLEEVE LAPAROSCOPIC N/A 2/23/2018    Procedure: GASTRIC SLEEVE LAPAROSCOPIC;  Surgeon: Aneesh Mckeon MD;  Location:  [facility];  Service:    • HYSTERECTOMY  2008, 2016    laparoscopy supra-cervical hysterectomy 2008, 2016 cervix and 1 ovary removed.  Remaining ovary has a cyst. initially done for pelvic pain/fibroids   • LAPAROSCOPIC APPENDECTOMY  2015   • PARAESOPHAGEAL HERNIA REPAIR N/A 2/23/2018    Procedure: PARAESOPHAGEAL HERNIA REPAIR LAPAROSCOPIC;  Surgeon: Aneesh Mckeon MD;  Location:  [facility];  Service:    • SKIN BIOPSY     • STOMACH SURGERY      Gastric Sleeve    • TRACHELECTOMY N/A 10/14/2016    Procedure: TRACHELECTOMY VAGINAL;  Surgeon: Rory Owens DO;  Location:  [facility];  Service:    • TUBAL ABDOMINAL LIGATION  2000    LEFT OVARY REMAINS   • UMBILICAL HERNIA REPAIR N/A 10/14/2016    Procedure: UMBILICAL HERNIA REPAIR;  Surgeon: Spike Porter MD;  Location:  [facility];  Service:    • UMBILICAL HERNIA REPAIR N/A 12/9/2016    Procedure: UMBILICAL HERNIA REPAIR;  Surgeon: Spike Porter MD;  Location:  [facility];  with mesh, supraumbilical   • WISDOM TOOTH EXTRACTION  2000       Family History   Problem Relation Age of Onset   • Diabetes Mother    • Hypertension Mother    • Stroke Mother    • Heart disease Mother    • Cancer Father    • Sleep apnea Father    • Diabetes Father    • Anesthesia problems Sister    • Heart attack Maternal Grandmother 70   • Obesity Maternal Grandmother    • Hypertension Maternal Grandmother    • Heart disease Maternal Grandmother    • Sleep apnea Maternal Grandmother    • Stroke Maternal Grandmother     • Heart attack Maternal Grandfather    • Heart disease Maternal Grandfather    • Heart attack Paternal Grandmother 54   • Sleep apnea Paternal Grandmother    • Heart disease Paternal Grandmother    • Hypertension Paternal Grandmother    • Obesity Paternal Grandmother    • Rheum arthritis Neg Hx    • Osteoarthritis Neg Hx    • Asthma Neg Hx    • Heart failure Neg Hx    • Hyperlipidemia Neg Hx    • Migraines Neg Hx    • Rashes / Skin problems Neg Hx    • Seizures Neg Hx    • Thyroid disease Neg Hx    • Breast cancer Neg Hx        Social History     Socioeconomic History   • Marital status:      Spouse name: Not on file   • Number of children: Not on file   • Years of education: Not on file   • Highest education level: Not on file   Tobacco Use   • Smoking status: Former Smoker     Packs/day: 0.50     Years: 10.00     Pack years: 5.00     Types: Cigarettes     Last attempt to quit: 2013     Years since quittin.2   • Smokeless tobacco: Never Used   • Tobacco comment: Is around secondhand smoke occasionally in car but not in house.  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             Objective   Physical Exam   Constitutional: She is oriented to person, place, and time. She appears well-developed and well-nourished. No distress.   HENT:   Head: Normocephalic and atraumatic.   Right Ear: External ear normal.   Left Ear: External ear normal.   Nose: Nose normal.   Eyes: Conjunctivae and EOM are normal. Pupils are equal, round, and reactive to light.   Neck: Normal range of motion. Neck supple. No JVD present. No tracheal deviation present.   Cardiovascular: Normal rate, regular rhythm and normal heart sounds.   No murmur heard.  Pulmonary/Chest: Effort normal and breath  sounds normal. No respiratory distress. She has no wheezes.   Abdominal: Soft. Bowel sounds are normal. There is no tenderness.   Musculoskeletal: She exhibits no edema or deformity.        Left foot: There is decreased range of motion, tenderness, bony tenderness and swelling. There is no deformity.   Neurological: She is alert and oriented to person, place, and time. No cranial nerve deficit.   Skin: Skin is warm and dry. No rash noted. She is not diaphoretic. No erythema. No pallor.   Psychiatric: She has a normal mood and affect. Her behavior is normal. Thought content normal.   Nursing note and vitals reviewed.      Splint - Cast - Strapping  Date/Time: 4/9/2019 12:24 AM  Performed by: Onel Gamez PA-C  Authorized by: Ashish Beltre MD     Consent:     Consent obtained:  Verbal    Consent given by:  Patient    Risks discussed:  Discoloration, numbness, pain and swelling    Alternatives discussed:  No treatment, delayed treatment, alternative treatment, observation and referral  Universal protocol:     Procedure explained and questions answered to patient or proxy's satisfaction: yes      Relevant documents present and verified: yes      Test results available and properly labeled: yes      Imaging studies available: yes      Required blood products, implants, devices, and special equipment available: yes      Site/side marked: yes      Immediately prior to procedure a time out was called: yes      Patient identity confirmed:  Verbally with patient, arm band, provided demographic data and hospital-assigned identification number  Pre-procedure details:     Sensation:  Normal    Skin color:  Warm pink  Procedure details:     Laterality:  Left    Splint type:  CAM walker boot  Post-procedure details:     Pain:  Unchanged    Sensation:  Normal    Skin color:  Warm pink    Patient tolerance of procedure:  Tolerated well, no immediate complications  Comments:      Patient tolerated splint application  well, per tech. No acute complications. Neurovascularly intact.                 ED Course  ED Course as of Apr 09 0026   Mon Apr 08, 2019   2347 Per Dr. Beltre, there is no acute bony abnormality. XR Foot 3+ View Left [TK]   2347 Per Dr. Beltre, there is no acute bony abnormality.   XR Ankle 3+ View Left [TK]      ED Course User Index  [TK] Onel Gamez PA-C                  MDM  Number of Diagnoses or Management Options  Muscle strain of left foot, initial encounter: new and requires workup     Amount and/or Complexity of Data Reviewed  Tests in the radiology section of CPT®: reviewed and ordered  Discuss the patient with other providers: yes (Alexsander)    Risk of Complications, Morbidity, and/or Mortality  Presenting problems: moderate  Diagnostic procedures: moderate  Management options: moderate          Final diagnoses:   Muscle strain of left foot, initial encounter            Onel Gamez PA-C  04/09/19 0026

## 2019-05-19 ENCOUNTER — APPOINTMENT (OUTPATIENT)
Dept: GENERAL RADIOLOGY | Facility: HOSPITAL | Age: 49
End: 2019-05-19

## 2019-05-19 ENCOUNTER — HOSPITAL ENCOUNTER (EMERGENCY)
Facility: HOSPITAL | Age: 49
Discharge: HOME | End: 2019-05-19
Attending: EMERGENCY MEDICINE | Admitting: EMERGENCY MEDICINE

## 2019-05-19 VITALS
BODY MASS INDEX: 33.13 KG/M2 | DIASTOLIC BLOOD PRESSURE: 76 MMHG | RESPIRATION RATE: 16 BRPM | TEMPERATURE: 97.9 F | WEIGHT: 180 LBS | SYSTOLIC BLOOD PRESSURE: 132 MMHG | HEIGHT: 62 IN | OXYGEN SATURATION: 98 % | HEART RATE: 62 BPM

## 2019-05-19 DIAGNOSIS — J06.9 UPPER RESPIRATORY TRACT INFECTION, UNSPECIFIED TYPE: Primary | ICD-10-CM

## 2019-05-19 LAB
ALBUMIN SERPL-MCNC: 3.63 G/DL (ref 3.5–5.2)
ALBUMIN/GLOB SERPL: 1.2 G/DL
ALP SERPL-CCNC: 79 U/L (ref 39–117)
ALT SERPL W P-5'-P-CCNC: 10 U/L (ref 1–33)
ANION GAP SERPL CALCULATED.3IONS-SCNC: 10.3 MMOL/L
AST SERPL-CCNC: 12 U/L (ref 1–32)
BASOPHILS # BLD AUTO: 0.02 10*3/MM3 (ref 0–0.2)
BASOPHILS NFR BLD AUTO: 0.2 % (ref 0–1.5)
BILIRUB SERPL-MCNC: 0.4 MG/DL (ref 0.2–1.2)
BILIRUB UR QL STRIP: NEGATIVE
BUN BLD-MCNC: 11 MG/DL (ref 6–20)
BUN/CREAT SERPL: 20.4 (ref 7–25)
CALCIUM SPEC-SCNC: 8.2 MG/DL (ref 8.6–10.5)
CHLORIDE SERPL-SCNC: 106 MMOL/L (ref 98–107)
CLARITY UR: CLEAR
CO2 SERPL-SCNC: 23.7 MMOL/L (ref 22–29)
COLOR UR: YELLOW
CREAT BLD-MCNC: 0.54 MG/DL (ref 0.57–1)
CRP SERPL-MCNC: 0.16 MG/DL (ref 0–0.5)
DEPRECATED RDW RBC AUTO: 43.2 FL (ref 37–54)
EOSINOPHIL # BLD AUTO: 0.26 10*3/MM3 (ref 0–0.4)
EOSINOPHIL NFR BLD AUTO: 2.9 % (ref 0.3–6.2)
ERYTHROCYTE [DISTWIDTH] IN BLOOD BY AUTOMATED COUNT: 13.3 % (ref 12.3–15.4)
FLUAV AG NPH QL: NEGATIVE
FLUBV AG NPH QL IA: NEGATIVE
GFR SERPL CREATININE-BSD FRML MDRD: 122 ML/MIN/1.73
GLOBULIN UR ELPH-MCNC: 3 GM/DL
GLUCOSE BLD-MCNC: 94 MG/DL (ref 65–99)
GLUCOSE UR STRIP-MCNC: NEGATIVE MG/DL
HCT VFR BLD AUTO: 41.5 % (ref 34–46.6)
HGB BLD-MCNC: 13.8 G/DL (ref 12–15.9)
HGB UR QL STRIP.AUTO: NEGATIVE
IMM GRANULOCYTES # BLD AUTO: 0.01 10*3/MM3 (ref 0–0.05)
IMM GRANULOCYTES NFR BLD AUTO: 0.1 % (ref 0–0.5)
KETONES UR QL STRIP: NEGATIVE
LEUKOCYTE ESTERASE UR QL STRIP.AUTO: NEGATIVE
LYMPHOCYTES # BLD AUTO: 2.27 10*3/MM3 (ref 0.7–3.1)
LYMPHOCYTES NFR BLD AUTO: 25.6 % (ref 19.6–45.3)
MCH RBC QN AUTO: 30.5 PG (ref 26.6–33)
MCHC RBC AUTO-ENTMCNC: 33.3 G/DL (ref 31.5–35.7)
MCV RBC AUTO: 91.6 FL (ref 79–97)
MONOCYTES # BLD AUTO: 0.71 10*3/MM3 (ref 0.1–0.9)
MONOCYTES NFR BLD AUTO: 8 % (ref 5–12)
NEUTROPHILS # BLD AUTO: 5.58 10*3/MM3 (ref 1.7–7)
NEUTROPHILS NFR BLD AUTO: 63.2 % (ref 42.7–76)
NITRITE UR QL STRIP: NEGATIVE
PH UR STRIP.AUTO: >=9 [PH] (ref 5–8)
PLATELET # BLD AUTO: 276 10*3/MM3 (ref 140–450)
PMV BLD AUTO: 11.3 FL (ref 6–12)
POTASSIUM BLD-SCNC: 4 MMOL/L (ref 3.5–5.2)
PROT SERPL-MCNC: 6.6 G/DL (ref 6–8.5)
PROT UR QL STRIP: NEGATIVE
RBC # BLD AUTO: 4.53 10*6/MM3 (ref 3.77–5.28)
SODIUM BLD-SCNC: 140 MMOL/L (ref 136–145)
SP GR UR STRIP: 1.02 (ref 1–1.03)
UROBILINOGEN UR QL STRIP: ABNORMAL
WBC NRBC COR # BLD: 8.85 10*3/MM3 (ref 3.4–10.8)

## 2019-05-19 PROCEDURE — 85025 COMPLETE CBC W/AUTO DIFF WBC: CPT | Performed by: PHYSICIAN ASSISTANT

## 2019-05-19 PROCEDURE — 36415 COLL VENOUS BLD VENIPUNCTURE: CPT

## 2019-05-19 PROCEDURE — 86140 C-REACTIVE PROTEIN: CPT | Performed by: PHYSICIAN ASSISTANT

## 2019-05-19 PROCEDURE — 71046 X-RAY EXAM CHEST 2 VIEWS: CPT | Performed by: RADIOLOGY

## 2019-05-19 PROCEDURE — 25010000002 METHYLPREDNISOLONE PER 40 MG: Performed by: PHYSICIAN ASSISTANT

## 2019-05-19 PROCEDURE — 87804 INFLUENZA ASSAY W/OPTIC: CPT | Performed by: PHYSICIAN ASSISTANT

## 2019-05-19 PROCEDURE — 71046 X-RAY EXAM CHEST 2 VIEWS: CPT

## 2019-05-19 PROCEDURE — 25010000002 CEFTRIAXONE PER 250 MG: Performed by: PHYSICIAN ASSISTANT

## 2019-05-19 PROCEDURE — 81003 URINALYSIS AUTO W/O SCOPE: CPT | Performed by: PHYSICIAN ASSISTANT

## 2019-05-19 PROCEDURE — 80053 COMPREHEN METABOLIC PANEL: CPT | Performed by: PHYSICIAN ASSISTANT

## 2019-05-19 PROCEDURE — 87040 BLOOD CULTURE FOR BACTERIA: CPT | Performed by: PHYSICIAN ASSISTANT

## 2019-05-19 PROCEDURE — 93005 ELECTROCARDIOGRAM TRACING: CPT | Performed by: EMERGENCY MEDICINE

## 2019-05-19 PROCEDURE — 99284 EMERGENCY DEPT VISIT MOD MDM: CPT

## 2019-05-19 PROCEDURE — 96372 THER/PROPH/DIAG INJ SC/IM: CPT

## 2019-05-19 RX ORDER — ALBUTEROL SULFATE 90 UG/1
2 AEROSOL, METERED RESPIRATORY (INHALATION) EVERY 4 HOURS PRN
Qty: 1 INHALER | Refills: 0 | Status: SHIPPED | OUTPATIENT
Start: 2019-05-19

## 2019-05-19 RX ORDER — CEFTRIAXONE 1 G/1
1000 INJECTION, POWDER, FOR SOLUTION INTRAMUSCULAR; INTRAVENOUS ONCE
Status: COMPLETED | OUTPATIENT
Start: 2019-05-19 | End: 2019-05-19

## 2019-05-19 RX ORDER — METHYLPREDNISOLONE SODIUM SUCCINATE 40 MG/ML
80 INJECTION, POWDER, LYOPHILIZED, FOR SOLUTION INTRAMUSCULAR; INTRAVENOUS ONCE
Status: COMPLETED | OUTPATIENT
Start: 2019-05-19 | End: 2019-05-19

## 2019-05-19 RX ORDER — GUAIFENESIN DEXTROMETHORPHAN HYDROBROMIDE ORAL SOLUTION 10; 100 MG/5ML; MG/5ML
5 SOLUTION ORAL EVERY 12 HOURS
Qty: 118 ML | Refills: 0 | Status: SHIPPED | OUTPATIENT
Start: 2019-05-19 | End: 2020-01-27

## 2019-05-19 RX ORDER — LIDOCAINE HYDROCHLORIDE 10 MG/ML
2.1 INJECTION, SOLUTION EPIDURAL; INFILTRATION; INTRACAUDAL; PERINEURAL ONCE
Status: COMPLETED | OUTPATIENT
Start: 2019-05-19 | End: 2019-05-19

## 2019-05-19 RX ORDER — METHYLPREDNISOLONE 4 MG/1
TABLET ORAL
Qty: 21 TABLET | Refills: 0 | Status: SHIPPED | OUTPATIENT
Start: 2019-05-19 | End: 2020-01-27

## 2019-05-19 RX ORDER — BENZONATATE 100 MG/1
200 CAPSULE ORAL ONCE
Status: COMPLETED | OUTPATIENT
Start: 2019-05-19 | End: 2019-05-19

## 2019-05-19 RX ORDER — AZITHROMYCIN 250 MG/1
250 TABLET, FILM COATED ORAL DAILY
Qty: 6 TABLET | Refills: 0 | Status: SHIPPED | OUTPATIENT
Start: 2019-05-19 | End: 2020-01-27

## 2019-05-19 RX ADMIN — LIDOCAINE HYDROCHLORIDE 2.1 ML: 10 INJECTION, SOLUTION EPIDURAL; INFILTRATION; INTRACAUDAL; PERINEURAL at 10:15

## 2019-05-19 RX ADMIN — CEFTRIAXONE 1000 MG: 1 INJECTION, POWDER, FOR SOLUTION INTRAMUSCULAR; INTRAVENOUS at 10:15

## 2019-05-19 RX ADMIN — BENZONATATE 200 MG: 100 CAPSULE ORAL at 08:31

## 2019-05-19 RX ADMIN — METHYLPREDNISOLONE SODIUM SUCCINATE 80 MG: 40 INJECTION, POWDER, FOR SOLUTION INTRAMUSCULAR; INTRAVENOUS at 10:17

## 2019-05-24 LAB
BACTERIA SPEC AEROBE CULT: NORMAL
BACTERIA SPEC AEROBE CULT: NORMAL

## 2019-09-19 ENCOUNTER — OFFICE VISIT (OUTPATIENT)
Dept: BARIATRICS/WEIGHT MGMT | Facility: CLINIC | Age: 49
End: 2019-09-19

## 2019-09-19 VITALS
WEIGHT: 181.5 LBS | BODY MASS INDEX: 33.4 KG/M2 | SYSTOLIC BLOOD PRESSURE: 122 MMHG | HEART RATE: 69 BPM | RESPIRATION RATE: 18 BRPM | OXYGEN SATURATION: 99 % | HEIGHT: 62 IN | DIASTOLIC BLOOD PRESSURE: 72 MMHG | TEMPERATURE: 97.1 F

## 2019-09-19 DIAGNOSIS — K21.9 GASTROESOPHAGEAL REFLUX DISEASE, ESOPHAGITIS PRESENCE NOT SPECIFIED: ICD-10-CM

## 2019-09-19 DIAGNOSIS — E55.9 HYPOVITAMINOSIS D: ICD-10-CM

## 2019-09-19 DIAGNOSIS — Z13.0 SCREENING, IRON DEFICIENCY ANEMIA: ICD-10-CM

## 2019-09-19 DIAGNOSIS — Z90.3 POSTGASTRECTOMY MALABSORPTION: ICD-10-CM

## 2019-09-19 DIAGNOSIS — K91.2 POSTGASTRECTOMY MALABSORPTION: ICD-10-CM

## 2019-09-19 DIAGNOSIS — Z13.21 MALNUTRITION SCREEN: ICD-10-CM

## 2019-09-19 DIAGNOSIS — E66.9 OBESITY, CLASS I, BMI 30-34.9: ICD-10-CM

## 2019-09-19 DIAGNOSIS — Z98.84 STATUS POST BARIATRIC SURGERY: Primary | ICD-10-CM

## 2019-09-19 PROCEDURE — 99214 OFFICE O/P EST MOD 30 MIN: CPT | Performed by: PHYSICIAN ASSISTANT

## 2019-09-19 RX ORDER — LORATADINE 10 MG/1
TABLET ORAL
COMMUNITY
Start: 2019-09-12 | End: 2020-01-27

## 2019-09-19 RX ORDER — ERGOCALCIFEROL 1.25 MG/1
50000 CAPSULE ORAL WEEKLY
COMMUNITY

## 2019-09-19 RX ORDER — OMEPRAZOLE 40 MG/1
40 CAPSULE, DELAYED RELEASE ORAL DAILY
Qty: 30 CAPSULE | Refills: 5 | Status: SHIPPED | OUTPATIENT
Start: 2019-09-19 | End: 2020-01-28 | Stop reason: HOSPADM

## 2019-09-19 RX ORDER — FLUTICASONE PROPIONATE 50 MCG
SPRAY, SUSPENSION (ML) NASAL
COMMUNITY
Start: 2019-09-11 | End: 2020-01-27

## 2019-09-19 RX ORDER — RANITIDINE 150 MG/1
150 CAPSULE ORAL EVERY EVENING
Qty: 30 CAPSULE | Refills: 5 | Status: SHIPPED | OUTPATIENT
Start: 2019-09-19 | End: 2019-10-19

## 2019-09-19 NOTE — PROGRESS NOTES
"[facility]ariatric Surgery  [address]  [facility] [id number] [phone]        Patient Name:  Shaun Morales.  :  1970        Reason for Visit:   Annual Eval  19 months postop    HPI: Shaun Morales is a 49 y.o. female s/p LSG/para HHR by Dr. Mckeon 18    LOV 2019- \"in a rut\", discouraged with continued psoriatic arthritis pain, no GI issues.  Recommended Dr. Kraft counseling. BMR WLZ fast +17% [phone]    Doing well. Bought a new house, renovated it completely. Overall doing much better.  Insurance is not covering omeprazole BID, has breakthrough reflux a couple times a week when only taking QD- similar to preLSG. Pleased with progress. Daughter is 1 month aware from LSG.  Has some abd pain where she had mesh placed at umbilical hernia with Dr. Porter, feels it has has changed/ worsened with weightloss.  No other issues/concerns.  Denies dysphagia, reflux, nausea and vomiting.  Getting 100+g prot/day.  Really fell off track, gained 10lb, and has lost a few of that getting back on track. Found herself eating french fries she cooked at work. Now focusing on meats and vegetables. Eating 4-5 times a day.  Drinking 64+ fluid oz/day, water, a lot of diet tea.  Last labs revealed vitamin D low, advised weekly D Rx, advised MVI.  Taking MVI and Vit D, had been on weekly D- currently out.  On Omeprazole .  Exercising- walking.     Presurgery weight: 279 pounds.  Today's weight is 82.3 kg (181 lb 8 oz) pounds, today's  Body mass index is 33.2 kg/m²., and her weight loss since surgery is 98 pounds.  LOV 187lb    Past Medical History:   Diagnosis Date   • Anxiety    • Asthma     mild   • Back pain    • Depression    • Environmental allergies     pollen, pollution   • Family history of pseudocholinesterase deficiency     sister, dad had it.  Unsure if she has it, but reports has never had succinylcholine   • Fatigue    • GERD (gastroesophageal reflux disease)     rarely, " "associated with onions and red sauces, avoids both.  PRN Tums, EGD with Dr. Rich 2017, op report reviewed, no HH. urease neg. serum h. pyl neg   • H. pylori infection     symptoms of abodminal pain/dyspepsia, tx   • History of MRSA infection     UTI WITH REPORRTED 3-4 CLEAN CATCH WWITH NO mrSA    • Hyperlipidemia    • Hypertension    • IBS (irritable bowel syndrome)     constipation   • Interstitial cystitis     recent procedure to \"stretch\" the bladder, feels better; has pain in bladder as primary symptom, dyspareunia   • Mitral valve regurgitation     better now, has no symptoms, + hear murmur   • Mood disorder (CMS/[facility])     no formal dx of bipolar disorder, but after bad divorce took lithium   • Nausea     car sickness, has PRN Zofran, motion sickness, previously took meclizine   • Obstructive sleep apnea on CPAP     CPAP compliant setting 11   • Peripheral edema    • PONV (postoperative nausea and vomiting)    • Prediabetes    • Psoriasis    • Psoriatic arthritis (CMS/[facility])    • Trauma of chest      moving trailer, fell on her, several cracked ribs on left, feels it caused her umbo hernia recurrence.  had to be dug out.  Exhusband fx pelvis, mult surgeries.   • Vitamin D deficiency      Past Surgical History:   Procedure Laterality Date   • APPENDECTOMY     • CARDIAC CATHETERIZATION      done after discovery of MVP, also had a CHERISE, normal per patient   •  SECTION     • COLONOSCOPY     • CYSTOSCOPY BLADDER HYDRODISTENSION N/A 2017    Procedure: CYSTOSCOPY BLADDER HYDRODISTENSION;  Surgeon: Ion Herbert MD;  Location: [facility] OR;  Service:    • DIAGNOSTIC LAPAROSCOPY N/A 10/14/2016    Procedure: DIAGNOSTIC LAPAROSCOPY POSSIBLE RIGHT SALPINGOOPHORECTOMY;  Surgeon: Rory Owens DO;  Location: [facility] OR;  Service:    • ENDOSCOPY      Dr. Rich   • ENDOSCOPY N/A 2018    Procedure: ESOPHAGOGASTRODUODENOSCOPY;  Surgeon: Aneesh Mckeon MD;  [facility]: [facility]" [facility];  Service:    • FOOT SURGERY  1984   • GASTRIC SLEEVE LAPAROSCOPIC N/A 2/23/2018    Procedure: GASTRIC SLEEVE LAPAROSCOPIC;  Surgeon: Aneesh Mckeon MD;  Location:  [facility];  Service:    • HYSTERECTOMY  2008, 2016    laparoscopy supra-cervical hysterectomy 2008, 2016 cervix and 1 ovary removed.  Remaining ovary has a cyst. initially done for pelvic pain/fibroids   • LAPAROSCOPIC APPENDECTOMY  2015   • PARAESOPHAGEAL HERNIA REPAIR N/A 2/23/2018    Procedure: PARAESOPHAGEAL HERNIA REPAIR LAPAROSCOPIC;  Surgeon: Aneesh Mckeon MD;  Location:  [facility];  Service:    • SKIN BIOPSY     • STOMACH SURGERY      Gastric Sleeve    • TRACHELECTOMY N/A 10/14/2016    Procedure: TRACHELECTOMY VAGINAL;  Surgeon: Rory Owens DO;  Location:  [facility];  Service:    • TUBAL ABDOMINAL LIGATION  2000    LEFT OVARY REMAINS   • UMBILICAL HERNIA REPAIR N/A 10/14/2016    Procedure: UMBILICAL HERNIA REPAIR;  Surgeon: Spike Porter MD;  Location:  [facility];  Service:    • UMBILICAL HERNIA REPAIR N/A 12/9/2016    Procedure: UMBILICAL HERNIA REPAIR;  Surgeon: Spike Porter MD;  Location:  [facility];  with mesh, supraumbilical   • WISDOM TOOTH EXTRACTION  2000     Outpatient Medications Marked as Taking for the 9/19/19 encounter (Office Visit) with Valerie Ceballos PA-C   Medication Sig Dispense Refill   • albuterol sulfate  (90 Base) MCG/ACT inhaler Inhale 2 puffs Every 4 (Four) Hours As Needed for Wheezing or Shortness of Air. 1 inhaler 0   • fluticasone (FLONASE) 50 MCG/ACT nasal spray      • lisinopril (PRINIVIL,ZESTRIL) 20 MG tablet Take 20 mg by mouth Daily.     • loratadine (CLARITIN) 10 MG tablet      • vitamin D (ERGOCALCIFEROL) 67569 units capsule capsule Take 50,000 Units by mouth 1 (One) Time Per Week.         Allergies   Allergen Reactions   • Amlodipine GI Intolerance and Arrhythmia   • Morphine And Related Itching     Dilaudid ok, percocet/lortab ok   • Nifedipine GI Intolerance and  "Arrhythmia     Adalat, procardia   • Nsaids Unknown (See Comments)     Pt states she cannot take NSAIDs for a peroid of time after having her Gastric Sleeve Surgery    • Other Other (See Comments)     Pt states she cannot taken Steroids for some time after Gastric sleeve operation    • Adhesive Tape Rash     Can tolerate steristrips and skin glue   • Chlorhexidine Rash   • Diazepam Anxiety     \"reverse effect,\" \"makes me absolutely crazy\"   • Midazolam Anxiety   • Sulfa Antibiotics Rash       Social History     Socioeconomic History   • Marital status:      Spouse name: Not on file   • Number of children: Not on file   • Years of education: Not on file   • Highest education level: Not on file   Tobacco Use   • Smoking status: Former Smoker     Packs/day: 0.50     Years: 10.00     Pack years: 5.00     Types: Cigarettes     Last attempt to quit: 2013     Years since quittin.7   • Smokeless tobacco: Never Used   • Tobacco comment: Is around secondhand smoke occasionally in car but not in house.  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         /72 (BP Location: Left arm, Patient Position: Sitting, Cuff Size: Large Adult)   Pulse 69   Temp 97.1 °F (36.2 °C) (Temporal)   Resp 18   Ht 157.5 cm (62\")   Wt 82.3 kg (181 lb 8 oz)   LMP  (LMP Unknown) Comment: hysterectomy  SpO2 99%   BMI 33.20 kg/m²     Physical Exam   Constitutional: She appears well-developed and well-nourished.   HENT:   Head: Normocephalic and atraumatic.   Cardiovascular: Normal rate and regular rhythm.   Pulmonary/Chest: Effort normal and breath sounds normal.   Abdominal: Soft. Bowel sounds are normal. She exhibits no distension. There is tenderness (some TTP over superior umbilicus, no bulge or " deficits).   Neurological: She is alert.   Skin: Skin is warm and dry.   Psychiatric: She has a normal mood and affect. Her behavior is normal. Judgment and thought content normal.         Assessment:  19 months s/p LSG/para HHR by Dr. Mckeon 2/23/18    ICD-10-CM ICD-9-CM   1. Status post bariatric surgery Z98.84 V45.86   2. Gastroesophageal reflux disease, esophagitis presence not specified K21.9 530.81   3. Obesity, Class I, BMI 30-34.9 E66.9 278.00   4. Hypovitaminosis D E55.9 268.9   5. Screening, iron deficiency anemia Z13.0 V78.0   6. Malnutrition screen Z13.21 V77.2   7. Postgastrectomy malabsorption K91.2 579.3    Z90.3          Plan: Will send Rx omeprazole 40mg QD and zantac QHS prn or daily if needed.  Continue w/ good food choices and healthy habits.  Discussed goals, 1440 calories per BMR. Continue to focus on high protein, low carb.  Keep tracking intake.  Continue routine exercise.  Routine bariatric labs ordered.  Continue vitamins w/ adjustments pending lab results.  Call w/ problems/concerns.     The patient was instructed to follow up in 6 months, sooner if needed.      Total time spent w/ patient 25 minutes and 15 minutes spent counseling the patient on nutrition and necessary dietary/lifestyle modifications.

## 2019-09-23 LAB
25(OH)D3+25(OH)D2 SERPL-MCNC: 17.6 NG/ML (ref 30–100)
ALBUMIN SERPL-MCNC: 4.3 G/DL (ref 3.5–5.2)
ALBUMIN/GLOB SERPL: 1.5 G/DL
ALP SERPL-CCNC: 80 U/L (ref 39–117)
ALT SERPL-CCNC: 16 U/L (ref 1–33)
AST SERPL-CCNC: 12 U/L (ref 1–32)
BASOPHILS # BLD AUTO: 0.06 10*3/MM3 (ref 0–0.2)
BASOPHILS NFR BLD AUTO: 1 % (ref 0–1.5)
BILIRUB SERPL-MCNC: 0.4 MG/DL (ref 0.2–1.2)
BUN SERPL-MCNC: 12 MG/DL (ref 6–20)
BUN/CREAT SERPL: 18.5 (ref 7–25)
CALCIUM SERPL-MCNC: 9.2 MG/DL (ref 8.6–10.5)
CHLORIDE SERPL-SCNC: 102 MMOL/L (ref 98–107)
CO2 SERPL-SCNC: 25.6 MMOL/L (ref 22–29)
CREAT SERPL-MCNC: 0.65 MG/DL (ref 0.57–1)
EOSINOPHIL # BLD AUTO: 0.42 10*3/MM3 (ref 0–0.4)
EOSINOPHIL NFR BLD AUTO: 7.2 % (ref 0.3–6.2)
ERYTHROCYTE [DISTWIDTH] IN BLOOD BY AUTOMATED COUNT: 12.7 % (ref 12.3–15.4)
FERRITIN SERPL-MCNC: 108 NG/ML (ref 13–150)
FOLATE SERPL-MCNC: 6.98 NG/ML (ref 4.78–24.2)
GLOBULIN SER CALC-MCNC: 2.9 GM/DL
GLUCOSE SERPL-MCNC: 95 MG/DL (ref 65–99)
HCT VFR BLD AUTO: 44.6 % (ref 34–46.6)
HGB BLD-MCNC: 14.8 G/DL (ref 12–15.9)
IMM GRANULOCYTES # BLD AUTO: 0.01 10*3/MM3 (ref 0–0.05)
IMM GRANULOCYTES NFR BLD AUTO: 0.2 % (ref 0–0.5)
IRON SERPL-MCNC: 112 MCG/DL (ref 37–145)
LYMPHOCYTES # BLD AUTO: 2.63 10*3/MM3 (ref 0.7–3.1)
LYMPHOCYTES NFR BLD AUTO: 45 % (ref 19.6–45.3)
Lab: NORMAL
MCH RBC QN AUTO: 30.6 PG (ref 26.6–33)
MCHC RBC AUTO-ENTMCNC: 33.2 G/DL (ref 31.5–35.7)
MCV RBC AUTO: 92.1 FL (ref 79–97)
METHYLMALONATE SERPL-SCNC: 150 NMOL/L (ref 0–378)
MONOCYTES # BLD AUTO: 0.43 10*3/MM3 (ref 0.1–0.9)
MONOCYTES NFR BLD AUTO: 7.4 % (ref 5–12)
NEUTROPHILS # BLD AUTO: 2.29 10*3/MM3 (ref 1.7–7)
NEUTROPHILS NFR BLD AUTO: 39.2 % (ref 42.7–76)
NRBC BLD AUTO-RTO: 0 /100 WBC (ref 0–0.2)
PLATELET # BLD AUTO: 350 10*3/MM3 (ref 140–450)
POTASSIUM SERPL-SCNC: 4.8 MMOL/L (ref 3.5–5.2)
PREALB SERPL-MCNC: 24 MG/DL (ref 12–34)
PROT SERPL-MCNC: 7.2 G/DL (ref 6–8.5)
RBC # BLD AUTO: 4.84 10*6/MM3 (ref 3.77–5.28)
SODIUM SERPL-SCNC: 139 MMOL/L (ref 136–145)
VIT B1 BLD-SCNC: 132.2 NMOL/L (ref 66.5–200)
WBC # BLD AUTO: 5.84 10*3/MM3 (ref 3.4–10.8)

## 2019-10-15 ENCOUNTER — TRANSCRIBE ORDERS (OUTPATIENT)
Dept: ADMINISTRATIVE | Facility: HOSPITAL | Age: 49
End: 2019-10-15

## 2019-10-15 DIAGNOSIS — R00.2 PALPITATIONS: Primary | ICD-10-CM

## 2019-10-21 ENCOUNTER — HOSPITAL ENCOUNTER (OUTPATIENT)
Dept: CARDIOLOGY | Facility: HOSPITAL | Age: 49
Discharge: HOME | End: 2019-10-21
Admitting: NURSE PRACTITIONER

## 2019-10-21 DIAGNOSIS — R00.2 PALPITATIONS: ICD-10-CM

## 2019-10-21 LAB
BH CV ECHO MEAS - % IVS THICK: 123.4 %
BH CV ECHO MEAS - % LVPW THICK: 109.9 %
BH CV ECHO MEAS - ACS: 2.1 CM
BH CV ECHO MEAS - AO ROOT AREA (BSA CORRECTED): 1.5
BH CV ECHO MEAS - AO ROOT AREA: 6.3 CM^2
BH CV ECHO MEAS - AO ROOT DIAM: 2.8 CM
BH CV ECHO MEAS - BSA(HAYCOCK): 1.9 M^2
BH CV ECHO MEAS - BSA: 1.8 M^2
BH CV ECHO MEAS - BZI_BMI: 33.1 KILOGRAMS/M^2
BH CV ECHO MEAS - BZI_METRIC_HEIGHT: 157.5 CM
BH CV ECHO MEAS - BZI_METRIC_WEIGHT: 82.1 KG
BH CV ECHO MEAS - EDV(CUBED): 126.2 ML
BH CV ECHO MEAS - EDV(MOD-SP4): 108 ML
BH CV ECHO MEAS - EDV(TEICH): 119.1 ML
BH CV ECHO MEAS - EF(CUBED): 84.7 %
BH CV ECHO MEAS - EF(MOD-SP4): 79.6 %
BH CV ECHO MEAS - EF(TEICH): 77.7 %
BH CV ECHO MEAS - ESV(CUBED): 19.3 ML
BH CV ECHO MEAS - ESV(MOD-SP4): 22 ML
BH CV ECHO MEAS - ESV(TEICH): 26.6 ML
BH CV ECHO MEAS - FS: 46.5 %
BH CV ECHO MEAS - IVS/LVPW: 0.92
BH CV ECHO MEAS - IVSD: 0.94 CM
BH CV ECHO MEAS - IVSS: 2.1 CM
BH CV ECHO MEAS - LA DIMENSION: 4.9 CM
BH CV ECHO MEAS - LA/AO: 1.7
BH CV ECHO MEAS - LV DIASTOLIC VOL/BSA (35-75): 58.9 ML/M^2
BH CV ECHO MEAS - LV MASS(C)D: 176.7 GRAMS
BH CV ECHO MEAS - LV MASS(C)DI: 96.4 GRAMS/M^2
BH CV ECHO MEAS - LV MASS(C)S: 257.8 GRAMS
BH CV ECHO MEAS - LV MASS(C)SI: 140.7 GRAMS/M^2
BH CV ECHO MEAS - LV SYSTOLIC VOL/BSA (12-30): 12 ML/M^2
BH CV ECHO MEAS - LVIDD: 5 CM
BH CV ECHO MEAS - LVIDS: 2.7 CM
BH CV ECHO MEAS - LVLD AP4: 9 CM
BH CV ECHO MEAS - LVLS AP4: 7.4 CM
BH CV ECHO MEAS - LVOT AREA (M): 2.3 CM^2
BH CV ECHO MEAS - LVOT AREA: 2.4 CM^2
BH CV ECHO MEAS - LVOT DIAM: 1.7 CM
BH CV ECHO MEAS - LVPWD: 1 CM
BH CV ECHO MEAS - LVPWS: 2.1 CM
BH CV ECHO MEAS - MV A MAX VEL: 113.5 CM/SEC
BH CV ECHO MEAS - MV E MAX VEL: 110.8 CM/SEC
BH CV ECHO MEAS - MV E/A: 0.98
BH CV ECHO MEAS - PA ACC SLOPE: 837.1 CM/SEC^2
BH CV ECHO MEAS - PA ACC TIME: 0.12 SEC
BH CV ECHO MEAS - PA PR(ACCEL): 23.5 MMHG
BH CV ECHO MEAS - RAP SYSTOLE: 10 MMHG
BH CV ECHO MEAS - RVDD: 2.4 CM
BH CV ECHO MEAS - RVSP: 43.8 MMHG
BH CV ECHO MEAS - SI(CUBED): 58.3 ML/M^2
BH CV ECHO MEAS - SI(MOD-SP4): 46.9 ML/M^2
BH CV ECHO MEAS - SI(TEICH): 50.5 ML/M^2
BH CV ECHO MEAS - SV(CUBED): 106.9 ML
BH CV ECHO MEAS - SV(MOD-SP4): 86 ML
BH CV ECHO MEAS - SV(TEICH): 92.5 ML
BH CV ECHO MEAS - TR MAX VEL: 290.5 CM/SEC
MAXIMAL PREDICTED HEART RATE: 173 BPM
STRESS TARGET HR: 147 BPM

## 2019-10-21 PROCEDURE — 93306 TTE W/DOPPLER COMPLETE: CPT

## 2019-10-29 ENCOUNTER — TRANSCRIBE ORDERS (OUTPATIENT)
Dept: ADMINISTRATIVE | Facility: HOSPITAL | Age: 49
End: 2019-10-29

## 2019-10-29 DIAGNOSIS — E89.41 SURGICAL MENOPAUSE, SYMPTOMATIC: Primary | ICD-10-CM

## 2019-12-13 ENCOUNTER — HOSPITAL ENCOUNTER (OUTPATIENT)
Dept: MAMMOGRAPHY | Facility: HOSPITAL | Age: 49
Discharge: HOME | End: 2019-12-13

## 2019-12-13 ENCOUNTER — HOSPITAL ENCOUNTER (OUTPATIENT)
Dept: BONE DENSITY | Facility: HOSPITAL | Age: 49
Discharge: HOME | End: 2019-12-13
Admitting: NURSE PRACTITIONER

## 2019-12-13 DIAGNOSIS — M81.0 AGE-RELATED OSTEOPOROSIS WITHOUT CURRENT PATHOLOGICAL FRACTURE: ICD-10-CM

## 2019-12-13 DIAGNOSIS — Z12.31 VISIT FOR SCREENING MAMMOGRAM: ICD-10-CM

## 2019-12-13 PROCEDURE — 77063 BREAST TOMOSYNTHESIS BI: CPT | Performed by: RADIOLOGY

## 2019-12-13 PROCEDURE — 77067 SCR MAMMO BI INCL CAD: CPT | Performed by: RADIOLOGY

## 2019-12-13 PROCEDURE — 77063 BREAST TOMOSYNTHESIS BI: CPT

## 2019-12-13 PROCEDURE — 77067 SCR MAMMO BI INCL CAD: CPT

## 2019-12-13 PROCEDURE — 77080 DXA BONE DENSITY AXIAL: CPT

## 2019-12-13 PROCEDURE — 77080 DXA BONE DENSITY AXIAL: CPT | Performed by: RADIOLOGY

## 2020-01-27 ENCOUNTER — APPOINTMENT (OUTPATIENT)
Dept: GENERAL RADIOLOGY | Facility: HOSPITAL | Age: 50
End: 2020-01-27

## 2020-01-27 ENCOUNTER — HOSPITAL ENCOUNTER (OUTPATIENT)
Facility: HOSPITAL | Age: 50
Setting detail: OBSERVATION
Discharge: HOME | End: 2020-01-28
Attending: FAMILY MEDICINE | Admitting: INTERNAL MEDICINE

## 2020-01-27 DIAGNOSIS — R07.9 CHEST PAIN, UNSPECIFIED TYPE: Primary | ICD-10-CM

## 2020-01-27 DIAGNOSIS — I10 HYPERTENSION, UNSPECIFIED TYPE: ICD-10-CM

## 2020-01-27 LAB
ALBUMIN SERPL-MCNC: 4.02 G/DL (ref 3.5–5.2)
ALBUMIN/GLOB SERPL: 1.3 G/DL
ALP SERPL-CCNC: 88 U/L (ref 39–117)
ALT SERPL W P-5'-P-CCNC: 20 U/L (ref 1–33)
ANION GAP SERPL CALCULATED.3IONS-SCNC: 10.5 MMOL/L (ref 5–15)
AST SERPL-CCNC: 18 U/L (ref 1–32)
BASOPHILS # BLD AUTO: 0.04 10*3/MM3 (ref 0–0.2)
BASOPHILS NFR BLD AUTO: 0.6 % (ref 0–1.5)
BILIRUB SERPL-MCNC: 0.2 MG/DL (ref 0.2–1.2)
BUN BLD-MCNC: 13 MG/DL (ref 6–20)
BUN/CREAT SERPL: 25.5 (ref 7–25)
CALCIUM SPEC-SCNC: 8.7 MG/DL (ref 8.6–10.5)
CHLORIDE SERPL-SCNC: 103 MMOL/L (ref 98–107)
CO2 SERPL-SCNC: 24.5 MMOL/L (ref 22–29)
CREAT BLD-MCNC: 0.51 MG/DL (ref 0.57–1)
D DIMER PPP FEU-MCNC: 0.39 MCGFEU/ML (ref 0–0.5)
DEPRECATED RDW RBC AUTO: 43.1 FL (ref 37–54)
EOSINOPHIL # BLD AUTO: 0.42 10*3/MM3 (ref 0–0.4)
EOSINOPHIL NFR BLD AUTO: 6.4 % (ref 0.3–6.2)
ERYTHROCYTE [DISTWIDTH] IN BLOOD BY AUTOMATED COUNT: 12.7 % (ref 12.3–15.4)
GFR SERPL CREATININE-BSD FRML MDRD: 129 ML/MIN/1.73
GLOBULIN UR ELPH-MCNC: 3 GM/DL
GLUCOSE BLD-MCNC: 94 MG/DL (ref 65–99)
HCT VFR BLD AUTO: 42.9 % (ref 34–46.6)
HGB BLD-MCNC: 14.1 G/DL (ref 12–15.9)
HOLD SPECIMEN: NORMAL
HOLD SPECIMEN: NORMAL
IMM GRANULOCYTES # BLD AUTO: 0.01 10*3/MM3 (ref 0–0.05)
IMM GRANULOCYTES NFR BLD AUTO: 0.2 % (ref 0–0.5)
LYMPHOCYTES # BLD AUTO: 2.74 10*3/MM3 (ref 0.7–3.1)
LYMPHOCYTES NFR BLD AUTO: 42 % (ref 19.6–45.3)
MCH RBC QN AUTO: 30.4 PG (ref 26.6–33)
MCHC RBC AUTO-ENTMCNC: 32.9 G/DL (ref 31.5–35.7)
MCV RBC AUTO: 92.5 FL (ref 79–97)
MONOCYTES # BLD AUTO: 0.5 10*3/MM3 (ref 0.1–0.9)
MONOCYTES NFR BLD AUTO: 7.7 % (ref 5–12)
NEUTROPHILS # BLD AUTO: 2.82 10*3/MM3 (ref 1.7–7)
NEUTROPHILS NFR BLD AUTO: 43.1 % (ref 42.7–76)
NRBC BLD AUTO-RTO: 0 /100 WBC (ref 0–0.2)
NT-PROBNP SERPL-MCNC: 167.7 PG/ML (ref 5–450)
PLATELET # BLD AUTO: 302 10*3/MM3 (ref 140–450)
PMV BLD AUTO: 11.5 FL (ref 6–12)
POTASSIUM BLD-SCNC: 4 MMOL/L (ref 3.5–5.2)
PROT SERPL-MCNC: 7 G/DL (ref 6–8.5)
RBC # BLD AUTO: 4.64 10*6/MM3 (ref 3.77–5.28)
SODIUM BLD-SCNC: 138 MMOL/L (ref 136–145)
TROPONIN T SERPL-MCNC: <0.01 NG/ML (ref 0–0.03)
WBC NRBC COR # BLD: 6.53 10*3/MM3 (ref 3.4–10.8)
WHOLE BLOOD HOLD SPECIMEN: NORMAL
WHOLE BLOOD HOLD SPECIMEN: NORMAL

## 2020-01-27 PROCEDURE — 84484 ASSAY OF TROPONIN QUANT: CPT | Performed by: NURSE PRACTITIONER

## 2020-01-27 PROCEDURE — 99220 PR INITIAL OBSERVATION CARE/DAY 70 MINUTES: CPT | Performed by: INTERNAL MEDICINE

## 2020-01-27 PROCEDURE — 80053 COMPREHEN METABOLIC PANEL: CPT | Performed by: FAMILY MEDICINE

## 2020-01-27 PROCEDURE — 25010000002 HEPARIN (PORCINE) PER 1000 UNITS: Performed by: INTERNAL MEDICINE

## 2020-01-27 PROCEDURE — G0378 HOSPITAL OBSERVATION PER HR: HCPCS

## 2020-01-27 PROCEDURE — 83880 ASSAY OF NATRIURETIC PEPTIDE: CPT | Performed by: FAMILY MEDICINE

## 2020-01-27 PROCEDURE — 84484 ASSAY OF TROPONIN QUANT: CPT | Performed by: FAMILY MEDICINE

## 2020-01-27 PROCEDURE — 85025 COMPLETE CBC W/AUTO DIFF WBC: CPT | Performed by: FAMILY MEDICINE

## 2020-01-27 PROCEDURE — 71045 X-RAY EXAM CHEST 1 VIEW: CPT | Performed by: RADIOLOGY

## 2020-01-27 PROCEDURE — 94799 UNLISTED PULMONARY SVC/PX: CPT

## 2020-01-27 PROCEDURE — 99284 EMERGENCY DEPT VISIT MOD MDM: CPT

## 2020-01-27 PROCEDURE — 85379 FIBRIN DEGRADATION QUANT: CPT | Performed by: FAMILY MEDICINE

## 2020-01-27 PROCEDURE — 96372 THER/PROPH/DIAG INJ SC/IM: CPT

## 2020-01-27 PROCEDURE — 71045 X-RAY EXAM CHEST 1 VIEW: CPT

## 2020-01-27 PROCEDURE — 93005 ELECTROCARDIOGRAM TRACING: CPT | Performed by: FAMILY MEDICINE

## 2020-01-27 RX ORDER — LISINOPRIL 10 MG/1
10 TABLET ORAL DAILY
Status: DISCONTINUED | OUTPATIENT
Start: 2020-01-27 | End: 2020-01-28

## 2020-01-27 RX ORDER — LISINOPRIL 10 MG/1
10 TABLET ORAL DAILY
COMMUNITY
End: 2020-01-28 | Stop reason: HOSPADM

## 2020-01-27 RX ORDER — PANTOPRAZOLE SODIUM 40 MG/1
40 TABLET, DELAYED RELEASE ORAL
Status: DISCONTINUED | OUTPATIENT
Start: 2020-01-27 | End: 2020-01-28 | Stop reason: HOSPADM

## 2020-01-27 RX ORDER — FAMOTIDINE 20 MG/1
20 TABLET, FILM COATED ORAL NIGHTLY
Status: DISCONTINUED | OUTPATIENT
Start: 2020-01-27 | End: 2020-01-27

## 2020-01-27 RX ORDER — RANITIDINE 150 MG/1
150 TABLET ORAL NIGHTLY
COMMUNITY
End: 2020-01-28 | Stop reason: HOSPADM

## 2020-01-27 RX ORDER — NITROGLYCERIN 0.4 MG/1
0.4 TABLET SUBLINGUAL
Status: DISCONTINUED | OUTPATIENT
Start: 2020-01-27 | End: 2020-01-27 | Stop reason: SDUPTHER

## 2020-01-27 RX ORDER — SODIUM CHLORIDE 0.9 % (FLUSH) 0.9 %
10 SYRINGE (ML) INJECTION AS NEEDED
Status: DISCONTINUED | OUTPATIENT
Start: 2020-01-27 | End: 2020-01-28 | Stop reason: HOSPADM

## 2020-01-27 RX ORDER — ALBUTEROL SULFATE 2.5 MG/3ML
2.5 SOLUTION RESPIRATORY (INHALATION) EVERY 4 HOURS PRN
Status: DISCONTINUED | OUTPATIENT
Start: 2020-01-27 | End: 2020-01-28 | Stop reason: HOSPADM

## 2020-01-27 RX ORDER — PANTOPRAZOLE SODIUM 40 MG/1
40 TABLET, DELAYED RELEASE ORAL EVERY MORNING
Status: DISCONTINUED | OUTPATIENT
Start: 2020-01-28 | End: 2020-01-27

## 2020-01-27 RX ORDER — HYDRALAZINE HYDROCHLORIDE 20 MG/ML
10 INJECTION INTRAMUSCULAR; INTRAVENOUS ONCE
Status: DISCONTINUED | OUTPATIENT
Start: 2020-01-27 | End: 2020-01-27

## 2020-01-27 RX ORDER — ACETAMINOPHEN 325 MG/1
650 TABLET ORAL EVERY 6 HOURS PRN
Status: DISCONTINUED | OUTPATIENT
Start: 2020-01-27 | End: 2020-01-28 | Stop reason: HOSPADM

## 2020-01-27 RX ORDER — NITROGLYCERIN 0.4 MG/1
0.4 TABLET SUBLINGUAL
Status: DISCONTINUED | OUTPATIENT
Start: 2020-01-27 | End: 2020-01-28 | Stop reason: HOSPADM

## 2020-01-27 RX ORDER — SODIUM CHLORIDE 0.9 % (FLUSH) 0.9 %
10 SYRINGE (ML) INJECTION EVERY 12 HOURS SCHEDULED
Status: DISCONTINUED | OUTPATIENT
Start: 2020-01-27 | End: 2020-01-28 | Stop reason: HOSPADM

## 2020-01-27 RX ORDER — ASPIRIN 81 MG/1
324 TABLET, CHEWABLE ORAL ONCE
Status: COMPLETED | OUTPATIENT
Start: 2020-01-27 | End: 2020-01-27

## 2020-01-27 RX ORDER — HEPARIN SODIUM 5000 [USP'U]/ML
5000 INJECTION, SOLUTION INTRAVENOUS; SUBCUTANEOUS EVERY 8 HOURS SCHEDULED
Status: DISCONTINUED | OUTPATIENT
Start: 2020-01-27 | End: 2020-01-28 | Stop reason: HOSPADM

## 2020-01-27 RX ADMIN — ACETAMINOPHEN 650 MG: 325 TABLET ORAL at 19:59

## 2020-01-27 RX ADMIN — PANTOPRAZOLE SODIUM 40 MG: 40 TABLET, DELAYED RELEASE ORAL at 19:59

## 2020-01-27 RX ADMIN — ASPIRIN 324 MG: 81 TABLET, CHEWABLE ORAL at 13:33

## 2020-01-27 RX ADMIN — SODIUM CHLORIDE, PRESERVATIVE FREE 10 ML: 5 INJECTION INTRAVENOUS at 20:00

## 2020-01-27 RX ADMIN — HEPARIN SODIUM 5000 UNITS: 5000 INJECTION INTRAVENOUS; SUBCUTANEOUS at 20:48

## 2020-01-27 RX ADMIN — NITROGLYCERIN 1 INCH: 20 OINTMENT TOPICAL at 14:34

## 2020-01-27 RX ADMIN — LISINOPRIL 10 MG: 10 TABLET ORAL at 19:59

## 2020-01-28 ENCOUNTER — APPOINTMENT (OUTPATIENT)
Dept: NUCLEAR MEDICINE | Facility: HOSPITAL | Age: 50
End: 2020-01-28

## 2020-01-28 ENCOUNTER — APPOINTMENT (OUTPATIENT)
Dept: CARDIOLOGY | Facility: HOSPITAL | Age: 50
End: 2020-01-28

## 2020-01-28 VITALS
WEIGHT: 190.2 LBS | OXYGEN SATURATION: 97 % | BODY MASS INDEX: 35 KG/M2 | HEART RATE: 72 BPM | HEIGHT: 62 IN | RESPIRATION RATE: 18 BRPM | TEMPERATURE: 98 F | SYSTOLIC BLOOD PRESSURE: 128 MMHG | DIASTOLIC BLOOD PRESSURE: 76 MMHG

## 2020-01-28 LAB
ALBUMIN SERPL-MCNC: 3.65 G/DL (ref 3.5–5.2)
ALBUMIN/GLOB SERPL: 1.3 G/DL
ALP SERPL-CCNC: 85 U/L (ref 39–117)
ALT SERPL W P-5'-P-CCNC: 15 U/L (ref 1–33)
ANION GAP SERPL CALCULATED.3IONS-SCNC: 10.1 MMOL/L (ref 5–15)
AST SERPL-CCNC: 14 U/L (ref 1–32)
BASOPHILS # BLD AUTO: 0.04 10*3/MM3 (ref 0–0.2)
BASOPHILS NFR BLD AUTO: 0.6 % (ref 0–1.5)
BH CV ECHO MEAS - % IVS THICK: 47.6 %
BH CV ECHO MEAS - % LVPW THICK: 47.5 %
BH CV ECHO MEAS - ACS: 2.1 CM
BH CV ECHO MEAS - AO MAX PG: 12.3 MMHG
BH CV ECHO MEAS - AO MEAN PG: 5.1 MMHG
BH CV ECHO MEAS - AO ROOT AREA (BSA CORRECTED): 1.7
BH CV ECHO MEAS - AO ROOT AREA: 7.6 CM^2
BH CV ECHO MEAS - AO ROOT DIAM: 3.1 CM
BH CV ECHO MEAS - AO V2 MAX: 175.4 CM/SEC
BH CV ECHO MEAS - AO V2 MEAN: 104.5 CM/SEC
BH CV ECHO MEAS - AO V2 VTI: 37.5 CM
BH CV ECHO MEAS - BSA(HAYCOCK): 2 M^2
BH CV ECHO MEAS - BSA: 1.9 M^2
BH CV ECHO MEAS - BZI_BMI: 34.8 KILOGRAMS/M^2
BH CV ECHO MEAS - BZI_METRIC_HEIGHT: 157.5 CM
BH CV ECHO MEAS - BZI_METRIC_WEIGHT: 86.2 KG
BH CV ECHO MEAS - EDV(CUBED): 95.5 ML
BH CV ECHO MEAS - EDV(MOD-SP4): 78 ML
BH CV ECHO MEAS - EDV(TEICH): 95.9 ML
BH CV ECHO MEAS - EF(CUBED): 67.3 %
BH CV ECHO MEAS - EF(MOD-SP4): 59 %
BH CV ECHO MEAS - EF(TEICH): 58.9 %
BH CV ECHO MEAS - ESV(CUBED): 31.3 ML
BH CV ECHO MEAS - ESV(MOD-SP4): 32 ML
BH CV ECHO MEAS - ESV(TEICH): 39.4 ML
BH CV ECHO MEAS - FS: 31.1 %
BH CV ECHO MEAS - IVS/LVPW: 0.83
BH CV ECHO MEAS - IVSD: 1 CM
BH CV ECHO MEAS - IVSS: 1.5 CM
BH CV ECHO MEAS - LA DIMENSION: 3.8 CM
BH CV ECHO MEAS - LA/AO: 1.2
BH CV ECHO MEAS - LV DIASTOLIC VOL/BSA (35-75): 41.7 ML/M^2
BH CV ECHO MEAS - LV MASS(C)D: 187 GRAMS
BH CV ECHO MEAS - LV MASS(C)DI: 100 GRAMS/M^2
BH CV ECHO MEAS - LV MASS(C)S: 202.3 GRAMS
BH CV ECHO MEAS - LV MASS(C)SI: 108.2 GRAMS/M^2
BH CV ECHO MEAS - LV SYSTOLIC VOL/BSA (12-30): 17.1 ML/M^2
BH CV ECHO MEAS - LVIDD: 4.6 CM
BH CV ECHO MEAS - LVIDS: 3.2 CM
BH CV ECHO MEAS - LVLD AP4: 8.1 CM
BH CV ECHO MEAS - LVLS AP4: 7.1 CM
BH CV ECHO MEAS - LVOT AREA (M): 3.1 CM^2
BH CV ECHO MEAS - LVOT AREA: 3.1 CM^2
BH CV ECHO MEAS - LVOT DIAM: 2 CM
BH CV ECHO MEAS - LVPWD: 1.2 CM
BH CV ECHO MEAS - LVPWS: 1.8 CM
BH CV ECHO MEAS - MV A MAX VEL: 88.8 CM/SEC
BH CV ECHO MEAS - MV E MAX VEL: 106.1 CM/SEC
BH CV ECHO MEAS - MV E/A: 1.2
BH CV ECHO MEAS - PA ACC SLOPE: 1028 CM/SEC^2
BH CV ECHO MEAS - PA ACC TIME: 0.11 SEC
BH CV ECHO MEAS - PA PR(ACCEL): 31.5 MMHG
BH CV ECHO MEAS - RAP SYSTOLE: 10 MMHG
BH CV ECHO MEAS - RVSP: 34.1 MMHG
BH CV ECHO MEAS - SI(AO): 152.3 ML/M^2
BH CV ECHO MEAS - SI(CUBED): 34.3 ML/M^2
BH CV ECHO MEAS - SI(MOD-SP4): 24.6 ML/M^2
BH CV ECHO MEAS - SI(TEICH): 30.2 ML/M^2
BH CV ECHO MEAS - SV(AO): 284.9 ML
BH CV ECHO MEAS - SV(CUBED): 64.2 ML
BH CV ECHO MEAS - SV(MOD-SP4): 46 ML
BH CV ECHO MEAS - SV(TEICH): 56.5 ML
BH CV ECHO MEAS - TR MAX VEL: 245.7 CM/SEC
BH CV NUCLEAR PRIOR STUDY: 3
BH CV STRESS BP STAGE 1: NORMAL
BH CV STRESS BP STAGE 2: NORMAL
BH CV STRESS COMMENTS STAGE 1: NORMAL
BH CV STRESS COMMENTS STAGE 2: NORMAL
BH CV STRESS DOSE REGADENOSON STAGE 1: 0.4
BH CV STRESS DURATION MIN STAGE 1: 0
BH CV STRESS DURATION MIN STAGE 2: 4
BH CV STRESS DURATION SEC STAGE 1: 10
BH CV STRESS DURATION SEC STAGE 2: 0
BH CV STRESS HR STAGE 1: 124
BH CV STRESS HR STAGE 2: 60
BH CV STRESS PROTOCOL 1: NORMAL
BH CV STRESS RECOVERY BP: NORMAL MMHG
BH CV STRESS RECOVERY HR: 60 BPM
BH CV STRESS STAGE 1: 1
BH CV STRESS STAGE 2: 2
BILIRUB SERPL-MCNC: 0.2 MG/DL (ref 0.2–1.2)
BUN BLD-MCNC: 16 MG/DL (ref 6–20)
BUN/CREAT SERPL: 26.7 (ref 7–25)
CALCIUM SPEC-SCNC: 8.5 MG/DL (ref 8.6–10.5)
CHLORIDE SERPL-SCNC: 103 MMOL/L (ref 98–107)
CHOLEST SERPL-MCNC: 172 MG/DL (ref 0–200)
CO2 SERPL-SCNC: 21.9 MMOL/L (ref 22–29)
CREAT BLD-MCNC: 0.6 MG/DL (ref 0.57–1)
DEPRECATED RDW RBC AUTO: 43.5 FL (ref 37–54)
EOSINOPHIL # BLD AUTO: 0.44 10*3/MM3 (ref 0–0.4)
EOSINOPHIL NFR BLD AUTO: 6.5 % (ref 0.3–6.2)
ERYTHROCYTE [DISTWIDTH] IN BLOOD BY AUTOMATED COUNT: 12.6 % (ref 12.3–15.4)
GFR SERPL CREATININE-BSD FRML MDRD: 107 ML/MIN/1.73
GLOBULIN UR ELPH-MCNC: 2.9 GM/DL
GLUCOSE BLD-MCNC: 102 MG/DL (ref 65–99)
HCT VFR BLD AUTO: 39.3 % (ref 34–46.6)
HDLC SERPL-MCNC: 57 MG/DL (ref 40–60)
HGB BLD-MCNC: 12.4 G/DL (ref 12–15.9)
IMM GRANULOCYTES # BLD AUTO: 0.01 10*3/MM3 (ref 0–0.05)
IMM GRANULOCYTES NFR BLD AUTO: 0.1 % (ref 0–0.5)
LDLC SERPL CALC-MCNC: 101 MG/DL (ref 0–100)
LDLC/HDLC SERPL: 1.78 {RATIO}
LV EF NUC BP: 68 %
LYMPHOCYTES # BLD AUTO: 3.3 10*3/MM3 (ref 0.7–3.1)
LYMPHOCYTES NFR BLD AUTO: 49.1 % (ref 19.6–45.3)
MAXIMAL PREDICTED HEART RATE: 173 BPM
MAXIMAL PREDICTED HEART RATE: 173 BPM
MCH RBC QN AUTO: 29.7 PG (ref 26.6–33)
MCHC RBC AUTO-ENTMCNC: 31.6 G/DL (ref 31.5–35.7)
MCV RBC AUTO: 94 FL (ref 79–97)
MONOCYTES # BLD AUTO: 0.51 10*3/MM3 (ref 0.1–0.9)
MONOCYTES NFR BLD AUTO: 7.6 % (ref 5–12)
NEUTROPHILS # BLD AUTO: 2.42 10*3/MM3 (ref 1.7–7)
NEUTROPHILS NFR BLD AUTO: 36.1 % (ref 42.7–76)
NRBC BLD AUTO-RTO: 0 /100 WBC (ref 0–0.2)
PERCENT MAX PREDICTED HR: 71.68 %
PLATELET # BLD AUTO: 258 10*3/MM3 (ref 140–450)
PMV BLD AUTO: 11.4 FL (ref 6–12)
POTASSIUM BLD-SCNC: 4.1 MMOL/L (ref 3.5–5.2)
PROT SERPL-MCNC: 6.5 G/DL (ref 6–8.5)
RBC # BLD AUTO: 4.18 10*6/MM3 (ref 3.77–5.28)
SODIUM BLD-SCNC: 135 MMOL/L (ref 136–145)
STRESS BASELINE BP: NORMAL MMHG
STRESS BASELINE HR: 63 BPM
STRESS PERCENT HR: 84 %
STRESS POST PEAK BP: NORMAL MMHG
STRESS POST PEAK HR: 124 BPM
STRESS TARGET HR: 147 BPM
STRESS TARGET HR: 147 BPM
TRIGL SERPL-MCNC: 69 MG/DL (ref 0–150)
TROPONIN T SERPL-MCNC: <0.01 NG/ML (ref 0–0.03)
TROPONIN T SERPL-MCNC: <0.01 NG/ML (ref 0–0.03)
VLDLC SERPL-MCNC: 13.8 MG/DL
WBC NRBC COR # BLD: 6.72 10*3/MM3 (ref 3.4–10.8)

## 2020-01-28 PROCEDURE — 25010000002 HEPARIN (PORCINE) PER 1000 UNITS: Performed by: INTERNAL MEDICINE

## 2020-01-28 PROCEDURE — G0378 HOSPITAL OBSERVATION PER HR: HCPCS

## 2020-01-28 PROCEDURE — 80053 COMPREHEN METABOLIC PANEL: CPT | Performed by: INTERNAL MEDICINE

## 2020-01-28 PROCEDURE — 93306 TTE W/DOPPLER COMPLETE: CPT | Performed by: INTERNAL MEDICINE

## 2020-01-28 PROCEDURE — 93005 ELECTROCARDIOGRAM TRACING: CPT | Performed by: INTERNAL MEDICINE

## 2020-01-28 PROCEDURE — A9500 TC99M SESTAMIBI: HCPCS | Performed by: INTERNAL MEDICINE

## 2020-01-28 PROCEDURE — 93017 CV STRESS TEST TRACING ONLY: CPT

## 2020-01-28 PROCEDURE — 25010000002 REGADENOSON 0.4 MG/5ML SOLUTION: Performed by: INTERNAL MEDICINE

## 2020-01-28 PROCEDURE — 0 TECHNETIUM SESTAMIBI: Performed by: INTERNAL MEDICINE

## 2020-01-28 PROCEDURE — 96372 THER/PROPH/DIAG INJ SC/IM: CPT

## 2020-01-28 PROCEDURE — 80061 LIPID PANEL: CPT | Performed by: NURSE PRACTITIONER

## 2020-01-28 PROCEDURE — 78452 HT MUSCLE IMAGE SPECT MULT: CPT | Performed by: INTERNAL MEDICINE

## 2020-01-28 PROCEDURE — 84484 ASSAY OF TROPONIN QUANT: CPT | Performed by: NURSE PRACTITIONER

## 2020-01-28 PROCEDURE — 93018 CV STRESS TEST I&R ONLY: CPT | Performed by: INTERNAL MEDICINE

## 2020-01-28 PROCEDURE — 85025 COMPLETE CBC W/AUTO DIFF WBC: CPT | Performed by: NURSE PRACTITIONER

## 2020-01-28 PROCEDURE — 93306 TTE W/DOPPLER COMPLETE: CPT

## 2020-01-28 PROCEDURE — 93010 ELECTROCARDIOGRAM REPORT: CPT | Performed by: SPECIALIST

## 2020-01-28 PROCEDURE — 94799 UNLISTED PULMONARY SVC/PX: CPT

## 2020-01-28 PROCEDURE — 99217 PR OBSERVATION CARE DISCHARGE MANAGEMENT: CPT | Performed by: INTERNAL MEDICINE

## 2020-01-28 PROCEDURE — 78452 HT MUSCLE IMAGE SPECT MULT: CPT

## 2020-01-28 PROCEDURE — 25010000002 AMINOPHYLLINE PER 250 MG: Performed by: NURSE PRACTITIONER

## 2020-01-28 RX ORDER — LISINOPRIL 20 MG/1
20 TABLET ORAL DAILY
Qty: 30 TABLET | Refills: 0 | Status: SHIPPED | OUTPATIENT
Start: 2020-01-29 | End: 2020-02-28

## 2020-01-28 RX ORDER — PANTOPRAZOLE SODIUM 40 MG/1
40 TABLET, DELAYED RELEASE ORAL
Qty: 60 TABLET | Refills: 0 | Status: SHIPPED | OUTPATIENT
Start: 2020-01-29 | End: 2020-02-28

## 2020-01-28 RX ORDER — LISINOPRIL 10 MG/1
20 TABLET ORAL DAILY
Status: DISCONTINUED | OUTPATIENT
Start: 2020-01-29 | End: 2020-01-28 | Stop reason: HOSPADM

## 2020-01-28 RX ORDER — PANTOPRAZOLE SODIUM 40 MG/1
40 TABLET, DELAYED RELEASE ORAL
Qty: 60 TABLET | Refills: 0 | Status: SHIPPED | OUTPATIENT
Start: 2020-01-29 | End: 2020-01-28

## 2020-01-28 RX ORDER — LISINOPRIL 20 MG/1
20 TABLET ORAL DAILY
Qty: 30 TABLET | Refills: 0 | Status: SHIPPED | OUTPATIENT
Start: 2020-01-29 | End: 2020-01-28

## 2020-01-28 RX ORDER — AMINOPHYLLINE DIHYDRATE 25 MG/ML
125 INJECTION, SOLUTION INTRAVENOUS
Status: COMPLETED | OUTPATIENT
Start: 2020-01-28 | End: 2020-01-28

## 2020-01-28 RX ADMIN — AMINOPHYLLINE 125 MG: 25 INJECTION, SOLUTION INTRAVENOUS at 10:31

## 2020-01-28 RX ADMIN — PANTOPRAZOLE SODIUM 40 MG: 40 TABLET, DELAYED RELEASE ORAL at 16:59

## 2020-01-28 RX ADMIN — PANTOPRAZOLE SODIUM 40 MG: 40 TABLET, DELAYED RELEASE ORAL at 12:41

## 2020-01-28 RX ADMIN — LISINOPRIL 10 MG: 10 TABLET ORAL at 12:41

## 2020-01-28 RX ADMIN — HEPARIN SODIUM 5000 UNITS: 5000 INJECTION INTRAVENOUS; SUBCUTANEOUS at 05:37

## 2020-01-28 RX ADMIN — REGADENOSON 0.4 MG: 0.08 INJECTION, SOLUTION INTRAVENOUS at 10:24

## 2020-01-28 RX ADMIN — TECHNETIUM TC 99M SESTAMIBI 1 DOSE: 1 INJECTION INTRAVENOUS at 08:50

## 2020-04-23 ENCOUNTER — TELEPHONE (OUTPATIENT)
Dept: BARIATRICS/WEIGHT MGMT | Facility: CLINIC | Age: 50
End: 2020-04-23

## 2020-04-23 NOTE — TELEPHONE ENCOUNTER
Pt called in stating that she is needing a different medication (rx) to replace Ranitidine, since they took it off the shelves. Pt states that she takes Omeprazole in the day and was taking the other at night but cant now due to it not being sold anymore.  Pt stated that she is having issue with Gerd and is miserable. Please advise, thank you. Also, I had her schedule a video visit with Mireya

## 2020-04-23 NOTE — TELEPHONE ENCOUNTER
Notified pt to increase her Omeprazole to twice daily dosing and we can discuss further at her OV. Pt verbalized understanding.

## 2020-04-27 ENCOUNTER — TELEMEDICINE (OUTPATIENT)
Dept: BARIATRICS/WEIGHT MGMT | Facility: CLINIC | Age: 50
End: 2020-04-27

## 2020-04-27 DIAGNOSIS — K21.9 GASTROESOPHAGEAL REFLUX DISEASE, ESOPHAGITIS PRESENCE NOT SPECIFIED: Primary | ICD-10-CM

## 2020-04-27 DIAGNOSIS — E55.9 HYPOVITAMINOSIS D: ICD-10-CM

## 2020-04-27 DIAGNOSIS — Z98.84 STATUS POST BARIATRIC SURGERY: ICD-10-CM

## 2020-04-27 PROCEDURE — 99214 OFFICE O/P EST MOD 30 MIN: CPT | Performed by: SURGERY

## 2020-04-27 RX ORDER — OMEPRAZOLE 40 MG/1
40 CAPSULE, DELAYED RELEASE ORAL DAILY
Qty: 180 CAPSULE | Refills: 3 | Status: ON HOLD | OUTPATIENT
Start: 2020-04-27 | End: 2020-09-02

## 2020-04-27 NOTE — PROGRESS NOTES
[facility] Bariatric Surgery  [address]  [address]  [facility] [address]  [phone]        Patient Name:  Shaun Morales.  :  1970      Date of Visit: 2020      Reason for Visit:   2 years postop      HPI: Shaun Morales is a 49 y.o. female s/p LSG/para HHR by Dr. Mckeon 18    Still has worsened GERD since sleeve.  She was taking omeprazole, then ranitidine at night.  Insurance would only pay for omeprazole once daily, even though it was prescribed for bid.  If she doesn't take the omeprazole bid she can't stand it.  Before surgery, her GERD was controlled by avoiding certain things in her diet.      Otherwise doing well.  Denies dysphagia, nausea, vomiting and abdominal pain.  Getting 100+g prot/day.  Drinking <64  fluid oz/day. Last labs revealed low vitamin D and she took the replacement.  On no other vitamins.  Feels somewhat fatigued.  On Omeprazole .  Exercise: walking 1 mile daily and yardwork.    Eats breakfast, lunch, dinner, and 3 snacks.  She cooks at her job, and thinks this causes her to snack.       Presurgery weight: 279 pounds.  Today's weight is 192 pounds, today's  There is no height or weight on file to calculate BMI., and@ weight loss since surgery is 87 pounds.      Past Medical History:   Diagnosis Date   • Anxiety    • Asthma     mild   • Back pain    • Depression    • Environmental allergies     pollen, pollution   • Family history of pseudocholinesterase deficiency     sister, dad had it.  Unsure if she has it, but reports has never had succinylcholine   • Fatigue    • GERD (gastroesophageal reflux disease)     rarely, associated with onions and red sauces, avoids both.  PRN Tums, EGD with Dr. Rich 2017, op report reviewed, no HH. urease neg. serum h. pyl neg   • H. pylori infection     symptoms of abodminal pain/dyspepsia, tx   • History of MRSA infection     UTI WITH REPORRTED 3-4 CLEAN CATCH WWITH NO mrSA    • Hyperlipidemia    •  "Hypertension    • IBS (irritable bowel syndrome)     constipation   • Interstitial cystitis     recent procedure to \"stretch\" the bladder, feels better; has pain in bladder as primary symptom, dyspareunia   • Mitral valve regurgitation     better now, has no symptoms, + hear murmur   • Mood disorder (CMS/[facility])     no formal dx of bipolar disorder, but after bad divorce took lithium   • Nausea     car sickness, has PRN Zofran, motion sickness, previously took meclizine   • Obstructive sleep apnea on CPAP     CPAP compliant setting 11   • Peripheral edema    • PONV (postoperative nausea and vomiting)    • Prediabetes    • Psoriasis    • Psoriatic arthritis (CMS/[facility])    • Trauma of chest      moving trailer, fell on her, several cracked ribs on left, feels it caused her umbo hernia recurrence.  had to be dug out.  Exhusband fx pelvis, mult surgeries.   • Vitamin D deficiency      Past Surgical History:   Procedure Laterality Date   • APPENDECTOMY     • CARDIAC CATHETERIZATION      done after discovery of MVP, also had a CHERISE, normal per patient   •  SECTION     • COLONOSCOPY     • CYSTOSCOPY BLADDER HYDRODISTENSION N/A 2017    Procedure: CYSTOSCOPY BLADDER HYDRODISTENSION;  Surgeon: Ion Herbert MD;  [facility]  [facility];  Service:    • DIAGNOSTIC LAPAROSCOPY N/A 10/14/2016    Procedure: DIAGNOSTIC LAPAROSCOPY POSSIBLE RIGHT SALPINGOOPHORECTOMY;  Surgeon: Rory Owens DO;  [facility]  [facility];  Service:    • ENDOSCOPY      Dr. Rich   • ENDOSCOPY N/A 2018    Procedure: ESOPHAGOGASTRODUODENOSCOPY;  Surgeon: Aneesh Mckeon MD;  [facility]:  [facility];  Service:    • FOOT SURGERY     • GASTRIC SLEEVE LAPAROSCOPIC N/A 2018    Procedure: GASTRIC SLEEVE LAPAROSCOPIC;  Surgeon: Aneesh Mckeon MD;  [facility]:  [facility];  Service:    • HYSTERECTOMY  ,     laparoscopy supra-cervical hysterectomy ,  cervix and 1 ovary removed.  Remaining ovary " "has a cyst. initially done for pelvic pain/fibroids   • LAPAROSCOPIC APPENDECTOMY  2015   • PARAESOPHAGEAL HERNIA REPAIR N/A 2/23/2018    Procedure: PARAESOPHAGEAL HERNIA REPAIR LAPAROSCOPIC;  Surgeon: Aneesh Mckeon MD;  [facility]:  [facility];  Service:    • SKIN BIOPSY     • STOMACH SURGERY      Gastric Sleeve    • TRACHELECTOMY N/A 10/14/2016    Procedure: TRACHELECTOMY VAGINAL;  Surgeon: Rory Owens DO;  [facility]  [facility];  Service:    • TUBAL ABDOMINAL LIGATION  2000    LEFT OVARY REMAINS   • UMBILICAL HERNIA REPAIR N/A 10/14/2016    Procedure: UMBILICAL HERNIA REPAIR;  Surgeon: Spike Porter MD;  [facility]  [facility];  Service:    • UMBILICAL HERNIA REPAIR N/A 12/9/2016    Procedure: UMBILICAL HERNIA REPAIR;  Surgeon: Spike Porter MD;  [facility]  [facility];  with mesh, supraumbilical   • WISDOM TOOTH EXTRACTION  2000     No outpatient medications have been marked as taking for the 4/27/20 encounter (Telemedicine) with Hillary Christianson MD.       Allergies   Allergen Reactions   • Amlodipine GI Intolerance and Arrhythmia   • Morphine And Related Itching     Dilaudid ok, percocet/lortab ok   • Nifedipine GI Intolerance and Arrhythmia     Adalat, procardia   • Nsaids Unknown (See Comments)     Pt states she cannot take NSAIDs for a peroid of time after having her Gastric Sleeve Surgery    • Other Other (See Comments)     Pt states she cannot taken Steroids for some time after Gastric sleeve operation    • Adhesive Tape Rash     Can tolerate steristrips and skin glue   • Chlorhexidine Rash   • Diazepam Anxiety     \"reverse effect,\" \"makes me absolutely crazy\"   • Midazolam Anxiety   • Sulfa Antibiotics Rash       Social History     Socioeconomic History   • Marital status:      Spouse name: Not on file   • Number of children: Not on file   • Years of education: Not on file   • Highest education level: Not on file   Tobacco Use   • Smoking status: Former Smoker     Packs/day: 0.50     " Years: 10.00     Pack years: 5.00     Types: Cigarettes     Last attempt to quit: 2013     Years since quittin.3   • Smokeless tobacco: Never Used   • Tobacco comment: Is around secondhand smoke occasionally in car but not in house.  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         LMP  (LMP Unknown) Comment: hysterectomy    Physical Exam   Constitutional: She is oriented to person, place, and time. She appears well-developed and well-nourished. No distress.   HENT:   Head: Normocephalic and atraumatic.   Mouth/Throat: No oropharyngeal exudate.   Eyes: Pupils are equal, round, and reactive to light. Conjunctivae and EOM are normal.   Pulmonary/Chest: Effort normal. No respiratory distress.   Neurological: She is alert and oriented to person, place, and time.   Skin: She is not diaphoretic. No pallor.   Psychiatric: She has a normal mood and affect. Her behavior is normal. Thought content normal.         Assessment:  2 years s/p LSG/para HHR by Dr. Mckeno 18    ICD-10-CM ICD-9-CM   1. Gastroesophageal reflux disease, esophagitis presence not specified K21.9 530.81   2. Status post bariatric surgery Z98.84 V45.86   3. Hypovitaminosis D E55.9 268.9         Plan:  Doing well. Continue w/ good food choices and healthy habits.  Continue protein >70g/day.  Continue routine exercise.  Routine bariatric labs to be ordered at next office visit.  Continue vitamins w/ adjustments pending lab results.  Call w/ problems/concerns.     The patient was instructed to follow up in 6 months, sooner if needed.    This visit was conducted as a video visit, in an effort to limit spread of the novel coronavirus during the  pandemic.      Will prescribe AGAIN: omeprazole 40 mg bid.      If the  medicine I prescribe is denied AGAIN, our options would be:  -switch to different PPI bid  -insurance pays for daily PPI and she gets PPI OTC for evening dose  -PPI in AM, famotidine in PM.    note: approx 15 of the 25 minute visit was spent counseling on nutrition and necessary dietary/lifestyle modifications face to face.    Hillary Christianson MD

## 2020-06-23 ENCOUNTER — HOSPITAL ENCOUNTER (OUTPATIENT)
Dept: GENERAL RADIOLOGY | Facility: HOSPITAL | Age: 50
Discharge: HOME | End: 2020-06-23

## 2020-06-23 ENCOUNTER — TRANSCRIBE ORDERS (OUTPATIENT)
Dept: ADMINISTRATIVE | Facility: HOSPITAL | Age: 50
End: 2020-06-23

## 2020-06-23 ENCOUNTER — HOSPITAL ENCOUNTER (OUTPATIENT)
Dept: GENERAL RADIOLOGY | Facility: HOSPITAL | Age: 50
Discharge: HOME | End: 2020-06-23
Admitting: NURSE PRACTITIONER

## 2020-06-23 DIAGNOSIS — M54.9 MID BACK PAIN: ICD-10-CM

## 2020-06-23 DIAGNOSIS — M25.511 RIGHT SHOULDER PAIN, UNSPECIFIED CHRONICITY: Primary | ICD-10-CM

## 2020-06-23 DIAGNOSIS — M25.552 ACUTE HIP PAIN, BILATERAL: ICD-10-CM

## 2020-06-23 DIAGNOSIS — M25.511 RIGHT SHOULDER PAIN, UNSPECIFIED CHRONICITY: ICD-10-CM

## 2020-06-23 DIAGNOSIS — M25.551 ACUTE HIP PAIN, BILATERAL: ICD-10-CM

## 2020-06-23 DIAGNOSIS — M54.50 LOW BACK PAIN, UNSPECIFIED BACK PAIN LATERALITY, UNSPECIFIED CHRONICITY, UNSPECIFIED WHETHER SCIATICA PRESENT: ICD-10-CM

## 2020-06-23 DIAGNOSIS — M54.2 NECK PAIN: ICD-10-CM

## 2020-06-23 PROCEDURE — 72052 X-RAY EXAM NECK SPINE 6/>VWS: CPT | Performed by: RADIOLOGY

## 2020-06-23 PROCEDURE — 73502 X-RAY EXAM HIP UNI 2-3 VIEWS: CPT | Performed by: RADIOLOGY

## 2020-06-23 PROCEDURE — 72110 X-RAY EXAM L-2 SPINE 4/>VWS: CPT | Performed by: RADIOLOGY

## 2020-06-23 PROCEDURE — 72050 X-RAY EXAM NECK SPINE 4/5VWS: CPT

## 2020-06-23 PROCEDURE — 73030 X-RAY EXAM OF SHOULDER: CPT | Performed by: RADIOLOGY

## 2020-06-23 PROCEDURE — 73030 X-RAY EXAM OF SHOULDER: CPT

## 2020-06-23 PROCEDURE — 72110 X-RAY EXAM L-2 SPINE 4/>VWS: CPT

## 2020-06-23 PROCEDURE — 73522 X-RAY EXAM HIPS BI 3-4 VIEWS: CPT

## 2020-06-23 PROCEDURE — 72072 X-RAY EXAM THORAC SPINE 3VWS: CPT | Performed by: RADIOLOGY

## 2020-06-23 PROCEDURE — 72072 X-RAY EXAM THORAC SPINE 3VWS: CPT

## 2020-07-27 ENCOUNTER — OFFICE VISIT (OUTPATIENT)
Dept: SURGERY | Facility: CLINIC | Age: 50
End: 2020-07-27

## 2020-07-27 VITALS
SYSTOLIC BLOOD PRESSURE: 169 MMHG | HEART RATE: 84 BPM | BODY MASS INDEX: 37.32 KG/M2 | DIASTOLIC BLOOD PRESSURE: 94 MMHG | WEIGHT: 202.8 LBS | HEIGHT: 62 IN

## 2020-07-27 DIAGNOSIS — E65 ABDOMINAL PANNUS: Primary | ICD-10-CM

## 2020-07-27 PROCEDURE — 99213 OFFICE O/P EST LOW 20 MIN: CPT | Performed by: SURGERY

## 2020-07-27 RX ORDER — LISINOPRIL 20 MG/1
20 TABLET ORAL DAILY
COMMUNITY
Start: 2020-06-15

## 2020-07-27 RX ORDER — MONTELUKAST SODIUM 10 MG/1
TABLET ORAL
COMMUNITY
Start: 2020-06-15 | End: 2020-08-31

## 2020-07-27 NOTE — PROGRESS NOTES
"Subjective   Shaun Morales is a 50 y.o. female is being seen for consultation today for excess skin self referral.    History of Present Illness  Ms. Morales was seen in the office today to discuss excision of excess skin.  The patient had the gastric sleeve procedure in February 2018.  She lost between 110 and 120 pounds.  Because of the overhanging skin the patient has to use topical creams and powders to treat skin infections.  She primarily uses nystatin powder and diaper cream.  The patient states that she actually showers at least twice a day to try to keep the moisture out of her pannus.  The patient's weight has been stable for the last 6 to 8 months.  She is not at her optimal goal weight but given that she has plateaued where she is she feels that she is unlikely to lose a significant amount of additional weight and she is comfortable where she is.    Allergies   Allergen Reactions   • Amlodipine GI Intolerance and Arrhythmia   • Morphine And Related Itching     Dilaudid ok, percocet/lortab ok   • Nifedipine GI Intolerance and Arrhythmia     Adalat, procardia   • Nsaids Unknown (See Comments)     Pt states she cannot take NSAIDs for a peroid of time after having her Gastric Sleeve Surgery    • Other Other (See Comments)     Pt states she cannot taken Steroids for some time after Gastric sleeve operation    • Adhesive Tape Rash     Can tolerate steristrips and skin glue   • Chlorhexidine Rash   • Diazepam Anxiety     \"reverse effect,\" \"makes me absolutely crazy\"   • Midazolam Anxiety   • Sulfa Antibiotics Rash     Current Outpatient Medications   Medication Sig Dispense Refill   • albuterol sulfate  (90 Base) MCG/ACT inhaler Inhale 2 puffs Every 4 (Four) Hours As Needed for Wheezing or Shortness of Air. 1 inhaler 0   • lisinopril (PRINIVIL,ZESTRIL) 20 MG tablet      • montelukast (SINGULAIR) 10 MG tablet      • vitamin D (ERGOCALCIFEROL) 32866 units capsule capsule Take 50,000 Units by mouth 1 (One) " "Time Per Week. Prior to [facility], Patient was on: takes on wednesdays     • omeprazole (PrilOSEC) 40 MG capsule Take 1 capsule by mouth Daily for 90 days. 180 capsule 3     No current facility-administered medications for this visit.      Past Medical History:   Diagnosis Date   • Anxiety    • Asthma     mild   • Back pain    • Depression    • Environmental allergies     pollen, pollution   • Family history of pseudocholinesterase deficiency     sister, dad had it.  Unsure if she has it, but reports has never had succinylcholine   • Fatigue    • GERD (gastroesophageal reflux disease)     rarely, associated with onions and red sauces, avoids both.  PRN Tums, EGD with Dr. Rich 2017, op report reviewed, no HH. urease neg. serum h. pyl neg   • H. pylori infection     symptoms of abodminal pain/dyspepsia, tx   • History of MRSA infection 2012    UTI WITH REPORRTED 3-4 CLEAN CATCH WWITH NO mrSA    • Hyperlipidemia    • Hypertension    • IBS (irritable bowel syndrome)     constipation   • Interstitial cystitis     recent procedure to \"stretch\" the bladder, feels better; has pain in bladder as primary symptom, dyspareunia   • Mitral valve regurgitation     better now, has no symptoms, + hear murmur   • Mood disorder (CMS/[facility])     no formal dx of bipolar disorder, but after bad divorce took lithium   • Nausea     car sickness, has PRN Zofran, motion sickness, previously took meclizine   • Obstructive sleep apnea on CPAP     CPAP compliant setting 11   • Peripheral edema    • PONV (postoperative nausea and vomiting)    • Prediabetes    • Psoriasis    • Psoriatic arthritis (CMS/HCC)    • Trauma of chest     2014 moving trailer, fell on her, several cracked ribs on left, feels it caused her umbo hernia recurrence.  had to be dug out.  Exhusband fx pelvis, mult surgeries.   • Vitamin D deficiency      Past Surgical History:   Procedure Laterality Date   • APPENDECTOMY     • CARDIAC CATHETERIZATION  2007    done after " discovery of MVP, also had a CHERISE, normal per patient   •  SECTION     • COLONOSCOPY     • CYSTOSCOPY BLADDER HYDRODISTENSION N/A 2017    Procedure: CYSTOSCOPY BLADDER HYDRODISTENSION;  Surgeon: Ion Herbert MD;  [facility];  Service:    • DIAGNOSTIC LAPAROSCOPY N/A 10/14/2016    Procedure: DIAGNOSTIC LAPAROSCOPY POSSIBLE RIGHT SALPINGOOPHORECTOMY;  Surgeon: Rory Owens DO;  [facility];  Service:    • ENDOSCOPY      Dr. Rich   • ENDOSCOPY N/A 2018    Procedure: ESOPHAGOGASTRODUODENOSCOPY;  Surgeon: Aneesh Mckeon MD;  [facility]:  [facility];  Service:    • FOOT SURGERY     • GASTRIC SLEEVE LAPAROSCOPIC N/A 2018    Procedure: GASTRIC SLEEVE LAPAROSCOPIC;  Surgeon: Aneesh Mckeon MD;  [facility]:  [facility];  Service:    • HYSTERECTOMY  ,     laparoscopy supra-cervical hysterectomy 2016 cervix and 1 ovary removed.  Remaining ovary has a cyst. initially done for pelvic pain/fibroids   • LAPAROSCOPIC APPENDECTOMY     • PARAESOPHAGEAL HERNIA REPAIR N/A 2018    Procedure: PARAESOPHAGEAL HERNIA REPAIR LAPAROSCOPIC;  Surgeon: Aneesh Mckeon MD;  [facility]:  [facility];  Service:    • SKIN BIOPSY     • STOMACH SURGERY      Gastric Sleeve    • TRACHELECTOMY N/A 10/14/2016    Procedure: TRACHELECTOMY VAGINAL;  Surgeon: Rory Owens DO;  [facility];  Service:    • TUBAL ABDOMINAL LIGATION      LEFT OVARY REMAINS   • UMBILICAL HERNIA REPAIR N/A 10/14/2016    Procedure: UMBILICAL HERNIA REPAIR;  Surgeon: Spike Porter MD;  [facility];  Service:    • UMBILICAL HERNIA REPAIR N/A 2016    Procedure: UMBILICAL HERNIA REPAIR;  Surgeon: Spike Porter MD;  [facility]  [facility];  with mesh, supraumbilical   • WISDOM TOOTH EXTRACTION       Review of Systems  General: 112 total loss  Integumentary: rash  Eyes: negative  ENT: negative  Respiratory: shortness of breath  Gastrointestinal:  "heartburn and constipation  Cardiovascular: negative  Neurological: numbness  Psychiatric: insomnia  Hematologic/Lymphatic: negative  Genitourinary: negative  Musculoskeletal: painful joints, joint swelling, back pain and joint stiffness  Endocrine: negative  Breasts: negative        Objective   /94 (BP Location: Left arm)   Pulse 84   Ht 157.5 cm (62\")   Wt 92 kg (202 lb 12.8 oz)   LMP  (LMP Unknown) Comment: hysterectomy  BMI 37.09 kg/m²   Physical Exam  General:  This is a WD WN female in no acute distress  HEENT exam:  WNL. Sclera are anicteric.  EOMI  Neck:  supple, FROM, without thyromegaly, cervical or supraclavicular adenopathy  Lungs:  Respiratory effort normal. Auscultation: Clear, without wheezes, rhonchi, rales  Heart:  Regular rate and rhythm, without murmur, gallop, rub.  No pedal edema  Abdomen: Bowel sounds present.  No palpable mass.  No evidence of hernia  Musculoskeletal:  muscle strength/tone is normal.  Gait and station: normal. No digital cyanosis  Psyc:  alert, oriented x 3.  Mood and affect are appropriate  skin:  Warm with good turgor.  Without rash or lesion.  Large abdominal pannus  extremities:  Examination of the extremities revealed no cyanosis, clubbing or edema.  Results/Data    Procedures       Assessment/Plan   Symptomatic abdominal pannus    Patient would be a good candidate for panniculectomy       Discussion/Summary    Patient's Body mass index is 37.09 kg/m². BMI is above normal parameters. Recommendations include: educational material.       Future Appointments   Date Time Provider Department Center   10/26/2020 11:00 AM Bety Baker PA [facility] None         Please note that portions of this note were completed with a voice recognition program.  "

## 2020-07-28 PROBLEM — E65 ABDOMINAL PANNUS: Status: ACTIVE | Noted: 2020-07-28

## 2020-08-28 RX ORDER — CEFAZOLIN SODIUM 2 G/50ML
2 SOLUTION INTRAVENOUS ONCE
Status: CANCELLED | OUTPATIENT
Start: 2020-09-02 | End: 2020-08-28

## 2020-08-31 ENCOUNTER — APPOINTMENT (OUTPATIENT)
Dept: PREADMISSION TESTING | Facility: HOSPITAL | Age: 50
End: 2020-08-31

## 2020-08-31 ENCOUNTER — OFFICE VISIT (OUTPATIENT)
Dept: SURGERY | Facility: CLINIC | Age: 50
End: 2020-08-31

## 2020-08-31 ENCOUNTER — LAB (OUTPATIENT)
Dept: LAB | Facility: HOSPITAL | Age: 50
End: 2020-08-31

## 2020-08-31 VITALS
BODY MASS INDEX: 36.51 KG/M2 | HEIGHT: 62 IN | DIASTOLIC BLOOD PRESSURE: 82 MMHG | WEIGHT: 198.4 LBS | SYSTOLIC BLOOD PRESSURE: 145 MMHG | HEART RATE: 62 BPM

## 2020-08-31 DIAGNOSIS — Z01.818 PREOP TESTING: ICD-10-CM

## 2020-08-31 DIAGNOSIS — Z01.818 PREOP TESTING: Primary | ICD-10-CM

## 2020-08-31 DIAGNOSIS — E65 ABDOMINAL PANNUS: ICD-10-CM

## 2020-08-31 DIAGNOSIS — E65 ABDOMINAL PANNUS: Primary | ICD-10-CM

## 2020-08-31 LAB
ANION GAP SERPL CALCULATED.3IONS-SCNC: 10.1 MMOL/L (ref 5–15)
BUN SERPL-MCNC: 15 MG/DL (ref 6–20)
BUN/CREAT SERPL: 28.3 (ref 7–25)
CALCIUM SPEC-SCNC: 8.9 MG/DL (ref 8.6–10.5)
CHLORIDE SERPL-SCNC: 105 MMOL/L (ref 98–107)
CO2 SERPL-SCNC: 23.9 MMOL/L (ref 22–29)
CREAT SERPL-MCNC: 0.53 MG/DL (ref 0.57–1)
DEPRECATED RDW RBC AUTO: 43.6 FL (ref 37–54)
ERYTHROCYTE [DISTWIDTH] IN BLOOD BY AUTOMATED COUNT: 12.6 % (ref 12.3–15.4)
GFR SERPL CREATININE-BSD FRML MDRD: 123 ML/MIN/1.73
GLUCOSE SERPL-MCNC: 97 MG/DL (ref 65–99)
HCT VFR BLD AUTO: 41.8 % (ref 34–46.6)
HGB BLD-MCNC: 13.6 G/DL (ref 12–15.9)
MCH RBC QN AUTO: 30.4 PG (ref 26.6–33)
MCHC RBC AUTO-ENTMCNC: 32.5 G/DL (ref 31.5–35.7)
MCV RBC AUTO: 93.5 FL (ref 79–97)
PLATELET # BLD AUTO: 302 10*3/MM3 (ref 140–450)
PMV BLD AUTO: 12 FL (ref 6–12)
POTASSIUM SERPL-SCNC: 3.9 MMOL/L (ref 3.5–5.2)
RBC # BLD AUTO: 4.47 10*6/MM3 (ref 3.77–5.28)
SODIUM SERPL-SCNC: 139 MMOL/L (ref 136–145)
WBC # BLD AUTO: 5.98 10*3/MM3 (ref 3.4–10.8)

## 2020-08-31 PROCEDURE — 36415 COLL VENOUS BLD VENIPUNCTURE: CPT

## 2020-08-31 PROCEDURE — S0260 H&P FOR SURGERY: HCPCS | Performed by: SURGERY

## 2020-08-31 PROCEDURE — C9803 HOPD COVID-19 SPEC COLLECT: HCPCS | Performed by: SURGERY

## 2020-08-31 PROCEDURE — 85027 COMPLETE CBC AUTOMATED: CPT | Performed by: SURGERY

## 2020-08-31 PROCEDURE — 80048 BASIC METABOLIC PNL TOTAL CA: CPT | Performed by: SURGERY

## 2020-08-31 PROCEDURE — U0004 COV-19 TEST NON-CDC HGH THRU: HCPCS

## 2020-08-31 PROCEDURE — U0002 COVID-19 LAB TEST NON-CDC: HCPCS

## 2020-08-31 NOTE — PROGRESS NOTES
"Subjective   Shaun Morales is a 50 y.o. female is here today for follow-up for H and P.    History of Present Illness  Ms. Morales was seen in the office today for her preoperative visit prior to her panniculectomy.  She was initially seen on 7/27/2020.The patient had the gastric sleeve procedure in February 2018.  She lost between 110 and 120 pounds.  Because of the overhanging skin the patient has to use topical creams and powders to treat skin infections.  She primarily uses nystatin powder and diaper cream.  The patient states that she actually showers at least twice a day to try to keep the moisture out of her pannus.  The patient's weight has been stable for the last 6 to 8 months.  She is not at her optimal goal weight but given that she has plateaued where she is she feels that she is unlikely to lose a significant amount of additional weight and she is comfortable where she is.  There have been no significant changes in the patient's medical history since her visit 1 month ago.    Allergies   Allergen Reactions   • Amlodipine GI Intolerance and Arrhythmia   • Morphine And Related Itching     Dilaudid ok, percocet/lortab ok   • Nifedipine GI Intolerance and Arrhythmia     Adalat, procardia   • Nsaids Unknown (See Comments)     Pt states she cannot take NSAIDs for a peroid of time after having her Gastric Sleeve Surgery    • Other Other (See Comments)     Pt states she cannot taken Steroids for some time after Gastric sleeve operation    • Adhesive Tape Rash     Can tolerate steristrips and skin glue   • Chlorhexidine Rash   • Diazepam Anxiety     \"reverse effect,\" \"makes me absolutely crazy\"   • Midazolam Anxiety   • Sulfa Antibiotics Rash         Current Outpatient Medications   Medication Sig Dispense Refill   • albuterol sulfate  (90 Base) MCG/ACT inhaler Inhale 2 puffs Every 4 (Four) Hours As Needed for Wheezing or Shortness of Air. 1 inhaler 0   • lisinopril (PRINIVIL,ZESTRIL) 20 MG tablet      • " "montelukast (SINGULAIR) 10 MG tablet      • vitamin D (ERGOCALCIFEROL) 69880 units capsule capsule Take 50,000 Units by mouth 1 (One) Time Per Week. [facility], Patient was on: takes on wednesdays     • omeprazole (PrilOSEC) 40 MG capsule Take 1 capsule by mouth Daily for 90 days. 180 capsule 3     No current facility-administered medications for this visit.      Past Medical History:   Diagnosis Date   • Anxiety    • Asthma     mild   • Back pain    • Depression    • Environmental allergies     pollen, pollution   • Family history of pseudocholinesterase deficiency     sister, dad had it.  Unsure if she has it, but reports has never had succinylcholine   • Fatigue    • GERD (gastroesophageal reflux disease)     rarely, associated with onions and red sauces, avoids both.  PRN Tums, EGD with Dr. Rich 2017, op report reviewed, no HH. urease neg. serum h. pyl neg   • H. pylori infection     symptoms of abodminal pain/dyspepsia, tx   • History of MRSA infection 2012    UTI WITH REPORRTED 3-4 CLEAN CATCH WWITH NO mrSA    • Hyperlipidemia    • Hypertension    • IBS (irritable bowel syndrome)     constipation   • Interstitial cystitis     recent procedure to \"stretch\" the bladder, feels better; has pain in bladder as primary symptom, dyspareunia   • Mitral valve regurgitation     better now, has no symptoms, + hear murmur   • Mood disorder (CMS/[facility])     no formal dx of bipolar disorder, but after bad divorce took lithium   • Nausea     car sickness, has PRN Zofran, motion sickness, previously took meclizine   • Obstructive sleep apnea on CPAP     CPAP compliant setting 11   • Peripheral edema    • PONV (postoperative nausea and vomiting)    • Prediabetes    • Psoriasis    • Psoriatic arthritis (CMS/HCC)    • Trauma of chest     2014 moving trailer, fell on her, several cracked ribs on left, feels it caused her umbo hernia recurrence.  had to be dug out.  Exhusband fx pelvis, mult surgeries.   • Vitamin D " deficiency      Past Surgical History:   Procedure Laterality Date   • APPENDECTOMY     • CARDIAC CATHETERIZATION      done after discovery of MVP, also had a CHERISE, normal per patient   •  SECTION     • COLONOSCOPY     • CYSTOSCOPY BLADDER HYDRODISTENSION N/A 2017    Procedure: CYSTOSCOPY BLADDER HYDRODISTENSION;  Surgeon: Ion Herbert MD;  [facility];  Service:    • DIAGNOSTIC LAPAROSCOPY N/A 10/14/2016    Procedure: DIAGNOSTIC LAPAROSCOPY POSSIBLE RIGHT SALPINGOOPHORECTOMY;  Surgeon: Rory Owens DO;  [facility];  Service:    • ENDOSCOPY      Dr. Rich   • ENDOSCOPY N/A 2018    Procedure: ESOPHAGOGASTRODUODENOSCOPY;  Surgeon: Aneesh Mckeon MD;  [facility]:  [facility];  Service:    • FOOT SURGERY     • GASTRIC SLEEVE LAPAROSCOPIC N/A 2018    Procedure: GASTRIC SLEEVE LAPAROSCOPIC;  Surgeon: Aneesh Mckeon MD;  [facility]:  [facility];  Service:    • HYSTERECTOMY  ,     laparoscopy supra-cervical hysterectomy ,  cervix and 1 ovary removed.  Remaining ovary has a cyst. initially done for pelvic pain/fibroids   • LAPAROSCOPIC APPENDECTOMY     • PARAESOPHAGEAL HERNIA REPAIR N/A 2018    Procedure: PARAESOPHAGEAL HERNIA REPAIR LAPAROSCOPIC;  Surgeon: Aneesh Mckeon MD;  [facility]:  [facility];  Service:    • SKIN BIOPSY     • STOMACH SURGERY      Gastric Sleeve    • TRACHELECTOMY N/A 10/14/2016    Procedure: TRACHELECTOMY VAGINAL;  Surgeon: Rory Owens DO;  [facility];  Service:    • TUBAL ABDOMINAL LIGATION      LEFT OVARY REMAINS   • UMBILICAL HERNIA REPAIR N/A 10/14/2016    Procedure: UMBILICAL HERNIA REPAIR;  Surgeon: Spike Porter MD;  [facility];  Service:    • UMBILICAL HERNIA REPAIR N/A 2016    Procedure: UMBILICAL HERNIA REPAIR;  Surgeon: Spike Porter MD;  [facility]  [facility];  with mesh, supraumbilical   • WISDOM TOOTH EXTRACTION         The following  "portions of the patient's history were reviewed and updated as appropriate: allergies, current medications, past family history, past medical history, past social history, past surgical history and problem list.    Review of Systems  General: negative  Integumentary: rash  Eyes: negative  ENT: negative  Respiratory: negative  Gastrointestinal: negative  Cardiovascular: negative  Neurological: negative  Psychiatric: negative  Hematologic/Lymphatic: negative  Genitourinary: negative  Musculoskeletal: painful joints  Endocrine: negative  Breasts: negative    Objective   Ht 157.5 cm (62\")   Wt 90 kg (198 lb 6.4 oz)   LMP  (LMP Unknown) Comment: hysterectomy  BMI 36.29 kg/m²    Physical Exam  General:  This is a WD WN female in no acute distress  HEENT exam:  WNL. Sclera are anicteric.  EOMI  Neck:  supple, FROM, without thyromegaly, cervical or supraclavicular adenopathy  Lungs:  Respiratory effort normal. Auscultation: Clear, without wheezes, rhonchi, rales  Heart:  Regular rate and rhythm, without murmur, gallop, rub.  No pedal edema  Abdomen: Bowel sounds present.  No palpable mass.  No evidence of hernia  Musculoskeletal:  muscle strength/tone is normal.  Gait and station: normal. No digital cyanosis  Psyc:  alert, oriented x 3.  Mood and affect are appropriate  skin:  Warm with good turgor.  Without rash or lesion.  Large abdominal pannus  extremities:  Examination of the extremities revealed no cyanosis, clubbing or edema.    Results/Data      Procedures     Assessment/Plan   Symptomatic abdominal pannus    Plan: Proceed with panniculectomy       Discussion/Summary: The risks of the surgical procedure were discussed.  Options of alternative treatments including no treatment (if applicable) were discussed.  Patient voiced understanding of the above issues and wishes to proceed    Patient's Body mass index is 36.29 kg/m². BMI is above normal parameters. Recommendations include: educational material.   "       Future Appointments   Date Time Provider Department Center   8/31/2020 12:00 PM PAT 1 [facility]   10/26/2020 11:00 AM Bety Baker PA [facility] None         Please note that portions of this note were completed with a voice recognition program.

## 2020-09-01 ENCOUNTER — TELEPHONE (OUTPATIENT)
Dept: SURGERY | Facility: CLINIC | Age: 50
End: 2020-09-01

## 2020-09-01 LAB
REF LAB TEST METHOD: NORMAL
SARS-COV-2 RNA RESP QL NAA+PROBE: NOT DETECTED

## 2020-09-02 ENCOUNTER — ANESTHESIA EVENT (OUTPATIENT)
Dept: PERIOP | Facility: HOSPITAL | Age: 50
End: 2020-09-02

## 2020-09-02 ENCOUNTER — HOSPITAL ENCOUNTER (OUTPATIENT)
Facility: HOSPITAL | Age: 50
Discharge: HOME | End: 2020-09-03
Attending: SURGERY | Admitting: SURGERY

## 2020-09-02 ENCOUNTER — ANESTHESIA (OUTPATIENT)
Dept: PERIOP | Facility: HOSPITAL | Age: 50
End: 2020-09-02

## 2020-09-02 DIAGNOSIS — E65 ABDOMINAL PANNUS: ICD-10-CM

## 2020-09-02 PROCEDURE — 25010000002 KETOROLAC TROMETHAMINE PER 15 MG: Performed by: SURGERY

## 2020-09-02 PROCEDURE — 25010000003 CEFAZOLIN SODIUM-DEXTROSE 2-3 GM-%(50ML) RECONSTITUTED SOLUTION: Performed by: SURGERY

## 2020-09-02 PROCEDURE — 15830 EXC EXCESSIVE SKIN ABDOMEN: CPT | Performed by: SURGERY

## 2020-09-02 PROCEDURE — 25010000002 PROPOFOL 10 MG/ML EMULSION: Performed by: NURSE ANESTHETIST, CERTIFIED REGISTERED

## 2020-09-02 PROCEDURE — G0378 HOSPITAL OBSERVATION PER HR: HCPCS

## 2020-09-02 PROCEDURE — 25010000002 FENTANYL CITRATE (PF) 100 MCG/2ML SOLUTION: Performed by: NURSE ANESTHETIST, CERTIFIED REGISTERED

## 2020-09-02 PROCEDURE — 25010000002 DEXAMETHASONE PER 1 MG: Performed by: NURSE ANESTHETIST, CERTIFIED REGISTERED

## 2020-09-02 PROCEDURE — 25010000002 ONDANSETRON PER 1 MG: Performed by: NURSE ANESTHETIST, CERTIFIED REGISTERED

## 2020-09-02 DEVICE — LIGACLIP EXTRA LIGATING CLIP CARTRIDGES: 6 TITANIUM CLIPS/ CARTRIDGE (MEDIUM)
Type: IMPLANTABLE DEVICE | Site: ABDOMEN | Status: FUNCTIONAL
Brand: LIGACLIP

## 2020-09-02 RX ORDER — MEPERIDINE HYDROCHLORIDE 25 MG/ML
12.5 INJECTION INTRAMUSCULAR; INTRAVENOUS; SUBCUTANEOUS
Status: DISCONTINUED | OUTPATIENT
Start: 2020-09-02 | End: 2020-09-02 | Stop reason: HOSPADM

## 2020-09-02 RX ORDER — FAMOTIDINE 10 MG/ML
INJECTION, SOLUTION INTRAVENOUS AS NEEDED
Status: DISCONTINUED | OUTPATIENT
Start: 2020-09-02 | End: 2020-09-02 | Stop reason: SURG

## 2020-09-02 RX ORDER — ALBUTEROL SULFATE 90 UG/1
2 AEROSOL, METERED RESPIRATORY (INHALATION) EVERY 4 HOURS PRN
Status: DISCONTINUED | OUTPATIENT
Start: 2020-09-02 | End: 2020-09-03 | Stop reason: HOSPADM

## 2020-09-02 RX ORDER — SODIUM CHLORIDE, SODIUM LACTATE, POTASSIUM CHLORIDE, CALCIUM CHLORIDE 600; 310; 30; 20 MG/100ML; MG/100ML; MG/100ML; MG/100ML
125 INJECTION, SOLUTION INTRAVENOUS CONTINUOUS
Status: DISCONTINUED | OUTPATIENT
Start: 2020-09-02 | End: 2020-09-03 | Stop reason: HOSPADM

## 2020-09-02 RX ORDER — SODIUM CHLORIDE 0.9 % (FLUSH) 0.9 %
10 SYRINGE (ML) INJECTION AS NEEDED
Status: DISCONTINUED | OUTPATIENT
Start: 2020-09-02 | End: 2020-09-03 | Stop reason: HOSPADM

## 2020-09-02 RX ORDER — LIDOCAINE HYDROCHLORIDE 20 MG/ML
INJECTION, SOLUTION EPIDURAL; INFILTRATION; INTRACAUDAL; PERINEURAL AS NEEDED
Status: DISCONTINUED | OUTPATIENT
Start: 2020-09-02 | End: 2020-09-02 | Stop reason: SURG

## 2020-09-02 RX ORDER — MAGNESIUM HYDROXIDE 1200 MG/15ML
LIQUID ORAL AS NEEDED
Status: DISCONTINUED | OUTPATIENT
Start: 2020-09-02 | End: 2020-09-02 | Stop reason: HOSPADM

## 2020-09-02 RX ORDER — FENTANYL CITRATE 50 UG/ML
50 INJECTION, SOLUTION INTRAMUSCULAR; INTRAVENOUS
Status: DISCONTINUED | OUTPATIENT
Start: 2020-09-02 | End: 2020-09-02 | Stop reason: HOSPADM

## 2020-09-02 RX ORDER — ACETAMINOPHEN 500 MG
1000 TABLET ORAL EVERY 6 HOURS
Status: DISCONTINUED | OUTPATIENT
Start: 2020-09-02 | End: 2020-09-03 | Stop reason: HOSPADM

## 2020-09-02 RX ORDER — POLYETHYLENE GLYCOL 3350 17 G/17G
17 POWDER, FOR SOLUTION ORAL 2 TIMES DAILY
Status: DISCONTINUED | OUTPATIENT
Start: 2020-09-02 | End: 2020-09-03 | Stop reason: HOSPADM

## 2020-09-02 RX ORDER — ONDANSETRON 2 MG/ML
4 INJECTION INTRAMUSCULAR; INTRAVENOUS EVERY 4 HOURS PRN
Status: DISCONTINUED | OUTPATIENT
Start: 2020-09-02 | End: 2020-09-03 | Stop reason: HOSPADM

## 2020-09-02 RX ORDER — VECURONIUM BROMIDE 1 MG/ML
INJECTION, POWDER, LYOPHILIZED, FOR SOLUTION INTRAVENOUS AS NEEDED
Status: DISCONTINUED | OUTPATIENT
Start: 2020-09-02 | End: 2020-09-02 | Stop reason: SURG

## 2020-09-02 RX ORDER — OXYCODONE HYDROCHLORIDE 5 MG/1
10 TABLET ORAL EVERY 4 HOURS PRN
Status: DISCONTINUED | OUTPATIENT
Start: 2020-09-02 | End: 2020-09-03 | Stop reason: HOSPADM

## 2020-09-02 RX ORDER — DEXAMETHASONE SODIUM PHOSPHATE 10 MG/ML
INJECTION INTRAMUSCULAR; INTRAVENOUS AS NEEDED
Status: DISCONTINUED | OUTPATIENT
Start: 2020-09-02 | End: 2020-09-02 | Stop reason: SURG

## 2020-09-02 RX ORDER — ONDANSETRON 2 MG/ML
INJECTION INTRAMUSCULAR; INTRAVENOUS AS NEEDED
Status: DISCONTINUED | OUTPATIENT
Start: 2020-09-02 | End: 2020-09-02 | Stop reason: SURG

## 2020-09-02 RX ORDER — KETOROLAC TROMETHAMINE 30 MG/ML
15 INJECTION, SOLUTION INTRAMUSCULAR; INTRAVENOUS EVERY 6 HOURS
Status: DISCONTINUED | OUTPATIENT
Start: 2020-09-02 | End: 2020-09-03 | Stop reason: HOSPADM

## 2020-09-02 RX ORDER — MIDAZOLAM HYDROCHLORIDE 1 MG/ML
1 INJECTION INTRAMUSCULAR; INTRAVENOUS
Status: DISCONTINUED | OUTPATIENT
Start: 2020-09-02 | End: 2020-09-02 | Stop reason: HOSPADM

## 2020-09-02 RX ORDER — IPRATROPIUM BROMIDE AND ALBUTEROL SULFATE 2.5; .5 MG/3ML; MG/3ML
3 SOLUTION RESPIRATORY (INHALATION) ONCE AS NEEDED
Status: DISCONTINUED | OUTPATIENT
Start: 2020-09-02 | End: 2020-09-02 | Stop reason: HOSPADM

## 2020-09-02 RX ORDER — SODIUM CHLORIDE 0.9 % (FLUSH) 0.9 %
10 SYRINGE (ML) INJECTION AS NEEDED
Status: DISCONTINUED | OUTPATIENT
Start: 2020-09-02 | End: 2020-09-02 | Stop reason: HOSPADM

## 2020-09-02 RX ORDER — PANTOPRAZOLE SODIUM 40 MG/1
40 TABLET, DELAYED RELEASE ORAL 2 TIMES DAILY
Status: DISCONTINUED | OUTPATIENT
Start: 2020-09-02 | End: 2020-09-03 | Stop reason: HOSPADM

## 2020-09-02 RX ORDER — SODIUM CHLORIDE, SODIUM LACTATE, POTASSIUM CHLORIDE, CALCIUM CHLORIDE 600; 310; 30; 20 MG/100ML; MG/100ML; MG/100ML; MG/100ML
100 INJECTION, SOLUTION INTRAVENOUS CONTINUOUS
Status: DISCONTINUED | OUTPATIENT
Start: 2020-09-02 | End: 2020-09-03 | Stop reason: HOSPADM

## 2020-09-02 RX ORDER — SCOLOPAMINE TRANSDERMAL SYSTEM 1 MG/1
1 PATCH, EXTENDED RELEASE TRANSDERMAL CONTINUOUS
Status: DISCONTINUED | OUTPATIENT
Start: 2020-09-02 | End: 2020-09-03

## 2020-09-02 RX ORDER — LISINOPRIL 10 MG/1
20 TABLET ORAL DAILY
Status: DISCONTINUED | OUTPATIENT
Start: 2020-09-03 | End: 2020-09-03 | Stop reason: HOSPADM

## 2020-09-02 RX ORDER — HYDROMORPHONE HYDROCHLORIDE 1 MG/ML
0.5 INJECTION, SOLUTION INTRAMUSCULAR; INTRAVENOUS; SUBCUTANEOUS
Status: DISCONTINUED | OUTPATIENT
Start: 2020-09-02 | End: 2020-09-03 | Stop reason: HOSPADM

## 2020-09-02 RX ORDER — CEFAZOLIN SODIUM 2 G/50ML
2 SOLUTION INTRAVENOUS ONCE
Status: COMPLETED | OUTPATIENT
Start: 2020-09-02 | End: 2020-09-02

## 2020-09-02 RX ORDER — ONDANSETRON 2 MG/ML
4 INJECTION INTRAMUSCULAR; INTRAVENOUS AS NEEDED
Status: DISCONTINUED | OUTPATIENT
Start: 2020-09-02 | End: 2020-09-02 | Stop reason: HOSPADM

## 2020-09-02 RX ORDER — CEFAZOLIN SODIUM 2 G/50ML
2 SOLUTION INTRAVENOUS EVERY 8 HOURS
Status: COMPLETED | OUTPATIENT
Start: 2020-09-03 | End: 2020-09-03

## 2020-09-02 RX ORDER — SODIUM CHLORIDE 0.9 % (FLUSH) 0.9 %
10 SYRINGE (ML) INJECTION EVERY 12 HOURS SCHEDULED
Status: DISCONTINUED | OUTPATIENT
Start: 2020-09-02 | End: 2020-09-02 | Stop reason: HOSPADM

## 2020-09-02 RX ORDER — DOCUSATE SODIUM 100 MG/1
100 CAPSULE, LIQUID FILLED ORAL 2 TIMES DAILY
Status: DISCONTINUED | OUTPATIENT
Start: 2020-09-02 | End: 2020-09-03 | Stop reason: HOSPADM

## 2020-09-02 RX ORDER — OMEPRAZOLE 40 MG/1
40 CAPSULE, DELAYED RELEASE ORAL 2 TIMES DAILY
COMMUNITY

## 2020-09-02 RX ORDER — PROPOFOL 10 MG/ML
VIAL (ML) INTRAVENOUS AS NEEDED
Status: DISCONTINUED | OUTPATIENT
Start: 2020-09-02 | End: 2020-09-02 | Stop reason: SURG

## 2020-09-02 RX ORDER — FENTANYL CITRATE 50 UG/ML
INJECTION, SOLUTION INTRAMUSCULAR; INTRAVENOUS AS NEEDED
Status: DISCONTINUED | OUTPATIENT
Start: 2020-09-02 | End: 2020-09-02 | Stop reason: SURG

## 2020-09-02 RX ORDER — SODIUM CHLORIDE 0.9 % (FLUSH) 0.9 %
3 SYRINGE (ML) INJECTION EVERY 12 HOURS SCHEDULED
Status: DISCONTINUED | OUTPATIENT
Start: 2020-09-02 | End: 2020-09-03 | Stop reason: HOSPADM

## 2020-09-02 RX ADMIN — FENTANYL CITRATE 100 MCG: 50 INJECTION INTRAMUSCULAR; INTRAVENOUS at 19:57

## 2020-09-02 RX ADMIN — SODIUM CHLORIDE, POTASSIUM CHLORIDE, SODIUM LACTATE AND CALCIUM CHLORIDE: 600; 310; 30; 20 INJECTION, SOLUTION INTRAVENOUS at 19:31

## 2020-09-02 RX ADMIN — DEXAMETHASONE SODIUM PHOSPHATE 8 MG: 10 INJECTION INTRAMUSCULAR; INTRAVENOUS at 19:10

## 2020-09-02 RX ADMIN — FENTANYL CITRATE 25 MCG: 50 INJECTION INTRAMUSCULAR; INTRAVENOUS at 19:29

## 2020-09-02 RX ADMIN — FAMOTIDINE 20 MG: 10 INJECTION INTRAVENOUS at 15:59

## 2020-09-02 RX ADMIN — FENTANYL CITRATE 50 MCG: 50 INJECTION INTRAMUSCULAR; INTRAVENOUS at 16:01

## 2020-09-02 RX ADMIN — FENTANYL CITRATE 25 MCG: 50 INJECTION INTRAMUSCULAR; INTRAVENOUS at 19:07

## 2020-09-02 RX ADMIN — KETOROLAC TROMETHAMINE 15 MG: 30 INJECTION, SOLUTION INTRAMUSCULAR; INTRAVENOUS at 21:01

## 2020-09-02 RX ADMIN — EPHEDRINE SULFATE 10 MG: 50 INJECTION, SOLUTION INTRAVENOUS at 17:42

## 2020-09-02 RX ADMIN — VECURONIUM BROMIDE 6 MG: 1 INJECTION, POWDER, LYOPHILIZED, FOR SOLUTION INTRAVENOUS at 16:01

## 2020-09-02 RX ADMIN — EPHEDRINE SULFATE 10 MG: 50 INJECTION, SOLUTION INTRAVENOUS at 18:39

## 2020-09-02 RX ADMIN — FENTANYL CITRATE 25 MCG: 50 INJECTION INTRAMUSCULAR; INTRAVENOUS at 17:54

## 2020-09-02 RX ADMIN — EPHEDRINE SULFATE 10 MG: 50 INJECTION, SOLUTION INTRAVENOUS at 18:09

## 2020-09-02 RX ADMIN — FENTANYL CITRATE 50 MCG: 50 INJECTION INTRAMUSCULAR; INTRAVENOUS at 20:27

## 2020-09-02 RX ADMIN — LIDOCAINE HYDROCHLORIDE 60 MG: 20 INJECTION, SOLUTION EPIDURAL; INFILTRATION; INTRACAUDAL; PERINEURAL at 16:01

## 2020-09-02 RX ADMIN — SODIUM CHLORIDE, POTASSIUM CHLORIDE, SODIUM LACTATE AND CALCIUM CHLORIDE 125 ML/HR: 600; 310; 30; 20 INJECTION, SOLUTION INTRAVENOUS at 14:25

## 2020-09-02 RX ADMIN — SCOPALAMINE 1 PATCH: 1 PATCH, EXTENDED RELEASE TRANSDERMAL at 14:26

## 2020-09-02 RX ADMIN — EPHEDRINE SULFATE 10 MG: 50 INJECTION, SOLUTION INTRAVENOUS at 17:21

## 2020-09-02 RX ADMIN — FENTANYL CITRATE 50 MCG: 50 INJECTION INTRAMUSCULAR; INTRAVENOUS at 20:17

## 2020-09-02 RX ADMIN — FENTANYL CITRATE 25 MCG: 50 INJECTION INTRAMUSCULAR; INTRAVENOUS at 18:30

## 2020-09-02 RX ADMIN — FENTANYL CITRATE 50 MCG: 50 INJECTION INTRAMUSCULAR; INTRAVENOUS at 17:37

## 2020-09-02 RX ADMIN — PROPOFOL 150 MG: 10 INJECTION, EMULSION INTRAVENOUS at 16:01

## 2020-09-02 RX ADMIN — ONDANSETRON 4 MG: 2 INJECTION INTRAMUSCULAR; INTRAVENOUS at 19:10

## 2020-09-02 RX ADMIN — CEFAZOLIN SODIUM 2 G: 2 SOLUTION INTRAVENOUS at 16:08

## 2020-09-02 NOTE — ANESTHESIA PROCEDURE NOTES
Airway  Urgency: elective    Date/Time: 9/2/2020 4:05 PM  Airway not difficult    General Information and Staff    Patient location during procedure: OR    Indications and Patient Condition  Indications for airway management: airway protection    Preoxygenated: yes  Mask difficulty assessment: 1 - vent by mask    Final Airway Details  Final airway type: endotracheal airway      Successful airway: ETT  Cuffed: yes   Successful intubation technique: direct laryngoscopy  Facilitating devices/methods: intubating stylet  Blade: Valente  Blade size: 3  ETT size (mm): 7.0  Cormack-Lehane Classification: grade I - full view of glottis  Placement verified by: chest auscultation, capnometry and palpation of cuff   Measured from: lips  Number of attempts at approach: 1  Assessment: lips, teeth, and gum same as pre-op and atraumatic intubation

## 2020-09-02 NOTE — ANESTHESIA PREPROCEDURE EVALUATION
Anesthesia Evaluation     Patient summary reviewed and Nursing notes reviewed   history of anesthetic complications: PONV  NPO Solid Status: > 8 hours  NPO Liquid Status: > 8 hours           Airway   Mallampati: II  TM distance: >3 FB  Neck ROM: full  No difficulty expected  Dental - normal exam   (+) poor dentition    Pulmonary - normal exam   (+) a smoker Former, asthma (mild),sleep apnea (resolved with weight loss),   Cardiovascular - normal exam  Exercise tolerance: good (4-7 METS)    NYHA Classification: II    (+) hypertension well controlled, valvular problems/murmurs MVP,     ROS comment: Patient reports a negative heart cath about 18 yrs ago    Neuro/Psych  (+) psychiatric history Anxiety and Depression,     GI/Hepatic/Renal/Endo    (+) obesity,  GERD,      Musculoskeletal     Abdominal    Substance History - negative use     OB/GYN negative ob/gyn ROS         Other   arthritis,      ROS/Med Hx Other: Patient states her sister has pseudocholinesterase deficiency                    Anesthesia Plan    ASA 2     general   (NO VERSED)  intravenous induction     Anesthetic plan, all risks, benefits, and alternatives have been provided, discussed and informed consent has been obtained with: patient.    Plan discussed with CRNA.

## 2020-09-03 VITALS
BODY MASS INDEX: 35.85 KG/M2 | SYSTOLIC BLOOD PRESSURE: 102 MMHG | TEMPERATURE: 98.4 F | DIASTOLIC BLOOD PRESSURE: 62 MMHG | OXYGEN SATURATION: 99 % | RESPIRATION RATE: 18 BRPM | WEIGHT: 196 LBS | HEART RATE: 76 BPM

## 2020-09-03 PROCEDURE — G0378 HOSPITAL OBSERVATION PER HR: HCPCS

## 2020-09-03 PROCEDURE — 25010000003 CEFAZOLIN SODIUM-DEXTROSE 2-3 GM-%(50ML) RECONSTITUTED SOLUTION: Performed by: SURGERY

## 2020-09-03 PROCEDURE — 25010000002 KETOROLAC TROMETHAMINE PER 15 MG: Performed by: SURGERY

## 2020-09-03 RX ORDER — CEFDINIR 300 MG/1
300 CAPSULE ORAL 2 TIMES DAILY
Qty: 14 CAPSULE | Refills: 0 | Status: SHIPPED | OUTPATIENT
Start: 2020-09-03 | End: 2020-09-03 | Stop reason: SDUPTHER

## 2020-09-03 RX ORDER — POLYETHYLENE GLYCOL 3350 17 G/17G
17 POWDER, FOR SOLUTION ORAL 2 TIMES DAILY
Qty: 14 PACKET | Refills: 0 | Status: SHIPPED | OUTPATIENT
Start: 2020-09-03 | End: 2020-09-03

## 2020-09-03 RX ORDER — POLYETHYLENE GLYCOL 3350 17 G/17G
17 POWDER, FOR SOLUTION ORAL 2 TIMES DAILY
Qty: 14 PACKET | Refills: 0 | Status: SHIPPED | OUTPATIENT
Start: 2020-09-03 | End: 2020-09-10

## 2020-09-03 RX ORDER — HYDROCODONE BITARTRATE AND ACETAMINOPHEN 7.5; 325 MG/1; MG/1
1 TABLET ORAL 4 TIMES DAILY PRN
Qty: 20 TABLET | Refills: 0 | Status: SHIPPED | OUTPATIENT
Start: 2020-09-03 | End: 2023-03-30

## 2020-09-03 RX ORDER — CEFDINIR 300 MG/1
300 CAPSULE ORAL 2 TIMES DAILY
Qty: 14 CAPSULE | Refills: 0 | Status: SHIPPED | OUTPATIENT
Start: 2020-09-03 | End: 2020-09-10

## 2020-09-03 RX ORDER — PSEUDOEPHEDRINE HCL 30 MG
100 TABLET ORAL 2 TIMES DAILY
COMMUNITY
Start: 2020-09-03

## 2020-09-03 RX ADMIN — CEFAZOLIN SODIUM 2 G: 2 SOLUTION INTRAVENOUS at 00:40

## 2020-09-03 RX ADMIN — KETOROLAC TROMETHAMINE 15 MG: 30 INJECTION, SOLUTION INTRAMUSCULAR; INTRAVENOUS at 09:40

## 2020-09-03 RX ADMIN — DOCUSATE SODIUM 100 MG: 100 CAPSULE ORAL at 08:43

## 2020-09-03 RX ADMIN — ALBUTEROL SULFATE 2 PUFF: 90 AEROSOL, METERED RESPIRATORY (INHALATION) at 13:41

## 2020-09-03 RX ADMIN — CEFAZOLIN SODIUM 2 G: 2 SOLUTION INTRAVENOUS at 08:43

## 2020-09-03 RX ADMIN — POLYETHYLENE GLYCOL (3350) 17 G: 17 POWDER, FOR SOLUTION ORAL at 08:43

## 2020-09-03 RX ADMIN — ACETAMINOPHEN 1000 MG: 500 TABLET ORAL at 05:34

## 2020-09-03 RX ADMIN — PANTOPRAZOLE SODIUM 40 MG: 40 TABLET, DELAYED RELEASE ORAL at 08:43

## 2020-09-03 RX ADMIN — ACETAMINOPHEN 1000 MG: 500 TABLET ORAL at 09:40

## 2020-09-03 RX ADMIN — OXYCODONE HYDROCHLORIDE 10 MG: 5 TABLET ORAL at 00:42

## 2020-09-03 RX ADMIN — KETOROLAC TROMETHAMINE 15 MG: 30 INJECTION, SOLUTION INTRAMUSCULAR; INTRAVENOUS at 05:33

## 2020-09-03 NOTE — ANESTHESIA POSTPROCEDURE EVALUATION
Patient: Shaun Morales    Procedure Summary     Date:  09/02/20 Room / Location:   [facility] 02 /  [facility]    Anesthesia Start:  1558 Anesthesia Stop:  2002    Procedure:  PANNICULECTOMY (N/A Abdomen) Diagnosis:       Abdominal pannus      (Abdominal pannus [E65])    Surgeon:  Kelsi Estrada MD Provider:  Jac Lee MD    Anesthesia Type:  general ASA Status:  2          Anesthesia Type: general    Vitals  Vitals Value Taken Time   /77 9/2/2020  8:33 PM   Temp 98 °F (36.7 °C) 9/2/2020  8:03 PM   Pulse 64 9/2/2020  8:33 PM   Resp 16 9/2/2020  8:33 PM   SpO2 96 % 9/2/2020  8:33 PM           Post Anesthesia Care and Evaluation    Patient location during evaluation: PACU  Patient participation: complete - patient participated  Level of consciousness: awake and alert  Pain score: 1  Pain management: adequate  Airway patency: patent  Anesthetic complications: No anesthetic complications  PONV Status: controlled  Cardiovascular status: acceptable  Respiratory status: acceptable  Hydration status: acceptable

## 2020-09-03 NOTE — DISCHARGE SUMMARY
[facility] GENERAL SURGERY DISCHARGE SUMMARY < 48 HOURS    Patient Identification:  Name:  Shaun Morales  Age:  50 y.o.  Sex:  female  :  1970  MRN:  [id number]    Date of Admission: 2020  Date of Discharge:  9/3/2020     ADMISSION DIAGNOSIS: Abdominal pannus    DISCHARGE DIAGNOSIS:  Same    PROCEDURES PERFORMED:  Procedure(s):  PANNICULECTOMY       HOSPITAL COURSE  Patient is a 50 y.o. female admitted to [facility] postop care after surgery.  Please see the admitting history and physical for further details.  Postoperatively the patient has done well.  She is voiding and tolerating a diet.  On examination all skin is viable.  Drains are working well.  Patient is tolerating oral pain medication.    CONDITION AT DISCHARGE:  Improved    DISCHARGE DISPOSITION   Home    DISCHARGE MEDICATIONS:     Discharge Medications      New Medications      Instructions Start Date   cefdinir 300 MG capsule  Commonly known as:  OMNICEF   300 mg, Oral, 2 Times Daily      docusate sodium 100 MG capsule   100 mg, Oral, 2 Times Daily      HYDROcodone-acetaminophen 7.5-325 MG per tablet  Commonly known as:  Norco   1 tablet, Oral, 4 Times Daily PRN      polyethylene glycol 17 g packet  Commonly known as:  MIRALAX   17 g, Oral, 2 times daily         Continue These Medications      Instructions Start Date   albuterol sulfate  (90 Base) MCG/ACT inhaler  Commonly known as:  PROVENTIL HFA;VENTOLIN HFA;PROAIR HFA   2 puffs, Inhalation, Every 4 Hours PRN      lisinopril 20 MG tablet  Commonly known as:  PRINIVIL,ZESTRIL   20 mg, Oral, Daily      omeprazole 40 MG capsule  Commonly known as:  priLOSEC   40 mg, Oral, 2 times daily      vitamin D 1.25 MG (92589 UT) capsule capsule  Commonly known as:  ERGOCALCIFEROL   50,000 Units, Oral, Weekly, Prior to [facility] Admission, Patient was on: takes on              FOLLOW-UP:  Dr. Estrada in 1 week    Activity and diet instructions given to the  patient

## 2020-09-03 NOTE — OP NOTE
Panniculectomy    Surgeon:  Kelsi Estrada M.D., HOLLAND    Assistant;  Hector    Pre-op:  Symptomatic abdominal pannus    Post-op:  Symptomatic abdominal pannus    Anesthesia:  general    Indications: symptomatic abdominal pannus       Procedure Details   After obtaining informed consent and receiving preoperative antibiotics and with venous compression boots in place, and after markings were placed in the pre-op area,  the patient was taken to the operating room and placed under anesthesia.  Medley catheter was placed. The abdomen was prepped and draped in a sterile fashion.  An elliptical incision oriented in the vertical plane was made around the umbilicus and carried down to the fascia.  A large transverse incision was then made from lateral to the anterior superior iliac spine to the other side.  The Harmonic scalpel was then used to dissect along the fascia from the pubis to the xiphoid.  The bed was angled 15 degrees and the appropriate level of skin transection was determined.  The excess skin and subQ was transected.  An incision was then made for the umbilicus in the midline.  Multiple 1 Vicryl sutures were used to close the dead space by tacking the fascia to Dayo's fascia.  The incision was then closed over 2 NOMI drains with 1 Vicryl to Dayo's fascia, 0 Vicryl in the subQ, and 3-0 Monocryl for the skin.  The umbilicus was closed with 3-0 Vicryl subdermal sutures and a 4-0 Monocryl.  NOMI's were sewn in with 2-0 silk.  Dressing and abdominal binder were placed.  Patient tolerated the procedure well and was taken to the recovery room in stable condition.    Findings:  9.0 lbs removed    Estimated Blood Loss:  100 mL    Blood administered:  none           Drains: NOMI x 2           Specimens: None     Grafts and Implants: No       Complications:  none           Disposition: PACU - hemodynamically stable.           Condition: stable

## 2020-09-03 NOTE — DISCHARGE INSTRUCTIONS
Empty your drains and record on paper provided twice a day .  Strip the tubing as shown twice a day.  Bring this record with you to your appointment on September 10th   Notify your doctor for fever, chills, worsening abdominal pain, redness at your incision site or drainage.  See attached education sheets.   You may shower but no tub baths or submersion in water for at least 6 weeks.,  No driving until released by your doctor.

## 2020-09-10 ENCOUNTER — OFFICE VISIT (OUTPATIENT)
Dept: SURGERY | Facility: CLINIC | Age: 50
End: 2020-09-10

## 2020-09-10 VITALS
HEART RATE: 73 BPM | WEIGHT: 196 LBS | BODY MASS INDEX: 36.07 KG/M2 | HEIGHT: 62 IN | SYSTOLIC BLOOD PRESSURE: 141 MMHG | DIASTOLIC BLOOD PRESSURE: 95 MMHG

## 2020-09-10 DIAGNOSIS — Z09 POSTOP CHECK: ICD-10-CM

## 2020-09-10 DIAGNOSIS — E65 ABDOMINAL PANNUS: Primary | ICD-10-CM

## 2020-09-10 PROCEDURE — 99024 POSTOP FOLLOW-UP VISIT: CPT | Performed by: SURGERY

## 2020-09-10 NOTE — PROGRESS NOTES
"Subjective   Shaun Morales is a 50 y.o. female here today for post op.    History of Present Illness  Ms. Morales was seen in the office today for her first postoperative visit following a panniculectomy.  She voices no complaints other than she has had issues with constipation, although she does have chronic issues as well.  She states she is pleased with the outcome of the surgery.  Allergies   Allergen Reactions   • Amlodipine GI Intolerance and Arrhythmia   • Nifedipine GI Intolerance and Arrhythmia     Adalat, procardia   • Morphine And Related Itching     Dilaudid ok, percocet/lortab ok   • Adhesive Tape Rash     Can tolerate steristrips and skin glue   • Chlorhexidine Rash   • Diazepam Anxiety     \"reverse effect,\" \"makes me absolutely crazy\"   • Midazolam Anxiety   • Nsaids Unknown (See Comments)     Pt states she cannot take NSAIDs for a peroid of time after having her Gastric Sleeve Surgery    • Other Other (See Comments)     Pt states she cannot taken Steroids for some time after Gastric sleeve operation    • Sulfa Antibiotics Rash         Current Outpatient Medications   Medication Sig Dispense Refill   • albuterol sulfate  (90 Base) MCG/ACT inhaler Inhale 2 puffs Every 4 (Four) Hours As Needed for Wheezing or Shortness of Air. 1 inhaler 0   • cefdinir (OMNICEF) 300 MG capsule Take 1 capsule by mouth 2 (Two) Times a Day for 7 days. 14 capsule 0   • docusate sodium 100 MG capsule Take 100 mg by mouth 2 (Two) Times a Day.     • HYDROcodone-acetaminophen (Norco) 7.5-325 MG per tablet Take 1 tablet by mouth 4 (Four) Times a Day As Needed for Moderate Pain . 20 tablet 0   • lisinopril (PRINIVIL,ZESTRIL) 20 MG tablet Take 20 mg by mouth Daily.     • omeprazole (priLOSEC) 40 MG capsule Take 40 mg by mouth 2 (two) times a day.     • polyethylene glycol (MIRALAX) 17 g packet Take 17 g (1 packet) mixed in liquid by mouth 2 (two) times a day for 7 days. 14 packet 0   • vitamin D (ERGOCALCIFEROL) 37555 units " "capsule capsule Take 50,000 Units by mouth 1 (One) Time Per Week. Prior to [facility] Admission, Patient was on: takes on wednesdays       No current facility-administered medications for this visit.        Objective   Ht 157.5 cm (62\")   Wt 88.9 kg (196 lb)   LMP  (LMP Unknown) Comment: hysterectomy  BMI 35.85 kg/m²    Physical Exam  On examination this is a well-developed well-nourished female in no acute distress  HEENT examination: Within normal limits.  Conjunctiva pink.  Nose and ears appear normal.  Neck: Supple, full range of motion.  No JVD.  Musculoskeletal: Full range of motion all extremities without focal weakness. Normal gait. No digital cyanosis.  Psych: Patient is alert, oriented x3. Mood and affect are appropriate.  Skin: All skin is viable.  No erythema or drainage.  After review of NOMI output the NOMI drains were removed.  Results/Data      Procedures     Assessment/Plan   Stable course, status post panniculectomy    Follow-up in 1 week       Discussion/Summary  Patient's Body mass index is 35.85 kg/m². BMI is above normal parameters. Recommendations include: educational material.    Future Appointments   Date Time Provider Department Center   10/26/2020 11:00 AM Bety Baker PA [facility] None         Please note that portions of this note were completed with a voice recognition program.  "

## 2020-09-11 ENCOUNTER — HOSPITAL ENCOUNTER (EMERGENCY)
Facility: HOSPITAL | Age: 50
Discharge: HOME | End: 2020-09-11
Admitting: EMERGENCY MEDICINE

## 2020-09-11 ENCOUNTER — APPOINTMENT (OUTPATIENT)
Dept: GENERAL RADIOLOGY | Facility: HOSPITAL | Age: 50
End: 2020-09-11

## 2020-09-11 ENCOUNTER — APPOINTMENT (OUTPATIENT)
Dept: CT IMAGING | Facility: HOSPITAL | Age: 50
End: 2020-09-11

## 2020-09-11 VITALS
RESPIRATION RATE: 19 BRPM | TEMPERATURE: 99.3 F | DIASTOLIC BLOOD PRESSURE: 78 MMHG | HEIGHT: 62 IN | SYSTOLIC BLOOD PRESSURE: 138 MMHG | WEIGHT: 186 LBS | OXYGEN SATURATION: 98 % | HEART RATE: 85 BPM | BODY MASS INDEX: 34.23 KG/M2

## 2020-09-11 DIAGNOSIS — L24.A9 WOUND DRAINAGE: Primary | ICD-10-CM

## 2020-09-11 LAB
ALBUMIN SERPL-MCNC: 3.98 G/DL (ref 3.5–5.2)
ALBUMIN/GLOB SERPL: 1.3 G/DL
ALP SERPL-CCNC: 92 U/L (ref 39–117)
ALT SERPL W P-5'-P-CCNC: 6 U/L (ref 1–33)
ANION GAP SERPL CALCULATED.3IONS-SCNC: 9.7 MMOL/L (ref 5–15)
AST SERPL-CCNC: 11 U/L (ref 1–32)
BASOPHILS # BLD AUTO: 0.08 10*3/MM3 (ref 0–0.2)
BASOPHILS NFR BLD AUTO: 0.7 % (ref 0–1.5)
BILIRUB SERPL-MCNC: 0.2 MG/DL (ref 0–1.2)
BILIRUB UR QL STRIP: NEGATIVE
BUN SERPL-MCNC: 14 MG/DL (ref 6–20)
BUN/CREAT SERPL: 24.6 (ref 7–25)
CALCIUM SPEC-SCNC: 8.7 MG/DL (ref 8.6–10.5)
CHLORIDE SERPL-SCNC: 103 MMOL/L (ref 98–107)
CLARITY UR: CLEAR
CO2 SERPL-SCNC: 22.3 MMOL/L (ref 22–29)
COLOR UR: YELLOW
CREAT SERPL-MCNC: 0.57 MG/DL (ref 0.57–1)
CRP SERPL-MCNC: 4.23 MG/DL (ref 0–0.5)
D-LACTATE SERPL-SCNC: 0.8 MMOL/L (ref 0.5–2)
DEPRECATED RDW RBC AUTO: 42.1 FL (ref 37–54)
EOSINOPHIL # BLD AUTO: 0.77 10*3/MM3 (ref 0–0.4)
EOSINOPHIL NFR BLD AUTO: 7.2 % (ref 0.3–6.2)
ERYTHROCYTE [DISTWIDTH] IN BLOOD BY AUTOMATED COUNT: 12.3 % (ref 12.3–15.4)
FLUAV AG NPH QL: NEGATIVE
FLUBV AG NPH QL IA: NEGATIVE
GFR SERPL CREATININE-BSD FRML MDRD: 113 ML/MIN/1.73
GLOBULIN UR ELPH-MCNC: 3.1 GM/DL
GLUCOSE SERPL-MCNC: 122 MG/DL (ref 65–99)
GLUCOSE UR STRIP-MCNC: NEGATIVE MG/DL
HCT VFR BLD AUTO: 35.8 % (ref 34–46.6)
HGB BLD-MCNC: 11.8 G/DL (ref 12–15.9)
HGB UR QL STRIP.AUTO: NEGATIVE
HOLD SPECIMEN: NORMAL
HOLD SPECIMEN: NORMAL
IMM GRANULOCYTES # BLD AUTO: 0.03 10*3/MM3 (ref 0–0.05)
IMM GRANULOCYTES NFR BLD AUTO: 0.3 % (ref 0–0.5)
KETONES UR QL STRIP: NEGATIVE
LEUKOCYTE ESTERASE UR QL STRIP.AUTO: NEGATIVE
LIPASE SERPL-CCNC: 19 U/L (ref 13–60)
LYMPHOCYTES # BLD AUTO: 2.39 10*3/MM3 (ref 0.7–3.1)
LYMPHOCYTES NFR BLD AUTO: 22.4 % (ref 19.6–45.3)
MCH RBC QN AUTO: 30.4 PG (ref 26.6–33)
MCHC RBC AUTO-ENTMCNC: 33 G/DL (ref 31.5–35.7)
MCV RBC AUTO: 92.3 FL (ref 79–97)
MONOCYTES # BLD AUTO: 1.07 10*3/MM3 (ref 0.1–0.9)
MONOCYTES NFR BLD AUTO: 10 % (ref 5–12)
NEUTROPHILS NFR BLD AUTO: 59.4 % (ref 42.7–76)
NEUTROPHILS NFR BLD AUTO: 6.33 10*3/MM3 (ref 1.7–7)
NITRITE UR QL STRIP: NEGATIVE
NRBC BLD AUTO-RTO: 0 /100 WBC (ref 0–0.2)
PH UR STRIP.AUTO: 7 [PH] (ref 5–8)
PLATELET # BLD AUTO: 381 10*3/MM3 (ref 140–450)
PMV BLD AUTO: 11 FL (ref 6–12)
POTASSIUM SERPL-SCNC: 3.8 MMOL/L (ref 3.5–5.2)
PROT SERPL-MCNC: 7.1 G/DL (ref 6–8.5)
PROT UR QL STRIP: NEGATIVE
RBC # BLD AUTO: 3.88 10*6/MM3 (ref 3.77–5.28)
SODIUM SERPL-SCNC: 135 MMOL/L (ref 136–145)
SP GR UR STRIP: >1.03 (ref 1–1.03)
UROBILINOGEN UR QL STRIP: ABNORMAL
WBC # BLD AUTO: 10.67 10*3/MM3 (ref 3.4–10.8)
WHOLE BLOOD HOLD SPECIMEN: NORMAL
WHOLE BLOOD HOLD SPECIMEN: NORMAL

## 2020-09-11 PROCEDURE — 99284 EMERGENCY DEPT VISIT MOD MDM: CPT

## 2020-09-11 PROCEDURE — 83690 ASSAY OF LIPASE: CPT | Performed by: PHYSICIAN ASSISTANT

## 2020-09-11 PROCEDURE — 80053 COMPREHEN METABOLIC PANEL: CPT | Performed by: PHYSICIAN ASSISTANT

## 2020-09-11 PROCEDURE — 87040 BLOOD CULTURE FOR BACTERIA: CPT | Performed by: PHYSICIAN ASSISTANT

## 2020-09-11 PROCEDURE — 74177 CT ABD & PELVIS W/CONTRAST: CPT

## 2020-09-11 PROCEDURE — 87804 INFLUENZA ASSAY W/OPTIC: CPT | Performed by: PHYSICIAN ASSISTANT

## 2020-09-11 PROCEDURE — 83605 ASSAY OF LACTIC ACID: CPT | Performed by: PHYSICIAN ASSISTANT

## 2020-09-11 PROCEDURE — 71045 X-RAY EXAM CHEST 1 VIEW: CPT

## 2020-09-11 PROCEDURE — 86140 C-REACTIVE PROTEIN: CPT | Performed by: PHYSICIAN ASSISTANT

## 2020-09-11 PROCEDURE — 0 IOVERSOL 68 % SOLUTION: Performed by: PHYSICIAN ASSISTANT

## 2020-09-11 PROCEDURE — 81003 URINALYSIS AUTO W/O SCOPE: CPT | Performed by: PHYSICIAN ASSISTANT

## 2020-09-11 PROCEDURE — 85025 COMPLETE CBC W/AUTO DIFF WBC: CPT | Performed by: PHYSICIAN ASSISTANT

## 2020-09-11 RX ORDER — SODIUM CHLORIDE 0.9 % (FLUSH) 0.9 %
10 SYRINGE (ML) INJECTION AS NEEDED
Status: DISCONTINUED | OUTPATIENT
Start: 2020-09-11 | End: 2020-09-12 | Stop reason: HOSPADM

## 2020-09-11 RX ADMIN — IOVERSOL 90 ML: 678 INJECTION INTRA-ARTERIAL; INTRAVENOUS at 22:32

## 2020-09-11 RX ADMIN — SODIUM CHLORIDE 1000 ML: 9 INJECTION, SOLUTION INTRAVENOUS at 21:25

## 2020-09-12 NOTE — ED PROVIDER NOTES
"Subjective   50 year old female presents to the ED with post op issue. Patient states she had a panniculectomy on 09/02/20 by Dr. Estrada and has now developed fever and chills. She states she had drains removed yesterday and was seen in Dr. Estrada's office and everything was fine, now today has developed fever. She has noticed minimal yellow drainage. Called Dr. Estrada's office and they recommended that she come to the ER for further evaluation of infection. Does report fever of 102-103, but denies cough, dysuria, chest pain, or back pain. Denies any aggravating factors.       History provided by:  Patient   used: No        Review of Systems   Constitutional: Positive for chills and fever.   HENT: Negative.    Respiratory: Negative.    Cardiovascular: Negative.  Negative for chest pain.   Gastrointestinal: Positive for abdominal pain.   Endocrine: Negative.    Genitourinary: Negative.  Negative for dysuria.   Skin: Negative.    Neurological: Negative.    Psychiatric/Behavioral: Negative.    All other systems reviewed and are negative.      Past Medical History:   Diagnosis Date   • Anxiety    • Asthma     mild   • Back pain    • Depression    • Elevated cholesterol    • Environmental allergies     pollen, pollution   • Family history of pseudocholinesterase deficiency     sister, dad had it.  Unsure if she has it, but reports has never had succinylcholine   • Fatigue    • GERD (gastroesophageal reflux disease)     rarely, associated with onions and red sauces, avoids both.  PRN Tums, EGD with Dr. Rich 2017, op report reviewed, no HH. urease neg. serum h. pyl neg   • H. pylori infection     symptoms of abodminal pain/dyspepsia, tx   • History of MRSA infection 2012    UTI WITH REPORRTED 3-4 CLEAN CATCH WWITH NO mrSA    • Hyperlipidemia    • Hypertension    • IBS (irritable bowel syndrome)     constipation   • Interstitial cystitis     recent procedure to \"stretch\" the bladder, feels better; has " "pain in bladder as primary symptom, dyspareunia   • Mitral valve regurgitation     better now, has no symptoms, + hear murmur   • Mood disorder (CMS/HCC)     no formal dx of bipolar disorder, but after bad divorce took lithium   • Nausea     car sickness, has PRN Zofran, motion sickness, previously took meclizine   • Peripheral edema    • PONV (postoperative nausea and vomiting)    • Prediabetes    • Psoriasis    • Psoriatic arthritis (CMS/HCC)    • Sleep apnea     no longer have since sleeve   • Trauma of chest      moving trailer, fell on her, several cracked ribs on left, feels it caused her umbo hernia recurrence.  had to be dug out.  Exhusband fx pelvis, mult surgeries.   • Vitamin D deficiency        Allergies   Allergen Reactions   • Amlodipine GI Intolerance and Arrhythmia   • Nifedipine GI Intolerance and Arrhythmia     Adalat, procardia   • Morphine And Related Itching     Dilaudid ok, percocet/lortab ok   • Adhesive Tape Rash     Can tolerate steristrips and skin glue   • Chlorhexidine Rash   • Diazepam Anxiety     \"reverse effect,\" \"makes me absolutely crazy\"   • Midazolam Anxiety   • Nsaids Unknown (See Comments)     Pt states she cannot take NSAIDs for a peroid of time after having her Gastric Sleeve Surgery    • Other Other (See Comments)     Pt states she cannot taken Steroids for some time after Gastric sleeve operation    • Sulfa Antibiotics Rash       Past Surgical History:   Procedure Laterality Date   • APPENDECTOMY     • CARDIAC CATHETERIZATION      done after discovery of MVP, also had a CHERISE, normal per patient   •  SECTION     • COLONOSCOPY     • CYSTOSCOPY BLADDER HYDRODISTENSION N/A 2017    Procedure: CYSTOSCOPY BLADDER HYDRODISTENSION;  Surgeon: Ion Herbert MD;  Location: [facility];  Service:    • DIAGNOSTIC LAPAROSCOPY N/A 10/14/2016    Procedure: DIAGNOSTIC LAPAROSCOPY POSSIBLE RIGHT SALPINGOOPHORECTOMY;  Surgeon: Rory Owens, DO;  " Location:  [facility];  Service:    • ENDOSCOPY  2017    Dr. Rich   • ENDOSCOPY N/A 2/23/2018    Procedure: ESOPHAGOGASTRODUODENOSCOPY;  Surgeon: Aneesh Mckeon MD;  Location:  [facility];  Service:    • FOOT SURGERY  1984   • GASTRECTOMY     • GASTRIC SLEEVE LAPAROSCOPIC N/A 2/23/2018    Procedure: GASTRIC SLEEVE LAPAROSCOPIC;  Surgeon: Aneesh Mckeon MD;  Location:  [facility];  Service:    • HYSTERECTOMY  2008, 2016    laparoscopy supra-cervical hysterectomy 2008, 2016 cervix and 1 ovary removed.  Remaining ovary has a cyst. initially done for pelvic pain/fibroids   • LAPAROSCOPIC APPENDECTOMY  2015   • PANNICULECTOMY N/A 9/2/2020    Procedure: PANNICULECTOMY;  Surgeon: Kelsi Estrada MD;  Location:  [facility];  Service: General;  Laterality: N/A;   • PARAESOPHAGEAL HERNIA REPAIR N/A 2/23/2018    Procedure: PARAESOPHAGEAL HERNIA REPAIR LAPAROSCOPIC;  Surgeon: Aneesh Mckeon MD;  Location:  [facility];  Service:    • SKIN BIOPSY     • TRACHELECTOMY N/A 10/14/2016    Procedure: TRACHELECTOMY VAGINAL;  Surgeon: Rory Owens DO;  [facility]:  [facility];  Service:    • TUBAL ABDOMINAL LIGATION  2000    LEFT OVARY REMAINS   • UMBILICAL HERNIA REPAIR N/A 10/14/2016    Procedure: UMBILICAL HERNIA REPAIR;  Surgeon: Spike Porter MD;  [facility]:  [facility];  Service:    • UMBILICAL HERNIA REPAIR N/A 12/9/2016    Procedure: UMBILICAL HERNIA REPAIR;  Surgeon: Spike Porter MD;  Location:  [facility];  with mesh, supraumbilical   • WISDOM TOOTH EXTRACTION  2000       Family History   Problem Relation Age of Onset   • Diabetes Mother    • Hypertension Mother    • Stroke Mother    • Heart disease Mother    • Cancer Father    • Sleep apnea Father    • Diabetes Father    • Anesthesia problems Sister    • Heart attack Maternal Grandmother 70   • Obesity Maternal Grandmother    • Hypertension Maternal Grandmother    • Heart disease Maternal Grandmother    • Sleep apnea Maternal Grandmother    • Stroke Maternal  Grandmother    • Heart attack Maternal Grandfather    • Heart disease Maternal Grandfather    • Heart attack Paternal Grandmother 54   • Sleep apnea Paternal Grandmother    • Heart disease Paternal Grandmother    • Hypertension Paternal Grandmother    • Obesity Paternal Grandmother    • Rheum arthritis Neg Hx    • Osteoarthritis Neg Hx    • Asthma Neg Hx    • Heart failure Neg Hx    • Hyperlipidemia Neg Hx    • Migraines Neg Hx    • Rashes / Skin problems Neg Hx    • Seizures Neg Hx    • Thyroid disease Neg Hx    • Breast cancer Neg Hx        Social History     Socioeconomic History   • Marital status:      Spouse name: Not on file   • Number of children: Not on file   • Years of education: Not on file   • Highest education level: Not on file   Tobacco Use   • Smoking status: Former Smoker     Packs/day: 0.50     Years: 10.00     Pack years: 5.00     Types: Cigarettes     Quit date: 2013     Years since quittin.7   • Smokeless tobacco: Never Used   • Tobacco comment: Is around secondhand smoke occasionally in car but not in house.  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             Objective   Physical Exam   Constitutional: She is oriented to person, place, and time. She appears well-developed. No distress.   HENT:   Head: Normocephalic and atraumatic.   Right Ear: External ear normal.   Left Ear: External ear normal.   Nose: Nose normal.   Eyes: Pupils are equal, round, and reactive to light. Conjunctivae are normal.   Neck: Normal range of motion. Neck supple. No JVD present. No tracheal deviation present.   Cardiovascular: Normal rate, regular rhythm and normal heart sounds.   No murmur heard.  Pulmonary/Chest: Effort normal and breath sounds normal. No respiratory  distress. She has no wheezes.   Abdominal: Soft. Bowel sounds are normal. There is abdominal tenderness.   Incision sites appear to be healing well. No s/s of infection noted. Mild tenderness to palpation throughout abdomen.   Musculoskeletal: Normal range of motion. No deformity.   Neurological: She is alert and oriented to person, place, and time. No cranial nerve deficit.   Skin: Skin is warm and dry. No rash noted. She is not diaphoretic. No erythema. No pallor.   Psychiatric: Her behavior is normal. Thought content normal.   Nursing note and vitals reviewed.      Procedures           ED Course  ED Course as of Sep 17 1737   Fri Sep 11, 2020   2157 IMPRESSION:  No acute cardiopulmonary findings.     Signer Name: Troy Adamson MD   Signed: 9/11/2020 9:33 PM   Workstation Name: [employer]    [facility]   XR Chest 1 View [TK]   2303 IMPRESSION:    1. No clearly acute process in the abdomen or pelvis. Imaging findings suggestive of a reported history of recent surgery involving the anterior abdominal wall and subcutaneous fat. Trace amount of probable postoperative fluid and subcutaneous air  without distinct drainable fluid collection to suggest abscess at this time.  2. Postop changes of the stomach and esophagus. Appendix appears to be surgically absent along with the uterus.      Signer Name: Janusz Abbott MD  Signed: 9/11/2020 10:44 PM  Workstation Name: [facility]  [facility]   CT Abdomen Pelvis With Contrast [TK]      ED Course User Index  [TK] Onel Gamez PA-C                                           MDM  Number of Diagnoses or Management Options  Wound drainage: new and requires workup     Amount and/or Complexity of Data Reviewed  Clinical lab tests: reviewed and ordered  Tests in the radiology section of CPT®: reviewed and ordered  Independent visualization of images, tracings, or specimens: yes    Risk of Complications, Morbidity, and/or  Mortality  Presenting problems: moderate  Diagnostic procedures: moderate  Management options: moderate    Patient Progress  Patient progress: stable      Final diagnoses:   Wound drainage            Onel Gamez PA-C  09/17/20 1730

## 2020-09-16 LAB
BACTERIA SPEC AEROBE CULT: NORMAL
BACTERIA SPEC AEROBE CULT: NORMAL

## 2020-09-17 ENCOUNTER — OFFICE VISIT (OUTPATIENT)
Dept: SURGERY | Facility: CLINIC | Age: 50
End: 2020-09-17

## 2020-09-17 VITALS
WEIGHT: 185 LBS | SYSTOLIC BLOOD PRESSURE: 170 MMHG | HEIGHT: 62 IN | BODY MASS INDEX: 34.04 KG/M2 | DIASTOLIC BLOOD PRESSURE: 93 MMHG | HEART RATE: 79 BPM

## 2020-09-17 DIAGNOSIS — Z09 POSTOP CHECK: ICD-10-CM

## 2020-09-17 DIAGNOSIS — E65 ABDOMINAL PANNUS: Primary | ICD-10-CM

## 2020-09-17 PROCEDURE — 99024 POSTOP FOLLOW-UP VISIT: CPT | Performed by: SURGERY

## 2020-09-17 NOTE — PROGRESS NOTES
"Subjective   Shaun Morales is a 50 y.o. female here today for follow up post op.    History of Present Illness  Ms. Morales was seen in the office today for her second postoperative visit following a panniculectomy.  Drains were removed last week.  Said no problems with drainage or other incision problems.    Allergies   Allergen Reactions   • Amlodipine GI Intolerance and Arrhythmia   • Nifedipine GI Intolerance and Arrhythmia     Adalat, procardia   • Morphine And Related Itching     Dilaudid ok, percocet/lortab ok   • Adhesive Tape Rash     Can tolerate steristrips and skin glue   • Chlorhexidine Rash   • Diazepam Anxiety     \"reverse effect,\" \"makes me absolutely crazy\"   • Midazolam Anxiety   • Nsaids Unknown (See Comments)     Pt states she cannot take NSAIDs for a peroid of time after having her Gastric Sleeve Surgery    • Other Other (See Comments)     Pt states she cannot taken Steroids for some time after Gastric sleeve operation    • Sulfa Antibiotics Rash         Current Outpatient Medications   Medication Sig Dispense Refill   • albuterol sulfate  (90 Base) MCG/ACT inhaler Inhale 2 puffs Every 4 (Four) Hours As Needed for Wheezing or Shortness of Air. 1 inhaler 0   • docusate sodium 100 MG capsule Take 100 mg by mouth 2 (Two) Times a Day.     • HYDROcodone-acetaminophen (Norco) 7.5-325 MG per tablet Take 1 tablet by mouth 4 (Four) Times a Day As Needed for Moderate Pain . 20 tablet 0   • lisinopril (PRINIVIL,ZESTRIL) 20 MG tablet Take 20 mg by mouth Daily.     • omeprazole (priLOSEC) 40 MG capsule Take 40 mg by mouth 2 (two) times a day.     • vitamin D (ERGOCALCIFEROL) 96308 units capsule capsule Take 50,000 Units by mouth 1 (One) Time Per Week. Prior to [facility] Admission, Patient was on: takes on wednesdays       No current facility-administered medications for this visit.        Objective   Ht 157.5 cm (62\")   Wt 83.9 kg (185 lb)   LMP  (LMP Unknown) Comment: hysterectomy  BMI 33.84 kg/m²  "   Physical Exam  On examination of the abdomen all skin is viable.  There is no erythema or drainage.  Small dogear in the right lateral abdomen    Results/Data      Procedures     Assessment/Plan   Stable course, status post panniculectomy    Follow-up in 4 weeks  Level of activity discussed       Discussion/Summary    Future Appointments   Date Time Provider Department Center   10/26/2020 11:00 AM Bety Baker PA [facility] None         Please note that portions of this note were completed with a voice recognition program.

## 2020-10-15 ENCOUNTER — OFFICE VISIT (OUTPATIENT)
Dept: SURGERY | Facility: CLINIC | Age: 50
End: 2020-10-15

## 2020-10-15 VITALS
SYSTOLIC BLOOD PRESSURE: 136 MMHG | BODY MASS INDEX: 34.04 KG/M2 | DIASTOLIC BLOOD PRESSURE: 91 MMHG | WEIGHT: 185 LBS | HEIGHT: 62 IN | HEART RATE: 77 BPM

## 2020-10-15 DIAGNOSIS — Z51.89 VISIT FOR WOUND CHECK: ICD-10-CM

## 2020-10-15 DIAGNOSIS — E65 ABDOMINAL PANNUS: Primary | ICD-10-CM

## 2020-10-15 PROCEDURE — 99024 POSTOP FOLLOW-UP VISIT: CPT | Performed by: SURGERY

## 2020-10-15 NOTE — PROGRESS NOTES
"Subjective   Shaun Morales is a 50 y.o. female here today for follow up.    History of Present Illness  Ms. Morales was seen in the office today for 6 weeks follow-up following a panniculectomy.  She reports some fullness on the right side of the abdomen and some occasional discomfort which seems to be asymmetric with respect to the contralateral side.    Allergies   Allergen Reactions   • Amlodipine GI Intolerance and Arrhythmia   • Nifedipine GI Intolerance and Arrhythmia     Adalat, procardia   • Morphine And Related Itching     Dilaudid ok, percocet/lortab ok   • Adhesive Tape Rash     Can tolerate steristrips and skin glue   • Chlorhexidine Rash   • Diazepam Anxiety     \"reverse effect,\" \"makes me absolutely crazy\"   • Midazolam Anxiety   • Nsaids Unknown (See Comments)     Pt states she cannot take NSAIDs for a peroid of time after having her Gastric Sleeve Surgery    • Other Other (See Comments)     Pt states she cannot taken Steroids for some time after Gastric sleeve operation    • Sulfa Antibiotics Rash         Current Outpatient Medications   Medication Sig Dispense Refill   • albuterol sulfate  (90 Base) MCG/ACT inhaler Inhale 2 puffs Every 4 (Four) Hours As Needed for Wheezing or Shortness of Air. 1 inhaler 0   • docusate sodium 100 MG capsule Take 100 mg by mouth 2 (Two) Times a Day.     • lisinopril (PRINIVIL,ZESTRIL) 20 MG tablet Take 20 mg by mouth Daily.     • omeprazole (priLOSEC) 40 MG capsule Take 40 mg by mouth 2 (two) times a day.     • vitamin D (ERGOCALCIFEROL) 67263 units capsule capsule Take 50,000 Units by mouth 1 (One) Time Per Week. Prior to [facility] Admission, Patient was on: takes on wednesdays     • HYDROcodone-acetaminophen (Norco) 7.5-325 MG per tablet Take 1 tablet by mouth 4 (Four) Times a Day As Needed for Moderate Pain . 20 tablet 0     No current facility-administered medications for this visit.        Objective   Ht 157.5 cm (62\")   Wt 83.9 kg (185 lb)   LMP  (LMP " Unknown) Comment: hysterectomy  BMI 33.84 kg/m²    Physical Exam  On examination this is a well-developed well-nourished female in no acute distress  HEENT examination: Within normal limits.  Conjunctiva pink.  Nose and ears appear normal.  Neck: Supple, full range of motion.  No JVD.  Musculoskeletal: Full range of motion all extremities without focal weakness. Normal gait. No digital cyanosis.  Psych: Patient is alert, oriented x3. Mood and affect are appropriate.  Skin: All skin is viable.  No evidence of seroma.  There is some mild asymmetry with slight fullness on the right side compared to the left.  Small dogear on the right  Results/Data      Procedures     Assessment/Plan   Status post panniculectomy    Follow-up as needed  Level of activity discussed       Discussion/Summary  Patient's Body mass index is 33.84 kg/m². BMI is above normal parameters. Recommendations include: educational material.      Future Appointments   Date Time Provider Department Center   10/26/2020 11:00 AM Bety Baker PA [facility] None         Please note that portions of this note were completed with a voice recognition program.

## 2021-03-18 ENCOUNTER — BULK ORDERING (OUTPATIENT)
Dept: CASE MANAGEMENT | Facility: OTHER | Age: 51
End: 2021-03-18

## 2021-03-18 DIAGNOSIS — Z23 IMMUNIZATION DUE: ICD-10-CM

## 2021-11-30 ENCOUNTER — TRANSCRIBE ORDERS (OUTPATIENT)
Dept: ADMINISTRATIVE | Facility: HOSPITAL | Age: 51
End: 2021-11-30

## 2021-11-30 DIAGNOSIS — R00.2 PALPITATIONS: Primary | ICD-10-CM

## 2021-12-20 ENCOUNTER — HOSPITAL ENCOUNTER (EMERGENCY)
Facility: HOSPITAL | Age: 51
Discharge: HOME | End: 2021-12-20
Attending: STUDENT IN AN ORGANIZED HEALTH CARE EDUCATION/TRAINING PROGRAM | Admitting: STUDENT IN AN ORGANIZED HEALTH CARE EDUCATION/TRAINING PROGRAM

## 2021-12-20 VITALS
HEART RATE: 63 BPM | TEMPERATURE: 98.7 F | OXYGEN SATURATION: 100 % | SYSTOLIC BLOOD PRESSURE: 149 MMHG | WEIGHT: 199 LBS | BODY MASS INDEX: 36.62 KG/M2 | HEIGHT: 62 IN | RESPIRATION RATE: 19 BRPM | DIASTOLIC BLOOD PRESSURE: 80 MMHG

## 2021-12-20 DIAGNOSIS — Z20.822 CLOSE EXPOSURE TO COVID-19 VIRUS: Primary | ICD-10-CM

## 2021-12-20 LAB
FLUAV RNA RESP QL NAA+PROBE: NOT DETECTED
FLUBV RNA RESP QL NAA+PROBE: NOT DETECTED
SARS-COV-2 RNA RESP QL NAA+PROBE: NOT DETECTED

## 2021-12-20 PROCEDURE — 99283 EMERGENCY DEPT VISIT LOW MDM: CPT

## 2021-12-20 PROCEDURE — M0245 HC IV INFUSION, BAMLANIVIMAB AND ETESEVIMAB, 2100 MG: HCPCS | Performed by: PHYSICIAN ASSISTANT

## 2021-12-20 PROCEDURE — 25010000002 INJECTION, BAMLANIVIMAB AND ETESEVIMAB, 2100 MG: Performed by: PHYSICIAN ASSISTANT

## 2021-12-20 PROCEDURE — 87636 SARSCOV2 & INF A&B AMP PRB: CPT | Performed by: STUDENT IN AN ORGANIZED HEALTH CARE EDUCATION/TRAINING PROGRAM

## 2021-12-20 RX ORDER — DIPHENHYDRAMINE HYDROCHLORIDE 50 MG/ML
50 INJECTION INTRAMUSCULAR; INTRAVENOUS ONCE AS NEEDED
Status: DISCONTINUED | OUTPATIENT
Start: 2021-12-20 | End: 2021-12-20 | Stop reason: HOSPADM

## 2021-12-20 RX ORDER — EPINEPHRINE 1 MG/ML
0.3 INJECTION, SOLUTION INTRAMUSCULAR; SUBCUTANEOUS ONCE AS NEEDED
Status: DISCONTINUED | OUTPATIENT
Start: 2021-12-20 | End: 2021-12-20 | Stop reason: HOSPADM

## 2021-12-20 RX ORDER — METHYLPREDNISOLONE SODIUM SUCCINATE 125 MG/2ML
125 INJECTION, POWDER, LYOPHILIZED, FOR SOLUTION INTRAMUSCULAR; INTRAVENOUS ONCE AS NEEDED
Status: DISCONTINUED | OUTPATIENT
Start: 2021-12-20 | End: 2021-12-20 | Stop reason: HOSPADM

## 2021-12-20 RX ORDER — DIPHENHYDRAMINE HCL 50 MG
50 CAPSULE ORAL ONCE AS NEEDED
Status: DISCONTINUED | OUTPATIENT
Start: 2021-12-20 | End: 2021-12-20 | Stop reason: HOSPADM

## 2021-12-20 RX ORDER — SODIUM CHLORIDE 9 MG/ML
30 INJECTION, SOLUTION INTRAVENOUS ONCE
Status: COMPLETED | OUTPATIENT
Start: 2021-12-20 | End: 2021-12-20

## 2021-12-20 RX ADMIN — SODIUM CHLORIDE: 9 INJECTION, SOLUTION INTRAVENOUS at 10:40

## 2021-12-20 RX ADMIN — SODIUM CHLORIDE 30 ML: 900 INJECTION INTRAVENOUS at 11:04
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
## 2022-02-09 ENCOUNTER — HOSPITAL ENCOUNTER (EMERGENCY)
Facility: HOSPITAL | Age: 52
Discharge: HOME | End: 2022-02-09
Attending: STUDENT IN AN ORGANIZED HEALTH CARE EDUCATION/TRAINING PROGRAM | Admitting: STUDENT IN AN ORGANIZED HEALTH CARE EDUCATION/TRAINING PROGRAM

## 2022-02-09 ENCOUNTER — APPOINTMENT (OUTPATIENT)
Dept: ULTRASOUND IMAGING | Facility: HOSPITAL | Age: 52
End: 2022-02-09

## 2022-02-09 VITALS
SYSTOLIC BLOOD PRESSURE: 142 MMHG | HEART RATE: 96 BPM | OXYGEN SATURATION: 97 % | WEIGHT: 198 LBS | DIASTOLIC BLOOD PRESSURE: 85 MMHG | BODY MASS INDEX: 36.44 KG/M2 | HEIGHT: 62 IN | TEMPERATURE: 98.5 F | RESPIRATION RATE: 20 BRPM

## 2022-02-09 DIAGNOSIS — M71.22 POPLITEAL CYST, LEFT: Primary | ICD-10-CM

## 2022-02-09 LAB
ALBUMIN SERPL-MCNC: 4.14 G/DL (ref 3.5–5.2)
ALBUMIN/GLOB SERPL: 1.2 G/DL
ALP SERPL-CCNC: 102 U/L (ref 39–117)
ALT SERPL W P-5'-P-CCNC: 18 U/L (ref 1–33)
ANION GAP SERPL CALCULATED.3IONS-SCNC: 9.9 MMOL/L (ref 5–15)
AST SERPL-CCNC: 17 U/L (ref 1–32)
BASOPHILS # BLD AUTO: 0.04 10*3/MM3 (ref 0–0.2)
BASOPHILS NFR BLD AUTO: 0.5 % (ref 0–1.5)
BILIRUB SERPL-MCNC: 0.2 MG/DL (ref 0–1.2)
BILIRUB UR QL STRIP: NEGATIVE
BUN SERPL-MCNC: 21 MG/DL (ref 6–20)
BUN/CREAT SERPL: 33.3 (ref 7–25)
CALCIUM SPEC-SCNC: 8.8 MG/DL (ref 8.6–10.5)
CHLORIDE SERPL-SCNC: 103 MMOL/L (ref 98–107)
CLARITY UR: CLEAR
CO2 SERPL-SCNC: 23.1 MMOL/L (ref 22–29)
COLOR UR: YELLOW
CREAT SERPL-MCNC: 0.63 MG/DL (ref 0.57–1)
CRP SERPL-MCNC: 0.32 MG/DL (ref 0–0.5)
DEPRECATED RDW RBC AUTO: 45.5 FL (ref 37–54)
EOSINOPHIL # BLD AUTO: 0.5 10*3/MM3 (ref 0–0.4)
EOSINOPHIL NFR BLD AUTO: 6 % (ref 0.3–6.2)
ERYTHROCYTE [DISTWIDTH] IN BLOOD BY AUTOMATED COUNT: 13.4 % (ref 12.3–15.4)
ERYTHROCYTE [SEDIMENTATION RATE] IN BLOOD: 19 MM/HR (ref 0–20)
GFR SERPL CREATININE-BSD FRML MDRD: 100 ML/MIN/1.73
GLOBULIN UR ELPH-MCNC: 3.5 GM/DL
GLUCOSE SERPL-MCNC: 104 MG/DL (ref 65–99)
GLUCOSE UR STRIP-MCNC: NEGATIVE MG/DL
HCT VFR BLD AUTO: 40.9 % (ref 34–46.6)
HGB BLD-MCNC: 13.2 G/DL (ref 12–15.9)
HGB UR QL STRIP.AUTO: NEGATIVE
IMM GRANULOCYTES # BLD AUTO: 0.02 10*3/MM3 (ref 0–0.05)
IMM GRANULOCYTES NFR BLD AUTO: 0.2 % (ref 0–0.5)
KETONES UR QL STRIP: NEGATIVE
LEUKOCYTE ESTERASE UR QL STRIP.AUTO: NEGATIVE
LYMPHOCYTES # BLD AUTO: 3.13 10*3/MM3 (ref 0.7–3.1)
LYMPHOCYTES NFR BLD AUTO: 37.4 % (ref 19.6–45.3)
MCH RBC QN AUTO: 29.6 PG (ref 26.6–33)
MCHC RBC AUTO-ENTMCNC: 32.3 G/DL (ref 31.5–35.7)
MCV RBC AUTO: 91.7 FL (ref 79–97)
MONOCYTES # BLD AUTO: 0.63 10*3/MM3 (ref 0.1–0.9)
MONOCYTES NFR BLD AUTO: 7.5 % (ref 5–12)
NEUTROPHILS NFR BLD AUTO: 4.04 10*3/MM3 (ref 1.7–7)
NEUTROPHILS NFR BLD AUTO: 48.4 % (ref 42.7–76)
NITRITE UR QL STRIP: NEGATIVE
NRBC BLD AUTO-RTO: 0 /100 WBC (ref 0–0.2)
PH UR STRIP.AUTO: 6.5 [PH] (ref 5–8)
PLATELET # BLD AUTO: 366 10*3/MM3 (ref 140–450)
PMV BLD AUTO: 11 FL (ref 6–12)
POTASSIUM SERPL-SCNC: 3.9 MMOL/L (ref 3.5–5.2)
PROT SERPL-MCNC: 7.6 G/DL (ref 6–8.5)
PROT UR QL STRIP: NEGATIVE
RBC # BLD AUTO: 4.46 10*6/MM3 (ref 3.77–5.28)
SODIUM SERPL-SCNC: 136 MMOL/L (ref 136–145)
SP GR UR STRIP: >1.03 (ref 1–1.03)
UROBILINOGEN UR QL STRIP: ABNORMAL
WBC NRBC COR # BLD: 8.36 10*3/MM3 (ref 3.4–10.8)

## 2022-02-09 PROCEDURE — 85652 RBC SED RATE AUTOMATED: CPT | Performed by: PHYSICIAN ASSISTANT

## 2022-02-09 PROCEDURE — 25010000002 METHYLPREDNISOLONE PER 80 MG: Performed by: STUDENT IN AN ORGANIZED HEALTH CARE EDUCATION/TRAINING PROGRAM

## 2022-02-09 PROCEDURE — 81003 URINALYSIS AUTO W/O SCOPE: CPT | Performed by: PHYSICIAN ASSISTANT

## 2022-02-09 PROCEDURE — 96372 THER/PROPH/DIAG INJ SC/IM: CPT

## 2022-02-09 PROCEDURE — 93971 EXTREMITY STUDY: CPT

## 2022-02-09 PROCEDURE — 99283 EMERGENCY DEPT VISIT LOW MDM: CPT

## 2022-02-09 PROCEDURE — 36415 COLL VENOUS BLD VENIPUNCTURE: CPT

## 2022-02-09 PROCEDURE — 85025 COMPLETE CBC W/AUTO DIFF WBC: CPT | Performed by: PHYSICIAN ASSISTANT

## 2022-02-09 PROCEDURE — 80053 COMPREHEN METABOLIC PANEL: CPT | Performed by: PHYSICIAN ASSISTANT

## 2022-02-09 PROCEDURE — 86140 C-REACTIVE PROTEIN: CPT | Performed by: PHYSICIAN ASSISTANT

## 2022-02-09 RX ORDER — HYDROCODONE BITARTRATE AND ACETAMINOPHEN 5; 325 MG/1; MG/1
1 TABLET ORAL EVERY 6 HOURS PRN
Status: DISCONTINUED | OUTPATIENT
Start: 2022-02-09 | End: 2022-02-09 | Stop reason: HOSPADM

## 2022-02-09 RX ORDER — METHYLPREDNISOLONE 4 MG/1
TABLET ORAL
Qty: 1 EACH | Refills: 0 | Status: SHIPPED | OUTPATIENT
Start: 2022-02-09 | End: 2023-03-30

## 2022-02-09 RX ORDER — KETOROLAC TROMETHAMINE 10 MG/1
15 TABLET, FILM COATED ORAL ONCE
Status: COMPLETED | OUTPATIENT
Start: 2022-02-09 | End: 2022-02-09

## 2022-02-09 RX ORDER — METHYLPREDNISOLONE ACETATE 80 MG/ML
80 INJECTION, SUSPENSION INTRA-ARTICULAR; INTRALESIONAL; INTRAMUSCULAR; SOFT TISSUE ONCE
Status: COMPLETED | OUTPATIENT
Start: 2022-02-09 | End: 2022-02-09

## 2022-02-09 RX ADMIN — HYDROCODONE BITARTRATE AND ACETAMINOPHEN 1 TABLET: 5; 325 TABLET ORAL at 21:43

## 2022-02-09 RX ADMIN — KETOROLAC TROMETHAMINE 15 MG: 10 TABLET, FILM COATED ORAL at 21:35

## 2022-02-09 RX ADMIN — METHYLPREDNISOLONE ACETATE 80 MG: 80 INJECTION, SUSPENSION INTRA-ARTICULAR; INTRALESIONAL; INTRAMUSCULAR; SOFT TISSUE at 21:38

## 2022-02-09 NOTE — ED NOTES
MEDICAL SCREENING:    Reason for Visit: Left upper leg swelling x several days. No recent injury.     Patient initially seen in triage.  The patient was advised further evaluation and diagnostic testing will be needed, some of the treatment and testing will be initiated in the lobby in order to begin the process.  The patient will be returned to the waiting area for the time being and possibly be re-assessed by a subsequent ED provider.  The patient will be brought back to the treatment area in as timely manner as possible.       Tiago Loyola PA-C  02/09/22 [id number]

## 2022-02-10 NOTE — DISCHARGE INSTRUCTIONS
Call the attached number first available business hours tomorrow to schedule an appointment with orthopedic Dr. Gonzalez or his team.  Take the steroid package as prescribed unless otherwise told by Dr. Gonzalez.    Denies a day to return for new onset or worsening symptoms.

## 2022-02-10 NOTE — ED PROVIDER NOTES
"  [facility]  emergency department encounter        Pt Name: Shaun Morales  MRN: [id number]  Birthdate 1970  Date of evaluation: 2/9/2022    Chief Complaint   Patient presents with   • Leg Pain             HISTORY OF PRESENT ILLNESS:   Shaun Morales is a 51 y.o. female PMH HTN, HLD, asthma, GERD, IBS, MVR, arthritis on methotrexate.  Patient presents for pain in the left popliteal fossa.  Symptom onset 5 days ago, progressively worsening.  Endorses diffuse swelling around the left knee.  Denies systemic complaints.  Pain is like a \"toothache\".  Symptoms constant, progressively worsening.      No other exacerbating or alleviating factors other than as noted above.  Severity: Severe    PCP: Li Talbot APRN          REVIEW OF SYSTEMS:     Review of Systems   Constitutional: Negative for fever.   HENT: Negative for congestion and rhinorrhea.    Eyes: Negative for visual disturbance.   Respiratory: Negative for cough and shortness of breath.    Cardiovascular: Negative for chest pain.   Gastrointestinal: Negative for abdominal pain, nausea and vomiting.   Genitourinary: Negative for dysuria.   Musculoskeletal: Negative for myalgias.        Pain posterior to left knee   Skin: Negative for rash.   Neurological: Negative for headaches.   Psychiatric/Behavioral: Negative for confusion.       Review of systems otherwise as per HPI.          PREVIOUS HISTORY:         Past Medical History:   Diagnosis Date   • Anxiety    • Asthma     mild   • Back pain    • Depression    • Elevated cholesterol    • Environmental allergies     pollen, pollution   • Family history of pseudocholinesterase deficiency     sister, dad had it.  Unsure if she has it, but reports has never had succinylcholine   • Fatigue    • GERD (gastroesophageal reflux disease)     rarely, associated with onions and red sauces, avoids both.  PRN Tums, EGD with Dr. Rich 2017, op report reviewed, no HH. urease neg. serum h. pyl neg   • H. " "pylori infection     symptoms of abodminal pain/dyspepsia, tx   • History of MRSA infection     UTI WITH REPORRTED 3-4 CLEAN CATCH WWITH NO mrSA    • Hyperlipidemia    • Hypertension    • IBS (irritable bowel syndrome)     constipation   • Interstitial cystitis     recent procedure to \"stretch\" the bladder, feels better; has pain in bladder as primary symptom, dyspareunia   • Mitral valve regurgitation     better now, has no symptoms, + hear murmur   • Mood disorder (CMS/HCC)     no formal dx of bipolar disorder, but after bad divorce took lithium   • Nausea     car sickness, has PRN Zofran, motion sickness, previously took meclizine   • Peripheral edema    • PONV (postoperative nausea and vomiting)    • Prediabetes    • Psoriasis    • Psoriatic arthritis (CMS/[facility])    • Sleep apnea     no longer have since sleeve   • Trauma of chest      moving trailer, fell on her, several cracked ribs on left, feels it caused her umbo hernia recurrence.  had to be dug out.  Exhusband fx pelvis, mult surgeries.   • Vitamin D deficiency            Past Surgical History:   Procedure Laterality Date   • APPENDECTOMY     • CARDIAC CATHETERIZATION      done after discovery of MVP, also had a CHERISE, normal per patient   •  SECTION     • COLONOSCOPY     • CYSTOSCOPY BLADDER HYDRODISTENSION N/A 2017    Procedure: CYSTOSCOPY BLADDER HYDRODISTENSION;  Surgeon: Ion Herbert MD;  [facility]:  [facility];  Service:    • DIAGNOSTIC LAPAROSCOPY N/A 10/14/2016    Procedure: DIAGNOSTIC LAPAROSCOPY POSSIBLE RIGHT SALPINGOOPHORECTOMY;  Surgeon: Rory Owens DO;  [facility]:  [facility];  Service:    • ENDOSCOPY      Dr. Rich   • ENDOSCOPY N/A 2018    Procedure: ESOPHAGOGASTRODUODENOSCOPY;  Surgeon: Aneesh Mckeon MD;  Location:  [facility];  Service:    • FOOT SURGERY     • GASTRECTOMY     • GASTRIC SLEEVE LAPAROSCOPIC N/A 2018    Procedure: GASTRIC SLEEVE LAPAROSCOPIC;  Surgeon: Aneesh" Jorge L Mckeon MD;  Location:  [facility];  Service:    • HYSTERECTOMY  ,     laparoscopy supra-cervical hysterectomy 2016 cervix and 1 ovary removed.  Remaining ovary has a cyst. initially done for pelvic pain/fibroids   • LAPAROSCOPIC APPENDECTOMY     • PANNICULECTOMY N/A 2020    Procedure: PANNICULECTOMY;  Surgeon: Kelsi Estrada MD;  [facility]:  [facility];  Service: General;  Laterality: N/A;   • PARAESOPHAGEAL HERNIA REPAIR N/A 2018    Procedure: PARAESOPHAGEAL HERNIA REPAIR LAPAROSCOPIC;  Surgeon: Aneesh Mckeon MD;  Location:  [facility];  Service:    • SKIN BIOPSY     • TRACHELECTOMY N/A 10/14/2016    Procedure: TRACHELECTOMY VAGINAL;  Surgeon: Rory Owens DO;  [facility]:  [facility];  Service:    • TUBAL ABDOMINAL LIGATION      LEFT OVARY REMAINS   • UMBILICAL HERNIA REPAIR N/A 10/14/2016    Procedure: UMBILICAL HERNIA REPAIR;  Surgeon: Spike Porter MD;  [facility]  [facility];  Service:    • UMBILICAL HERNIA REPAIR N/A 2016    Procedure: UMBILICAL HERNIA REPAIR;  Surgeon: Spike Porter MD;  [facility]:  [facility];  with mesh, supraumbilical   • WISDOM TOOTH EXTRACTION             Social History     Socioeconomic History   • Marital status:    Tobacco Use   • Smoking status: Former Smoker     Packs/day: 0.50     Years: 10.00     Pack years: 5.00     Types: Cigarettes     Quit date: 2013     Years since quittin.1   • Smokeless tobacco: Never Used   • Tobacco comment: Is around secondhand smoke occasionally in car but not in house.  smokes - not in house or cars   Substance and Sexual Activity   • Alcohol use: No     Comment: on occassion socially   • Drug use: No   • Sexual activity: Defer     Birth control/protection: Surgical           Family History   Problem Relation Age of Onset   • Diabetes Mother    • Hypertension Mother    • Stroke Mother    • Heart disease Mother    • Cancer Father    • Sleep apnea Father    • Diabetes Father    •  Anesthesia problems Sister    • Heart attack Maternal Grandmother 70   • Obesity Maternal Grandmother    • Hypertension Maternal Grandmother    • Heart disease Maternal Grandmother    • Sleep apnea Maternal Grandmother    • Stroke Maternal Grandmother    • Heart attack Maternal Grandfather    • Heart disease Maternal Grandfather    • Heart attack Paternal Grandmother 54   • Sleep apnea Paternal Grandmother    • Heart disease Paternal Grandmother    • Hypertension Paternal Grandmother    • Obesity Paternal Grandmother    • Rheum arthritis Neg Hx    • Osteoarthritis Neg Hx    • Asthma Neg Hx    • Heart failure Neg Hx    • Hyperlipidemia Neg Hx    • Migraines Neg Hx    • Rashes / Skin problems Neg Hx    • Seizures Neg Hx    • Thyroid disease Neg Hx    • Breast cancer Neg Hx          Current Outpatient Medications   Medication Instructions   • albuterol sulfate  (90 Base) MCG/ACT inhaler 2 puffs, Inhalation, Every 4 Hours PRN   • docusate sodium 100 mg, Oral, 2 Times Daily   • HYDROcodone-acetaminophen (Norco) 7.5-325 MG per tablet 1 tablet, Oral, 4 Times Daily PRN   • lisinopril (PRINIVIL,ZESTRIL) 20 mg, Oral, Daily   • methylPREDNISolone (MEDROL) 4 MG dose pack Take as directed on package instructions.   • omeprazole (PRILOSEC) 40 mg, Oral, 2 times daily   • vitamin D (ERGOCALCIFEROL) 50,000 Units, Oral, Weekly, Prior to [facility] [place] Admission, Patient was on: takes on wednesdays         Allergies:  Amlodipine, Nifedipine, Morphine and related, Adhesive tape, Chlorhexidine, Diazepam, Midazolam, Nsaids, Other, and Sulfa antibiotics    Medications, allergies and past medical, surgical, family, and social history reviewed.        PHYSICAL EXAM:     Physical Exam  Vitals and nursing note reviewed.   Constitutional:       General: She is not in acute distress.  HENT:      Head: Normocephalic and atraumatic.   Eyes:      Extraocular Movements: Extraocular movements intact.      Conjunctiva/sclera: Conjunctivae normal.    Pulmonary:      Effort: Pulmonary effort is normal. No respiratory distress.   Abdominal:      General: Abdomen is flat. There is no distension.   Musculoskeletal:         General: No deformity. Normal range of motion.      Cervical back: Normal range of motion. No rigidity.      Comments: Full active range of motion of left knee.  Notable diffuse circumferential swelling.  Tenderness to palpation with noted mass in the left popliteal fossa.  No surrounding erythema, mild warmth relative to the right knee.   Neurological:      General: No focal deficit present.      Mental Status: She is alert and oriented to person, place, and time.   Psychiatric:         Mood and Affect: Mood normal.         Behavior: Behavior normal.             COMPLETED DIAGNOSTIC STUDIES AND INTERVENTIONS:     Lab Results (last 24 hours)     Procedure Component Value Units Date/Time    CBC & Differential [[id number]]  (Abnormal) Collected: 02/09/22 1928    Specimen: Blood from Arm, Right Updated: 02/09/22 1955    Narrative:      The following orders were created for panel order CBC & Differential.  Procedure                               Abnormality         Status                     ---------                               -----------         ------                     CBC Auto Differential[[id number]]        Abnormal            Final result                 Please view results for these tests on the individual orders.    Comprehensive Metabolic Panel [[id number]]  (Abnormal) Collected: 02/09/22 1928    Specimen: Blood from Arm, Right Updated: 02/09/22 2007     Glucose 104 mg/dL      BUN 21 mg/dL      Creatinine 0.63 mg/dL      Sodium 136 mmol/L      Potassium 3.9 mmol/L      Chloride 103 mmol/L      CO2 23.1 mmol/L      Calcium 8.8 mg/dL      Total Protein 7.6 g/dL      Albumin 4.14 g/dL      ALT (SGPT) 18 U/L      AST (SGOT) 17 U/L      Alkaline Phosphatase 102 U/L      Total Bilirubin 0.2 mg/dL      eGFR Non African Amer 100 mL/min/1.73       Globulin 3.5 gm/dL      A/G Ratio 1.2 g/dL      BUN/Creatinine Ratio 33.3     Anion Gap 9.9 mmol/L     Narrative:      GFR Normal >60  Chronic Kidney Disease <60  Kidney Failure <15      C-reactive Protein [[id number]]  (Normal) Collected: 02/09/22 1928    Specimen: Blood from Arm, Right Updated: 02/09/22 2007     C-Reactive Protein 0.32 mg/dL     Sedimentation Rate [[id number]]  (Normal) Collected: 02/09/22 1928    Specimen: Blood from Arm, Right Updated: 02/09/22 1936     Sed Rate 19 mm/hr     CBC Auto Differential [[id number]]  (Abnormal) Collected: 02/09/22 1928    Specimen: Blood from Arm, Right Updated: 02/09/22 1955     WBC 8.36 10*3/mm3      RBC 4.46 10*6/mm3      Hemoglobin 13.2 g/dL      Hematocrit 40.9 %      MCV 91.7 fL      MCH 29.6 pg      MCHC 32.3 g/dL      RDW 13.4 %      RDW-SD 45.5 fl      MPV 11.0 fL      Platelets 366 10*3/mm3      Neutrophil % 48.4 %      Lymphocyte % 37.4 %      Monocyte % 7.5 %      Eosinophil % 6.0 %      Basophil % 0.5 %      Immature Grans % 0.2 %      Neutrophils, Absolute 4.04 10*3/mm3      Lymphocytes, Absolute 3.13 10*3/mm3      Monocytes, Absolute 0.63 10*3/mm3      Eosinophils, Absolute 0.50 10*3/mm3      Basophils, Absolute 0.04 10*3/mm3      Immature Grans, Absolute 0.02 10*3/mm3      nRBC 0.0 /100 WBC     Urinalysis With Microscopic If Indicated (No Culture) - Urine, Clean Catch [[id number]]  (Abnormal) Collected: 02/09/22 1930    Specimen: Urine, Clean Catch Updated: 02/09/22 1958     Color, UA Yellow     Appearance, UA Clear     pH, UA 6.5     Specific Gravity, UA >1.030     Glucose, UA Negative     Ketones, UA Negative     Bilirubin, UA Negative     Blood, UA Negative     Protein, UA Negative     Leuk Esterase, UA Negative     Nitrite, UA Negative     Urobilinogen, UA 1.0 E.U./dL    Narrative:      Urine microscopic not indicated.            US Venous Doppler Lower Extremity Left (duplex)   Final Result   No deep venous thrombus in the left lower extremity.       2.1 cm slightly thick-walled popliteal cyst within the right popliteal fossa. This may be an indicator of internal knee derangement.      Signer Name: TRUONG Monge MD    Signed: 2/9/2022 8:28 PM    Workstation Name: [email or web address]     [facility]            New Medications Ordered This Visit   Medications   • methylPREDNISolone acetate (DEPO-medrol) injection 80 mg   • ketorolac (TORADOL) tablet 15 mg   • HYDROcodone-acetaminophen (NORCO) 5-325 MG per tablet 1 tablet   • methylPREDNISolone (MEDROL) 4 MG dose pack     Sig: Take as directed on package instructions.     Dispense:  1 each     Refill:  0         Procedures            MEDICAL DECISION-MAKING AND ED COURSE:     ED Course as of 02/09/22 2138 Wed Feb 09, 2022 2107 MDM: 51-year-old female presents with pain in the left popliteal fossa, ultrasound noting Decker's cyst.  Discussed with orthopedics Dr. Gonzalez who recommends Medrol Dosepak and patient to follow-up with him.  He or his staff will be able to see patient tomorrow morning.  Medrol Dosepak prescribed.  Prior to discharge for tonight will administer Toradol and methylprednisolone IM.  Four hydrocodones ordered to go as patient has had difficulty sleeping secondary to pain.  Patient agreeable to plan.  All questions answered. [KP]      ED Course User Index  [KP] Sameer Yadav MD       ?      FINAL IMPRESSION:       1. Popliteal cyst, left         The complaints listed here are new problems to this examiner.      FOLLOW-UP  Sameer Gonzalez MD  [address] [place] [address] [address] [place] [address] [phone]    Schedule an appointment as soon as possible for a visit in 1 day        DISPOSITION  ED Disposition     ED Disposition Condition Comment    Discharge Stable                 This care is provided during an unprecedented national emergency due to the Novel Coronavirus (COVID-19). COVID-19 infections and transmission risks place heavy strains on healthcare resources. As  this pandemic evolves, the Hospital and providers strive to respond fluidly, to remain operational, and to provide care relative to available resources and information. Outcomes are unpredictable and treatments are without well-defined guidelines. Further, the impact of COVID-19 on all aspects of emergency care, including the impact to patients seeking care for reasons other than COVID-19, is unavoidable during this national emergency.    This note was dictated using a [id number] tool. Occasional wrong-word or 'sound-a-like' substitutions may have occurred due to the inherent limitations of voice recognition software. ?Read the chart carefully and recognize, using context, where substitutions have occurred.    Sameer Yadav MD  21:38 EST  2/9/2022             Sameer Yadav MD  02/09/22 1373

## 2022-03-11 ENCOUNTER — HOSPITAL ENCOUNTER (OUTPATIENT)
Dept: BONE DENSITY | Facility: HOSPITAL | Age: 52
Discharge: HOME | End: 2022-03-11

## 2022-03-11 ENCOUNTER — HOSPITAL ENCOUNTER (OUTPATIENT)
Dept: MAMMOGRAPHY | Facility: HOSPITAL | Age: 52
Discharge: HOME | End: 2022-03-11

## 2022-03-11 DIAGNOSIS — Z12.31 VISIT FOR SCREENING MAMMOGRAM: ICD-10-CM

## 2022-03-11 DIAGNOSIS — M81.0 AGE RELATED OSTEOPOROSIS, UNSPECIFIED PATHOLOGICAL FRACTURE PRESENCE: ICD-10-CM

## 2022-03-11 PROCEDURE — 77080 DXA BONE DENSITY AXIAL: CPT

## 2022-03-11 PROCEDURE — 77067 SCR MAMMO BI INCL CAD: CPT

## 2022-03-11 PROCEDURE — 77067 SCR MAMMO BI INCL CAD: CPT | Performed by: RADIOLOGY

## 2022-03-11 PROCEDURE — 77080 DXA BONE DENSITY AXIAL: CPT | Performed by: RADIOLOGY

## 2022-03-11 PROCEDURE — 77063 BREAST TOMOSYNTHESIS BI: CPT | Performed by: RADIOLOGY

## 2022-03-11 PROCEDURE — 77063 BREAST TOMOSYNTHESIS BI: CPT

## 2022-03-26 ENCOUNTER — APPOINTMENT (OUTPATIENT)
Dept: GENERAL RADIOLOGY | Facility: HOSPITAL | Age: 52
End: 2022-03-26

## 2022-03-26 ENCOUNTER — APPOINTMENT (OUTPATIENT)
Dept: ULTRASOUND IMAGING | Facility: HOSPITAL | Age: 52
End: 2022-03-26

## 2022-03-26 ENCOUNTER — HOSPITAL ENCOUNTER (EMERGENCY)
Facility: HOSPITAL | Age: 52
Discharge: HOME | End: 2022-03-26
Attending: EMERGENCY MEDICINE | Admitting: EMERGENCY MEDICINE

## 2022-03-26 VITALS
OXYGEN SATURATION: 93 % | BODY MASS INDEX: 36.62 KG/M2 | WEIGHT: 199 LBS | HEART RATE: 68 BPM | TEMPERATURE: 97.4 F | SYSTOLIC BLOOD PRESSURE: 153 MMHG | RESPIRATION RATE: 16 BRPM | HEIGHT: 62 IN | DIASTOLIC BLOOD PRESSURE: 98 MMHG

## 2022-03-26 DIAGNOSIS — M79.605 LEFT LEG PAIN: ICD-10-CM

## 2022-03-26 DIAGNOSIS — M25.562 ACUTE PAIN OF LEFT KNEE: Primary | ICD-10-CM

## 2022-03-26 PROCEDURE — 63710000001 ONDANSETRON ODT 4 MG TABLET DISPERSIBLE: Performed by: NURSE PRACTITIONER

## 2022-03-26 PROCEDURE — 93971 EXTREMITY STUDY: CPT | Performed by: RADIOLOGY

## 2022-03-26 PROCEDURE — 73590 X-RAY EXAM OF LOWER LEG: CPT | Performed by: RADIOLOGY

## 2022-03-26 PROCEDURE — 73564 X-RAY EXAM KNEE 4 OR MORE: CPT

## 2022-03-26 PROCEDURE — 93971 EXTREMITY STUDY: CPT

## 2022-03-26 PROCEDURE — 73564 X-RAY EXAM KNEE 4 OR MORE: CPT | Performed by: RADIOLOGY

## 2022-03-26 PROCEDURE — 96372 THER/PROPH/DIAG INJ SC/IM: CPT

## 2022-03-26 PROCEDURE — 25010000002 HYDROMORPHONE PER 4 MG: Performed by: NURSE PRACTITIONER

## 2022-03-26 PROCEDURE — 73590 X-RAY EXAM OF LOWER LEG: CPT

## 2022-03-26 PROCEDURE — 99283 EMERGENCY DEPT VISIT LOW MDM: CPT

## 2022-03-26 RX ORDER — HYDROMORPHONE HYDROCHLORIDE 1 MG/ML
0.5 INJECTION, SOLUTION INTRAMUSCULAR; INTRAVENOUS; SUBCUTANEOUS ONCE
Status: COMPLETED | OUTPATIENT
Start: 2022-03-26 | End: 2022-03-26

## 2022-03-26 RX ORDER — HYDROCODONE BITARTRATE AND ACETAMINOPHEN 5; 325 MG/1; MG/1
1 TABLET ORAL EVERY 6 HOURS PRN
Qty: 12 TABLET | Refills: 0 | Status: SHIPPED | OUTPATIENT
Start: 2022-03-26 | End: 2023-03-30

## 2022-03-26 RX ORDER — ONDANSETRON 4 MG/1
4 TABLET, ORALLY DISINTEGRATING ORAL ONCE
Status: COMPLETED | OUTPATIENT
Start: 2022-03-26 | End: 2022-03-26

## 2022-03-26 RX ADMIN — HYDROMORPHONE HYDROCHLORIDE 0.5 MG: 1 INJECTION, SOLUTION INTRAMUSCULAR; INTRAVENOUS; SUBCUTANEOUS at 10:49

## 2022-03-26 RX ADMIN — ONDANSETRON 4 MG: 4 TABLET, ORALLY DISINTEGRATING ORAL at 10:49

## 2022-03-26 NOTE — ED PROVIDER NOTES
Subjective     Leg Pain  Location:  Knee  Time since incident:  2 days  Injury: no    Knee location:  L knee  Pain details:     Quality:  Shooting    Radiates to:  Does not radiate    Severity:  Moderate    Onset quality:  Gradual    Duration:  3 days    Timing:  Constant    Progression:  Waxing and waning  Chronicity:  New  Dislocation: no    Foreign body present:  No foreign bodies  Tetanus status:  Up to date  Prior injury to area:  No  Relieved by:  Nothing  Worsened by:  Nothing  Ineffective treatments:  None tried  Associated symptoms: no back pain, no decreased ROM, no fatigue, no fever and no muscle weakness    Risk factors: no concern for non-accidental trauma, no frequent fractures and no recent illness        Review of Systems   Constitutional: Negative.  Negative for fatigue and fever.   HENT: Negative.    Eyes: Negative.    Respiratory: Negative.    Cardiovascular: Negative.    Gastrointestinal: Negative.    Endocrine: Negative.    Genitourinary: Negative.    Musculoskeletal: Negative.  Negative for back pain.   Skin: Negative.    Allergic/Immunologic: Negative.    Neurological: Negative.    Hematological: Negative.    Psychiatric/Behavioral: Negative.        Past Medical History:   Diagnosis Date   • Anxiety    • Asthma     mild   • Back pain    • Depression    • Elevated cholesterol    • Environmental allergies     pollen, pollution   • Family history of pseudocholinesterase deficiency     sister, dad had it.  Unsure if she has it, but reports has never had succinylcholine   • Fatigue    • GERD (gastroesophageal reflux disease)     rarely, associated with onions and red sauces, avoids both.  PRN Tums, EGD with Dr. Rich 2017, op report reviewed, no HH. urease neg. serum h. pyl neg   • H. pylori infection     symptoms of abodminal pain/dyspepsia, tx   • History of MRSA infection 2012    UTI WITH REPORRTED 3-4 CLEAN CATCH WWITH NO mrSA    • Hyperlipidemia    • Hypertension    • IBS (irritable bowel  "syndrome)     constipation   • Interstitial cystitis     recent procedure to \"stretch\" the bladder, feels better; has pain in bladder as primary symptom, dyspareunia   • Mitral valve regurgitation     better now, has no symptoms, + hear murmur   • Mood disorder (CMS/HCC)     no formal dx of bipolar disorder, but after bad divorce took lithium   • Nausea     car sickness, has PRN Zofran, motion sickness, previously took meclizine   • Peripheral edema    • PONV (postoperative nausea and vomiting)    • Prediabetes    • Psoriasis    • Psoriatic arthritis (CMS/HCC)    • Sleep apnea     no longer have since sleeve   • Trauma of chest      moving trailer, fell on her, several cracked ribs on left, feels it caused her umbo hernia recurrence.  had to be dug out.  Exhusband fx pelvis, mult surgeries.   • Vitamin D deficiency        Allergies   Allergen Reactions   • Amlodipine GI Intolerance and Arrhythmia   • Nifedipine GI Intolerance and Arrhythmia     Adalat, procardia   • Morphine And Related Itching     Dilaudid ok, percocet/lortab ok   • Adhesive Tape Rash     Can tolerate steristrips and skin glue   • Chlorhexidine Rash   • Diazepam Anxiety     \"reverse effect,\" \"makes me absolutely crazy\"   • Midazolam Anxiety   • Nsaids Unknown (See Comments)     Pt states she cannot take NSAIDs for a peroid of time after having her Gastric Sleeve Surgery    • Other Other (See Comments)     Pt states she cannot taken Steroids for some time after Gastric sleeve operation    • Sulfa Antibiotics Rash       Past Surgical History:   Procedure Laterality Date   • APPENDECTOMY     • CARDIAC CATHETERIZATION      done after discovery of MVP, also had a CHERISE, normal per patient   •  SECTION     • COLONOSCOPY     • CYSTOSCOPY BLADDER HYDRODISTENSION N/A 2017    Procedure: CYSTOSCOPY BLADDER HYDRODISTENSION;  Surgeon: Ion Herbert MD;  Location: [facility];  Service:    • DIAGNOSTIC LAPAROSCOPY N/A 10/14/2016 "    Procedure: DIAGNOSTIC LAPAROSCOPY POSSIBLE RIGHT SALPINGOOPHORECTOMY;  Surgeon: Rory Owens DO;  [facility];  Service:    • ENDOSCOPY  2017    Dr. Rich   • ENDOSCOPY N/A 2/23/2018    Procedure: ESOPHAGOGASTRODUODENOSCOPY;  Surgeon: Aneesh Mckeon MD;  [facility]:  [facility];  Service:    • FOOT SURGERY  1984   • GASTRECTOMY     • GASTRIC SLEEVE LAPAROSCOPIC N/A 2/23/2018    Procedure: GASTRIC SLEEVE LAPAROSCOPIC;  Surgeon: Aneesh Mckeon MD;  Location:  [facility];  Service:    • HYSTERECTOMY  2008, 2016    laparoscopy supra-cervical hysterectomy 2008, 2016 cervix and 1 ovary removed.  Remaining ovary has a cyst. initially done for pelvic pain/fibroids   • LAPAROSCOPIC APPENDECTOMY  2015   • PANNICULECTOMY N/A 9/2/2020    Procedure: PANNICULECTOMY;  Surgeon: Kelsi Estrada MD;  [facility];  Service: General;  Laterality: N/A;   • PARAESOPHAGEAL HERNIA REPAIR N/A 2/23/2018    Procedure: PARAESOPHAGEAL HERNIA REPAIR LAPAROSCOPIC;  Surgeon: Aneesh Mckeon MD;  Location:  [facility];  Service:    • SKIN BIOPSY     • TRACHELECTOMY N/A 10/14/2016    Procedure: TRACHELECTOMY VAGINAL;  Surgeon: Rory Owens DO;  [facility];  Service:    • TUBAL ABDOMINAL LIGATION  2000    LEFT OVARY REMAINS   • UMBILICAL HERNIA REPAIR N/A 10/14/2016    Procedure: UMBILICAL HERNIA REPAIR;  Surgeon: Spike Porter MD;  [facility];  Service:    • UMBILICAL HERNIA REPAIR N/A 12/9/2016    Procedure: UMBILICAL HERNIA REPAIR;  Surgeon: Spike Porter MD;  [facility];  with mesh, supraumbilical   • WISDOM TOOTH EXTRACTION  2000       Family History   Problem Relation Age of Onset   • Diabetes Mother    • Hypertension Mother    • Stroke Mother    • Heart disease Mother    • Cancer Father    • Sleep apnea Father    • Diabetes Father    • Anesthesia problems Sister    • Heart attack Maternal Grandmother 70   • Obesity Maternal Grandmother    • Hypertension Maternal  Grandmother    • Heart disease Maternal Grandmother    • Sleep apnea Maternal Grandmother    • Stroke Maternal Grandmother    • Heart attack Maternal Grandfather    • Heart disease Maternal Grandfather    • Heart attack Paternal Grandmother 54   • Sleep apnea Paternal Grandmother    • Heart disease Paternal Grandmother    • Hypertension Paternal Grandmother    • Obesity Paternal Grandmother    • Rheum arthritis Neg Hx    • Osteoarthritis Neg Hx    • Asthma Neg Hx    • Heart failure Neg Hx    • Hyperlipidemia Neg Hx    • Migraines Neg Hx    • Rashes / Skin problems Neg Hx    • Seizures Neg Hx    • Thyroid disease Neg Hx    • Breast cancer Neg Hx        Social History     Socioeconomic History   • Marital status:    Tobacco Use   • Smoking status: Former Smoker     Packs/day: 0.50     Years: 10.00     Pack years: 5.00     Types: Cigarettes     Quit date: 2013     Years since quittin.2   • Smokeless tobacco: Never Used   • Tobacco comment: Is around secondhand smoke occasionally in car but not in house.  smokes - not in house or cars   Substance and Sexual Activity   • Alcohol use: No     Comment: on occassion socially   • Drug use: No   • Sexual activity: Defer     Birth control/protection: Surgical           Objective   Physical Exam  Vitals and nursing note reviewed.   Constitutional:       Appearance: She is well-developed.   HENT:      Head: Normocephalic.      Right Ear: External ear normal.      Left Ear: External ear normal.   Eyes:      Conjunctiva/sclera: Conjunctivae normal.      Pupils: Pupils are equal, round, and reactive to light.   Cardiovascular:      Rate and Rhythm: Normal rate and regular rhythm.      Heart sounds: Normal heart sounds.   Pulmonary:      Effort: Pulmonary effort is normal.      Breath sounds: Normal breath sounds.   Abdominal:      General: Bowel sounds are normal.      Palpations: Abdomen is soft.   Musculoskeletal:         General: Normal range of motion.       Cervical back: Normal range of motion and neck supple.      Comments: Tenderness/pain left knee     Skin:     General: Skin is warm and dry.      Capillary Refill: Capillary refill takes less than 2 seconds.   Neurological:      Mental Status: She is alert and oriented to person, place, and time.   Psychiatric:         Behavior: Behavior normal.         Thought Content: Thought content normal.         Procedures           ED Course                                                 MDM    Final diagnoses:   Acute pain of left knee   Left leg pain       ED Disposition  ED Disposition     ED Disposition   Discharge    Condition   Stable    Comment   --             Mati Morfin MD  [address] [place] [address] [address] [place] [address] [phone]    Schedule an appointment as soon as possible for a visit   For further evaluation         Medication List      Changed    * HYDROcodone-acetaminophen 7.5-325 MG per tablet  Commonly known as: Norco  Take 1 tablet by mouth 4 (Four) Times a Day As Needed for Moderate Pain .  What changed: Another medication with the same name was added. Make sure you understand how and when to take each.     * HYDROcodone-acetaminophen 5-325 MG per tablet  Commonly known as: NORCO  Take 1 tablet by mouth Every 6 (Six) Hours As Needed for Moderate Pain .  What changed: You were already taking a medication with the same name, and this prescription was added. Make sure you understand how and when to take each.         * This list has 2 medication(s) that are the same as other medications prescribed for you. Read the directions carefully, and ask your doctor or other care provider to review them with you.               Where to Get Your Medications      [facility] [place] [place] [facility] [address] - [phone] [facility] [phone]   [address] [facility] [place] [address]    Phone: [phone]   · HYDROcodone-acetaminophen  5-325 MG per tablet          Venkat Okeefe, APRN  03/27/22 0115

## 2022-03-31 ENCOUNTER — TRANSCRIBE ORDERS (OUTPATIENT)
Dept: ADMINISTRATIVE | Facility: HOSPITAL | Age: 52
End: 2022-03-31

## 2022-03-31 DIAGNOSIS — M25.562 LEFT KNEE PAIN, UNSPECIFIED CHRONICITY: Primary | ICD-10-CM

## 2022-04-22 ENCOUNTER — HOSPITAL ENCOUNTER (OUTPATIENT)
Dept: MRI IMAGING | Facility: HOSPITAL | Age: 52
Discharge: HOME | End: 2022-04-22
Admitting: NURSE PRACTITIONER

## 2022-04-22 DIAGNOSIS — M25.562 LEFT KNEE PAIN, UNSPECIFIED CHRONICITY: ICD-10-CM

## 2022-04-22 PROCEDURE — 73721 MRI JNT OF LWR EXTRE W/O DYE: CPT

## 2022-04-22 PROCEDURE — 73721 MRI JNT OF LWR EXTRE W/O DYE: CPT | Performed by: RADIOLOGY

## 2022-11-30 ENCOUNTER — HOSPITAL ENCOUNTER (EMERGENCY)
Facility: HOSPITAL | Age: 52
Discharge: HOME | End: 2022-11-30
Attending: EMERGENCY MEDICINE | Admitting: STUDENT IN AN ORGANIZED HEALTH CARE EDUCATION/TRAINING PROGRAM

## 2022-11-30 ENCOUNTER — APPOINTMENT (OUTPATIENT)
Dept: GENERAL RADIOLOGY | Facility: HOSPITAL | Age: 52
End: 2022-11-30

## 2022-11-30 VITALS
DIASTOLIC BLOOD PRESSURE: 78 MMHG | WEIGHT: 208 LBS | RESPIRATION RATE: 18 BRPM | TEMPERATURE: 97.1 F | SYSTOLIC BLOOD PRESSURE: 145 MMHG | HEART RATE: 60 BPM | BODY MASS INDEX: 38.28 KG/M2 | HEIGHT: 62 IN | OXYGEN SATURATION: 97 %

## 2022-11-30 DIAGNOSIS — S90.851A FOREIGN BODY IN RIGHT FOOT, INITIAL ENCOUNTER: Primary | ICD-10-CM

## 2022-11-30 PROCEDURE — 99283 EMERGENCY DEPT VISIT LOW MDM: CPT

## 2022-11-30 PROCEDURE — 73630 X-RAY EXAM OF FOOT: CPT

## 2022-11-30 RX ORDER — LIDOCAINE HYDROCHLORIDE 10 MG/ML
5 INJECTION, SOLUTION EPIDURAL; INFILTRATION; INTRACAUDAL; PERINEURAL ONCE
Status: COMPLETED | OUTPATIENT
Start: 2022-11-30 | End: 2022-11-30

## 2022-11-30 RX ADMIN — LIDOCAINE HYDROCHLORIDE 5 ML: 10 INJECTION, SOLUTION EPIDURAL; INFILTRATION; INTRACAUDAL; PERINEURAL at 07:18

## 2023-02-19 ENCOUNTER — HOSPITAL ENCOUNTER (EMERGENCY)
Facility: HOSPITAL | Age: 53
Discharge: HOME | End: 2023-02-20
Attending: EMERGENCY MEDICINE | Admitting: EMERGENCY MEDICINE
Payer: COMMERCIAL

## 2023-02-19 DIAGNOSIS — K56.7 ILEUS: Primary | ICD-10-CM

## 2023-02-19 LAB
ALBUMIN SERPL-MCNC: 3.9 G/DL (ref 3.5–5.2)
ALBUMIN/GLOB SERPL: 1.1 G/DL
ALP SERPL-CCNC: 89 U/L (ref 39–117)
ALT SERPL W P-5'-P-CCNC: 22 U/L (ref 1–33)
ANION GAP SERPL CALCULATED.3IONS-SCNC: 11 MMOL/L (ref 5–15)
AST SERPL-CCNC: 22 U/L (ref 1–32)
BILIRUB SERPL-MCNC: 0.2 MG/DL (ref 0–1.2)
BUN SERPL-MCNC: 10 MG/DL (ref 6–20)
BUN/CREAT SERPL: 18.5 (ref 7–25)
CALCIUM SPEC-SCNC: 9 MG/DL (ref 8.6–10.5)
CHLORIDE SERPL-SCNC: 105 MMOL/L (ref 98–107)
CO2 SERPL-SCNC: 23 MMOL/L (ref 22–29)
CREAT SERPL-MCNC: 0.54 MG/DL (ref 0.57–1)
CRP SERPL-MCNC: <0.3 MG/DL (ref 0–0.5)
DEPRECATED RDW RBC AUTO: 44.2 FL (ref 37–54)
EGFRCR SERPLBLD CKD-EPI 2021: 112.3 ML/MIN/1.73
EOSINOPHIL # BLD MANUAL: 0.15 10*3/MM3 (ref 0–0.4)
EOSINOPHIL NFR BLD MANUAL: 3 % (ref 0.3–6.2)
ERYTHROCYTE [DISTWIDTH] IN BLOOD BY AUTOMATED COUNT: 13.4 % (ref 12.3–15.4)
FLUAV RNA RESP QL NAA+PROBE: NOT DETECTED
FLUBV RNA RESP QL NAA+PROBE: NOT DETECTED
GLOBULIN UR ELPH-MCNC: 3.4 GM/DL
GLUCOSE SERPL-MCNC: 108 MG/DL (ref 65–99)
HCT VFR BLD AUTO: 42.1 % (ref 34–46.6)
HGB BLD-MCNC: 14.1 G/DL (ref 12–15.9)
LIPASE SERPL-CCNC: 27 U/L (ref 13–60)
LYMPHOCYTES # BLD MANUAL: 1.84 10*3/MM3 (ref 0.7–3.1)
LYMPHOCYTES NFR BLD MANUAL: 13 % (ref 5–12)
MAGNESIUM SERPL-MCNC: 2.1 MG/DL (ref 1.6–2.6)
MCH RBC QN AUTO: 30.1 PG (ref 26.6–33)
MCHC RBC AUTO-ENTMCNC: 33.5 G/DL (ref 31.5–35.7)
MCV RBC AUTO: 90 FL (ref 79–97)
MONOCYTES # BLD: 0.65 10*3/MM3 (ref 0.1–0.9)
NEUTROPHILS # BLD AUTO: 2.34 10*3/MM3 (ref 1.7–7)
NEUTROPHILS NFR BLD MANUAL: 41 % (ref 42.7–76)
NEUTS BAND NFR BLD MANUAL: 6 % (ref 0–5)
PLAT MORPH BLD: NORMAL
PLATELET # BLD AUTO: 321 10*3/MM3 (ref 140–450)
PMV BLD AUTO: 10.5 FL (ref 6–12)
POTASSIUM SERPL-SCNC: 3.6 MMOL/L (ref 3.5–5.2)
PROT SERPL-MCNC: 7.3 G/DL (ref 6–8.5)
RBC # BLD AUTO: 4.68 10*6/MM3 (ref 3.77–5.28)
RBC MORPH BLD: NORMAL
SARS-COV-2 RNA RESP QL NAA+PROBE: NOT DETECTED
SCAN SLIDE: NORMAL
SODIUM SERPL-SCNC: 139 MMOL/L (ref 136–145)
VARIANT LYMPHS NFR BLD MANUAL: 37 % (ref 19.6–45.3)
WBC NRBC COR # BLD: 4.98 10*3/MM3 (ref 3.4–10.8)

## 2023-02-19 PROCEDURE — 25010000002 ONDANSETRON PER 1 MG: Performed by: PHYSICIAN ASSISTANT

## 2023-02-19 PROCEDURE — 99283 EMERGENCY DEPT VISIT LOW MDM: CPT

## 2023-02-19 PROCEDURE — 83735 ASSAY OF MAGNESIUM: CPT | Performed by: PHYSICIAN ASSISTANT

## 2023-02-19 PROCEDURE — 83690 ASSAY OF LIPASE: CPT | Performed by: PHYSICIAN ASSISTANT

## 2023-02-19 PROCEDURE — 85025 COMPLETE CBC W/AUTO DIFF WBC: CPT | Performed by: PHYSICIAN ASSISTANT

## 2023-02-19 PROCEDURE — 96374 THER/PROPH/DIAG INJ IV PUSH: CPT

## 2023-02-19 PROCEDURE — 85007 BL SMEAR W/DIFF WBC COUNT: CPT | Performed by: PHYSICIAN ASSISTANT

## 2023-02-19 PROCEDURE — 36415 COLL VENOUS BLD VENIPUNCTURE: CPT

## 2023-02-19 PROCEDURE — 80053 COMPREHEN METABOLIC PANEL: CPT | Performed by: PHYSICIAN ASSISTANT

## 2023-02-19 PROCEDURE — 87636 SARSCOV2 & INF A&B AMP PRB: CPT | Performed by: PHYSICIAN ASSISTANT

## 2023-02-19 PROCEDURE — 86140 C-REACTIVE PROTEIN: CPT | Performed by: PHYSICIAN ASSISTANT

## 2023-02-19 RX ORDER — SODIUM CHLORIDE 0.9 % (FLUSH) 0.9 %
10 SYRINGE (ML) INJECTION AS NEEDED
Status: DISCONTINUED | OUTPATIENT
Start: 2023-02-19 | End: 2023-02-20 | Stop reason: HOSPADM

## 2023-02-19 RX ORDER — ONDANSETRON 2 MG/ML
4 INJECTION INTRAMUSCULAR; INTRAVENOUS ONCE
Status: COMPLETED | OUTPATIENT
Start: 2023-02-19 | End: 2023-02-19

## 2023-02-19 RX ADMIN — ONDANSETRON 4 MG: 2 INJECTION INTRAMUSCULAR; INTRAVENOUS at 23:51

## 2023-02-19 RX ADMIN — SODIUM CHLORIDE 1000 ML: 9 INJECTION, SOLUTION INTRAVENOUS at 23:51

## 2023-02-20 ENCOUNTER — APPOINTMENT (OUTPATIENT)
Dept: CT IMAGING | Facility: HOSPITAL | Age: 53
End: 2023-02-20
Payer: COMMERCIAL

## 2023-02-20 VITALS
SYSTOLIC BLOOD PRESSURE: 133 MMHG | WEIGHT: 205 LBS | BODY MASS INDEX: 37.73 KG/M2 | TEMPERATURE: 98.6 F | HEIGHT: 62 IN | RESPIRATION RATE: 20 BRPM | HEART RATE: 69 BPM | OXYGEN SATURATION: 100 % | DIASTOLIC BLOOD PRESSURE: 71 MMHG

## 2023-02-20 LAB
HOLD SPECIMEN: NORMAL
HOLD SPECIMEN: NORMAL
WHOLE BLOOD HOLD COAG: NORMAL
WHOLE BLOOD HOLD SPECIMEN: NORMAL

## 2023-02-20 PROCEDURE — 25010000002 IOPAMIDOL 61 % SOLUTION: Performed by: EMERGENCY MEDICINE

## 2023-02-20 PROCEDURE — 74177 CT ABD & PELVIS W/CONTRAST: CPT

## 2023-02-20 RX ORDER — METOCLOPRAMIDE 5 MG/1
5 TABLET ORAL
Qty: 30 TABLET | Refills: 0 | Status: SHIPPED | OUTPATIENT
Start: 2023-02-20 | End: 2023-04-04

## 2023-02-20 RX ADMIN — IOPAMIDOL 89 ML: 612 INJECTION, SOLUTION INTRAVENOUS at 00:42

## 2023-02-20 NOTE — ED PROVIDER NOTES
"Subjective     History provided by:  Patient  Abdominal Pain  Pain location:  Generalized  Pain quality: aching and cramping    Pain radiates to:  Does not radiate  Pain severity:  Moderate  Onset quality:  Gradual  Timing:  Constant  Progression:  Worsening  Chronicity:  New  Relieved by:  Nothing  Associated symptoms: nausea and vomiting    Associated symptoms: no chest pain, no dysuria and no fever        Review of Systems   Constitutional: Negative.  Negative for fever.   HENT: Negative.    Respiratory: Negative.    Cardiovascular: Negative.  Negative for chest pain.   Gastrointestinal: Positive for abdominal pain, nausea and vomiting.   Endocrine: Negative.    Genitourinary: Negative.  Negative for dysuria.   Skin: Negative.    Neurological: Negative.    Psychiatric/Behavioral: Negative.    All other systems reviewed and are negative.      Past Medical History:   Diagnosis Date   • Anxiety    • Asthma     mild   • Back pain    • Depression    • Elevated cholesterol    • Environmental allergies     pollen, pollution   • Family history of pseudocholinesterase deficiency     sister, dad had it.  Unsure if she has it, but reports has never had succinylcholine   • Fatigue    • GERD (gastroesophageal reflux disease)     rarely, associated with onions and red sauces, avoids both.  PRN Tums, EGD with Dr. Rich 2017, op report reviewed, no HH. urease neg. serum h. pyl neg   • H. pylori infection     symptoms of abodminal pain/dyspepsia, tx   • History of MRSA infection 2012    UTI WITH REPORRTED 3-4 CLEAN CATCH WWITH NO mrSA    • Hyperlipidemia    • Hypertension    • IBS (irritable bowel syndrome)     constipation   • Interstitial cystitis     recent procedure to \"stretch\" the bladder, feels better; has pain in bladder as primary symptom, dyspareunia   • Mitral valve regurgitation     better now, has no symptoms, + hear murmur   • Mood disorder (CMS/[facility])     no formal dx of bipolar disorder, but after bad divorce took " "lithium   • Nausea     car sickness, has PRN Zofran, motion sickness, previously took meclizine   • Peripheral edema    • PONV (postoperative nausea and vomiting)    • Prediabetes    • Psoriasis    • Psoriatic arthritis (CMS/HCC)    • Sleep apnea     no longer have since sleeve   • Trauma of chest      moving trailer, fell on her, several cracked ribs on left, feels it caused her umbo hernia recurrence.  had to be dug out.  Exhusband fx pelvis, mult surgeries.   • Vitamin D deficiency        Allergies   Allergen Reactions   • Amlodipine GI Intolerance and Arrhythmia   • Nifedipine GI Intolerance and Arrhythmia     Adalat, procardia   • Morphine And Related Itching     Dilaudid ok, percocet/lortab ok   • Adhesive Tape Rash     Can tolerate steristrips and skin glue   • Chlorhexidine Rash   • Diazepam Anxiety     \"reverse effect,\" \"makes me absolutely crazy\"   • Midazolam Anxiety   • Nsaids Unknown (See Comments)     Pt states she cannot take NSAIDs for a peroid of time after having her Gastric Sleeve Surgery    • Other Other (See Comments)     Pt states she cannot taken Steroids for some time after Gastric sleeve operation    • Sulfa Antibiotics Rash       Past Surgical History:   Procedure Laterality Date   • APPENDECTOMY     • CARDIAC CATHETERIZATION      done after discovery of MVP, also had a CHERISE, normal per patient   •  SECTION     • COLONOSCOPY     • CYSTOSCOPY BLADDER HYDRODISTENSION N/A 2017    Procedure: CYSTOSCOPY BLADDER HYDRODISTENSION;  Surgeon: Ion Herbert MD;  [facility];  Service:    • DIAGNOSTIC LAPAROSCOPY N/A 10/14/2016    Procedure: DIAGNOSTIC LAPAROSCOPY POSSIBLE RIGHT SALPINGOOPHORECTOMY;  Surgeon: Rory Owens DO;  [facility];  Service:    • ENDOSCOPY      Dr. Rich   • ENDOSCOPY N/A 2018    Procedure: ESOPHAGOGASTRODUODENOSCOPY;  Surgeon: Aneesh Mckeon MD;  [facility]  [facility];  Service:    • FOOT SURGERY   "   • GASTRECTOMY     • GASTRIC SLEEVE LAPAROSCOPIC N/A 2/23/2018    Procedure: GASTRIC SLEEVE LAPAROSCOPIC;  Surgeon: Aneesh Mckeon MD;  Location:  [facility];  Service:    • HYSTERECTOMY  2008, 2016    laparoscopy supra-cervical hysterectomy 2008, 2016 cervix and 1 ovary removed.  Remaining ovary has a cyst. initially done for pelvic pain/fibroids   • LAPAROSCOPIC APPENDECTOMY  2015   • PANNICULECTOMY N/A 9/2/2020    Procedure: PANNICULECTOMY;  Surgeon: Kelsi Estrada MD;  [facility];  Service: General;  Laterality: N/A;   • PARAESOPHAGEAL HERNIA REPAIR N/A 2/23/2018    Procedure: PARAESOPHAGEAL HERNIA REPAIR LAPAROSCOPIC;  Surgeon: Aneesh Mckeon MD;  [facility];  Service:    • SKIN BIOPSY     • TRACHELECTOMY N/A 10/14/2016    Procedure: TRACHELECTOMY VAGINAL;  Surgeon: Rory Owens DO;  [facility];  Service:    • TUBAL ABDOMINAL LIGATION  2000    LEFT OVARY REMAINS   • UMBILICAL HERNIA REPAIR N/A 10/14/2016    Procedure: UMBILICAL HERNIA REPAIR;  Surgeon: Spike Porter MD;  [facility];  Service:    • UMBILICAL HERNIA REPAIR N/A 12/9/2016    Procedure: UMBILICAL HERNIA REPAIR;  Surgeon: Spike Porter MD;  [facility];  with mesh, supraumbilical   • WISDOM TOOTH EXTRACTION  2000       Family History   Problem Relation Age of Onset   • Diabetes Mother    • Hypertension Mother    • Stroke Mother    • Heart disease Mother    • Cancer Father    • Sleep apnea Father    • Diabetes Father    • Anesthesia problems Sister    • Heart attack Maternal Grandmother 70   • Obesity Maternal Grandmother    • Hypertension Maternal Grandmother    • Heart disease Maternal Grandmother    • Sleep apnea Maternal Grandmother    • Stroke Maternal Grandmother    • Heart attack Maternal Grandfather    • Heart disease Maternal Grandfather    • Heart attack Paternal Grandmother 54   • Sleep apnea Paternal Grandmother    • Heart disease Paternal Grandmother    • Hypertension  Paternal Grandmother    • Obesity Paternal Grandmother    • Rheum arthritis Neg Hx    • Osteoarthritis Neg Hx    • Asthma Neg Hx    • Heart failure Neg Hx    • Hyperlipidemia Neg Hx    • Migraines Neg Hx    • Rashes / Skin problems Neg Hx    • Seizures Neg Hx    • Thyroid disease Neg Hx    • Breast cancer Neg Hx        Social History     Socioeconomic History   • Marital status:    Tobacco Use   • Smoking status: Former     Packs/day: 0.50     Years: 10.00     Pack years: 5.00     Types: Cigarettes     Quit date: 1/1/2013     Years since quitting: 10.1   • Smokeless tobacco: Never   • Tobacco comments:     Is around secondhand smoke occasionally in car but not in house.  smokes - not in house or cars   Substance and Sexual Activity   • Alcohol use: No     Comment: on occassion socially   • Drug use: No   • Sexual activity: Defer     Birth control/protection: Surgical           Objective   Physical Exam  Vitals and nursing note reviewed.   Constitutional:       General: She is not in acute distress.     Appearance: She is well-developed. She is not diaphoretic.   HENT:      Head: Normocephalic and atraumatic.      Right Ear: External ear normal.      Left Ear: External ear normal.      Nose: Nose normal.   Eyes:      Conjunctiva/sclera: Conjunctivae normal.   Neck:      Vascular: No JVD.      Trachea: No tracheal deviation.   Cardiovascular:      Rate and Rhythm: Normal rate.      Heart sounds: No murmur heard.  Pulmonary:      Effort: Pulmonary effort is normal. No respiratory distress.      Breath sounds: No wheezing.   Abdominal:      Palpations: Abdomen is soft.      Tenderness: There is generalized abdominal tenderness.   Musculoskeletal:         General: No deformity. Normal range of motion.      Cervical back: Normal range of motion and neck supple.   Skin:     General: Skin is warm and dry.      Coloration: Skin is not pale.      Findings: No erythema or rash.   Neurological:      Mental Status:  She is alert and oriented to person, place, and time.      Cranial Nerves: No cranial nerve deficit.   Psychiatric:         Behavior: Behavior normal.         Thought Content: Thought content normal.         Procedures           ED Course  ED Course as of 02/20/23 0125   Mon Feb 20, 2023   0121 CT abd pelvis rad interpreted:  1. Evidence of a mild small bowel ileus. No bowel obstruction.  2. Gastric sleeve surgery, appendectomy, hysterectomy, and umbilical hernia repair.  3. Prominent small bowel mesenteric lymph nodes with some associated fat stranding, likely infectious or inflammatory. Attention on 3 month follow-up CT is recommended. There is no other adenopathy.  4. Normal nonobstructed kidneys. [RB]      ED Course User Index  [RB] Troy Okeefe II, PA                                           Medical Decision Making  52-year-old female with cute onset of abdominal pain over the last 3 days.    Ileus (HCC): acute illness or injury     Details: Work-up is unremarkable.  No surgical process identified on his CT imaging.  Lab work-up is benign.  Patient be prescribed some Reglan for symptomatic control.  Patient educated on about ileus  Amount and/or Complexity of Data Reviewed  Labs: ordered.  Radiology: ordered. Decision-making details documented in ED Course.      Risk  Prescription drug management.          Final diagnoses:   Ileus (HCC)       ED Disposition  ED Disposition     ED Disposition   Discharge    Condition   Stable    Comment   --             Virginie Blakely, APRN  [address] [facility]  [place] [address] [phone]    Schedule an appointment as soon as possible for a visit            Medication List      New Prescriptions    metoclopramide 5 MG tablet  Commonly known as: REGLAN  Take 1 tablet by mouth 4 (Four) Times a Day Before Meals & at Bedtime.           Where to Get Your Medications      These medications were sent to [facility] [id number] - [place], [place] - [address]  [address] - [phone]  - [phone] FX  [address] [place] [address]    Phone: [phone]   · metoclopramide 5 MG tablet          Troy Okeefe II, PA  02/20/23 3779

## 2023-02-20 NOTE — ED NOTES
MEDICAL SCREENING:    Reason for Visit: abd pain, NV    Patient initially seen in triage.  The patient was advised further evaluation and diagnostic testing will be needed, some of the treatment and testing will be initiated in the lobby in order to begin the process.  The patient will be returned to the waiting area for the time being and possibly be re-assessed by a subsequent ED provider.  The patient will be brought back to the treatment area in as timely manner as possible.         Troy Okeefe II, PA  02/19/23 1368

## 2023-03-22 NOTE — PROGRESS NOTES
DATE OF CONSULTATION:  3/30/2023    REASON FOR REFERRAL: Mild small bowel ileus, lower abdominal pain    REFERRING PHYSICIAN:  AMANDA Sow    CHIEF COMPLAINT:  Lower abdominal pain    HISTORY OF PRESENT ILLNESS:   Shaun Morales is a 52 y.o. female who is being seen today at the request of AMANDA Sow for evaluation and treatment of a mild small bowel ileus noted on recent CT imaging. In mid February, Ms. Morales reports having a stomach virus with symptoms of diarrhea, nausea, vomiting and abdominal pain for ~8 days. She says it took her much longer to recover from this than the rest of her family. She presented to [facility] ED as she felt like she may need IVF. At that time she underwent CT A/P which showed evidence of a mild small bowel ileus with prominent small bowel mesenteric lymph nodes with some associated fat stranding, likely infectious or inflammatory. Of note, she is s/p gastric sleeve surgery, appendectomy, hysterectomy and umbilical hernia repair. There was no surgical intervention required and she was discharged home on Reglan which she says has been very helpful. She previously struggled with IBS-C. However, since starting reglan, she reports having normal (type 4 on bristol stool scale) bowel movements daily and she is no longer having nausea. At present, she complains of lower quadrant abdominal pain. She denies having blood in her stool, melena or BRBPR. Denies unusual weight loss. She had colonoscopy with Dr. Porter in 2016 which was unremarkable. She reports taking omeprazole 40 mg PO twice daily which controls her acid reflux. She has no other complaints at this time.     PAST MEDICAL HISTORY:  Past Medical History:   Diagnosis Date   • Anxiety    • Asthma     mild   • Back pain    • Depression    • Elevated cholesterol    • Environmental allergies     pollen, pollution   • Family history of pseudocholinesterase deficiency     sister, dad had it.  Unsure if she has it, but reports has  "never had succinylcholine   • Fatigue    • GERD (gastroesophageal reflux disease)     rarely, associated with onions and red sauces, avoids both.  PRN Tums, EGD with Dr. Rich 2017, op report reviewed, no HH. urease neg. serum h. pyl neg   • H. pylori infection     symptoms of abodminal pain/dyspepsia, tx   • History of MRSA infection     UTI WITH REPORRTED 3-4 CLEAN CATCH WWITH NO mrSA    • Hyperlipidemia    • Hypertension    • IBS (irritable bowel syndrome)     constipation   • Interstitial cystitis     recent procedure to \"stretch\" the bladder, feels better; has pain in bladder as primary symptom, dyspareunia   • Mitral valve regurgitation     better now, has no symptoms, + hear murmur   • Mood disorder (CMS/HCC)     no formal dx of bipolar disorder, but after bad divorce took lithium   • Nausea     car sickness, has PRN Zofran, motion sickness, previously took meclizine   • Peripheral edema    • PONV (postoperative nausea and vomiting)    • Prediabetes    • Psoriasis    • Psoriatic arthritis (CMS/HCC)    • Sleep apnea     no longer have since sleeve   • Trauma of chest      moving trailer, fell on her, several cracked ribs on left, feels it caused her umbo hernia recurrence.  had to be dug out.  Exhusband fx pelvis, mult surgeries.   • Vitamin D deficiency        PAST SURGICAL HISTORY:  Past Surgical History:   Procedure Laterality Date   • APPENDECTOMY     • CARDIAC CATHETERIZATION      done after discovery of MVP, also had a CHERISE, normal per patient   •  SECTION     • COLONOSCOPY     • CYSTOSCOPY BLADDER HYDRODISTENSION N/A 2017    Procedure: CYSTOSCOPY BLADDER HYDRODISTENSION;  Surgeon: Ion Herbert MD;  Location: [facility] OR;  Service:    • DIAGNOSTIC LAPAROSCOPY N/A 10/14/2016    Procedure: DIAGNOSTIC LAPAROSCOPY POSSIBLE RIGHT SALPINGOOPHORECTOMY;  Surgeon: Rory Owens DO;  Location:  [facility] OR;  Service:    • ENDOSCOPY      Dr. Rich   • ENDOSCOPY N/A " 2/23/2018    Procedure: ESOPHAGOGASTRODUODENOSCOPY;  Surgeon: Aneesh Mckeon MD;  Location:  [facility] OR;  Service:    • FOOT SURGERY  1984   • GASTRECTOMY     • GASTRIC SLEEVE LAPAROSCOPIC N/A 2/23/2018    Procedure: GASTRIC SLEEVE LAPAROSCOPIC;  Surgeon: Aneesh Mckeon MD;  Location:  [facility] OR;  Service:    • HYSTERECTOMY  2008, 2016    laparoscopy supra-cervical hysterectomy 2008, 2016 cervix and 1 ovary removed.  Remaining ovary has a cyst. initially done for pelvic pain/fibroids   • LAPAROSCOPIC APPENDECTOMY  2015   • PANNICULECTOMY N/A 9/2/2020    Procedure: PANNICULECTOMY;  Surgeon: Kelsi Estrada MD;  Location:  [facility] OR;  Service: General;  Laterality: N/A;   • PARAESOPHAGEAL HERNIA REPAIR N/A 2/23/2018    Procedure: PARAESOPHAGEAL HERNIA REPAIR LAPAROSCOPIC;  Surgeon: Aneesh Mckeon MD;  Location:  [facility] OR;  Service:    • SKIN BIOPSY     • TRACHELECTOMY N/A 10/14/2016    Procedure: TRACHELECTOMY VAGINAL;  Surgeon: Rory Owens DO;  Location:  [facility] OR;  Service:    • TUBAL ABDOMINAL LIGATION  2000    LEFT OVARY REMAINS   • UMBILICAL HERNIA REPAIR N/A 10/14/2016    Procedure: UMBILICAL HERNIA REPAIR;  Surgeon: Spike Porter MD;  Location:  [facility] OR;  Service:    • UMBILICAL HERNIA REPAIR N/A 12/9/2016    Procedure: UMBILICAL HERNIA REPAIR;  Surgeon: Spike Porter MD;  Location:  [facility] OR;  with mesh, supraumbilical   • WISDOM TOOTH EXTRACTION  2000       FAMILY HISTORY:  Family History   Problem Relation Age of Onset   • Diabetes Mother    • Hypertension Mother    • Stroke Mother    • Heart disease Mother    • Cancer Father    • Sleep apnea Father    • Diabetes Father    • Anesthesia problems Sister    • Heart attack Maternal Grandmother 70   • Obesity Maternal Grandmother    • Hypertension Maternal Grandmother    • Heart disease Maternal Grandmother    • Sleep apnea Maternal Grandmother    • Stroke Maternal Grandmother    • Heart attack Maternal Grandfather    • Heart disease  Maternal Grandfather    • Heart attack Paternal Grandmother 54   • Sleep apnea Paternal Grandmother    • Heart disease Paternal Grandmother    • Hypertension Paternal Grandmother    • Obesity Paternal Grandmother    • Rheum arthritis Neg Hx    • Osteoarthritis Neg Hx    • Asthma Neg Hx    • Heart failure Neg Hx    • Hyperlipidemia Neg Hx    • Migraines Neg Hx    • Rashes / Skin problems Neg Hx    • Seizures Neg Hx    • Thyroid disease Neg Hx    • Breast cancer Neg Hx        SOCIAL HISTORY:  Social History     Socioeconomic History   • Marital status:    Tobacco Use   • Smoking status: Former     Packs/day: 0.50     Years: 10.00     Pack years: 5.00     Types: Cigarettes     Quit date: 1/1/2013     Years since quitting: 10.2   • Smokeless tobacco: Never   • Tobacco comments:     Is around secondhand smoke occasionally in car but not in house.  smokes - not in house or cars   Substance and Sexual Activity   • Alcohol use: No     Comment: on occassion socially   • Drug use: No   • Sexual activity: Defer     Birth control/protection: Surgical     MEDICATIONS:  The current medication list was reviewed in the EMR    Current Outpatient Medications:   •  albuterol sulfate  (90 Base) MCG/ACT inhaler, Inhale 2 puffs Every 4 (Four) Hours As Needed for Wheezing or Shortness of Air., Disp: 1 inhaler, Rfl: 0  •  docusate sodium 100 MG capsule, Take 100 mg by mouth 2 (Two) Times a Day., Disp: , Rfl:   •  HYDROcodone-acetaminophen (NORCO) 5-325 MG per tablet, Take 1 tablet by mouth Every 6 (Six) Hours As Needed for Moderate Pain ., Disp: 12 tablet, Rfl: 0  •  HYDROcodone-acetaminophen (Norco) 7.5-325 MG per tablet, Take 1 tablet by mouth 4 (Four) Times a Day As Needed for Moderate Pain ., Disp: 20 tablet, Rfl: 0  •  lisinopril (PRINIVIL,ZESTRIL) 20 MG tablet, Take 20 mg by mouth Daily., Disp: , Rfl:   •  methylPREDNISolone (MEDROL) 4 MG dose pack, Take as directed on package instructions., Disp: 1 each, Rfl:  "0  •  metoclopramide (REGLAN) 5 MG tablet, Take 1 tablet by mouth 4 (Four) Times a Day Before Meals & at Bedtime., Disp: 30 tablet, Rfl: 0  •  omeprazole (priLOSEC) 40 MG capsule, Take 40 mg by mouth 2 (two) times a day., Disp: , Rfl:   •  vitamin D (ERGOCALCIFEROL) 78889 units capsule capsule, Take 50,000 Units by mouth 1 (One) Time Per Week. Prior to [facility], Patient was on: takes on wednesdays, Disp: , Rfl:     ALLERGIES:    Allergies   Allergen Reactions   • Amlodipine GI Intolerance and Arrhythmia   • Nifedipine GI Intolerance and Arrhythmia     Adalat, procardia   • Morphine And Related Itching     Dilaudid ok, percocet/lortab ok   • Adhesive Tape Rash     Can tolerate steristrips and skin glue   • Chlorhexidine Rash   • Diazepam Anxiety     \"reverse effect,\" \"makes me absolutely crazy\"   • Midazolam Anxiety   • Nsaids Unknown (See Comments)     Pt states she cannot take NSAIDs for a peroid of time after having her Gastric Sleeve Surgery    • Other Other (See Comments)     Pt states she cannot taken Steroids for some time after Gastric sleeve operation    • Sulfa Antibiotics Rash       REVIEW OF SYSTEMS:    A comprehensive 14 point review of systems was performed.  Significant findings as mentioned above.  All other systems reviewed and are negative.        Physical Exam   Vital Signs: /78   Pulse 76   Ht 157.5 cm (62\")   Wt 97.5 kg (215 lb)   LMP  (LMP Unknown) Comment: hysterectomy  BMI 39.32 kg/m²    General: Obese, Alert and oriented x 3, in no acute distress.   Head: ATNC   Eyes: PERRL, No evidence of conjunctivitis.   Nose: No nasal discharge.   Mouth: Oral mucosal membranes moist. No oral ulceration or hemorrhages.   Neck: Neck supple. No thyromegaly. No JVD.   Lungs: CTAB  Heart:  Regular rate and rhythm. No murmurs, rubs, or gallops.   Abdomen: Soft. Bowel sounds are normoactive. Nontender with palpation.  Extremities: No cyanosis or edema.   Neurologic: Grossly non-focal " exam    IMAGING:     CT Abdomen Pelvis With Contrast    Result Date: 2/20/2023  1. Evidence of a mild small bowel ileus. No bowel obstruction. 2. Gastric sleeve surgery, appendectomy, hysterectomy, and umbilical hernia repair. 3. Prominent small bowel mesenteric lymph nodes with some associated fat stranding, likely infectious or inflammatory. Attention on 3 month follow-up CT is recommended. There is no other adenopathy. 4. Normal nonobstructed kidneys. Signer Name: Vipul Salazar MD  Signed: 2/20/2023 1:07 AM  Workstation Name: [id number]  [facility]      RECENT LABS:  Lab Results   Component Value Date    WBC 4.98 02/19/2023    HGB 14.1 02/19/2023    HCT 42.1 02/19/2023    MCV 90.0 02/19/2023    RDW 13.4 02/19/2023     02/19/2023    NEUTRORELPCT 48.4 02/09/2022    LYMPHORELPCT 37.4 02/09/2022    MONORELPCT 7.5 02/09/2022    EOSRELPCT 6.0 02/09/2022    BASORELPCT 0.5 02/09/2022    NEUTROABS 2.34 02/19/2023    LYMPHSABS 3.13 (H) 02/09/2022       Lab Results   Component Value Date     02/19/2023    K 3.6 02/19/2023    CO2 23.0 02/19/2023     02/19/2023    BUN 10 02/19/2023    CREATININE 0.54 (L) 02/19/2023    EGFRIFNONA 100 02/09/2022    EGFRIFAFRI 118 09/19/2019    GLUCOSE 108 (H) 02/19/2023    CALCIUM 9.0 02/19/2023    ALKPHOS 89 02/19/2023    AST 22 02/19/2023    ALT 22 02/19/2023    BILITOT 0.2 02/19/2023    ALBUMIN 3.9 02/19/2023    PROTEINTOT 7.3 02/19/2023    MG 2.1 02/19/2023     ASSESSMENT & PLAN:  Shaun Morales is a  52 y.o. female with    1.  Mild small bowel ileus  2.  Lower abdominal pain  3. IBS-C  4. Gastroparesis    -CT A/P from 2/20/23 showed evidence of a mild small bowel ileus with prominent small bowel mesenteric lymph nodes with some associated fat stranding, likely infectious or inflammatory. Of note, she is s/p gastric sleeve surgery, appendectomy, hysterectomy and umbilical hernia repair. There was no surgical intervention required and she was  discharged home on Reglan which has been very helpful (likely has gastroparesis-obesity/prediabetic). Happily, her acute issues have resolved, but continues with lower abdominal pain. D/w Dr. Gerard who recommended to repeat colonoscopy to evaluate for possible Crohn's given recent CT imaging findings and lower abdominal pain. Will also plan to repeat CT A/P as recommended per radiology in 3 months (mid May), will order today.      The patient was in agreement with the plan and all questions were answered to her satisfaction.     Thank you so much for allowing us to participate in the care of Shaun Morales . Please do not hesitate to contact us with any questions or concerns.             Electronically Signed by: PALOMO Sol , March 30, 2023 09:16 EDT       CC:   AMANDA Sow Garrett, PA

## 2023-03-29 ENCOUNTER — OFFICE VISIT (OUTPATIENT)
Dept: GASTROENTEROLOGY | Facility: CLINIC | Age: 53
End: 2023-03-29
Payer: COMMERCIAL

## 2023-03-29 VITALS
WEIGHT: 215 LBS | DIASTOLIC BLOOD PRESSURE: 78 MMHG | HEART RATE: 76 BPM | BODY MASS INDEX: 39.56 KG/M2 | HEIGHT: 62 IN | SYSTOLIC BLOOD PRESSURE: 124 MMHG

## 2023-03-29 DIAGNOSIS — K31.84 GASTROPARESIS: ICD-10-CM

## 2023-03-29 DIAGNOSIS — Z12.11 ENCOUNTER FOR SCREENING FOR MALIGNANT NEOPLASM OF COLON: Primary | ICD-10-CM

## 2023-03-29 DIAGNOSIS — K58.1 IRRITABLE BOWEL SYNDROME WITH CONSTIPATION: ICD-10-CM

## 2023-03-29 DIAGNOSIS — K56.7 ILEUS: ICD-10-CM

## 2023-03-29 DIAGNOSIS — R10.30 LOWER ABDOMINAL PAIN: ICD-10-CM

## 2023-03-29 RX ORDER — BISACODYL 5 MG/1
20 TABLET, DELAYED RELEASE ORAL ONCE
Qty: 4 TABLET | Refills: 0 | Status: SHIPPED | OUTPATIENT
Start: 2023-03-29 | End: 2023-03-29

## 2023-03-29 RX ORDER — POLYETHYLENE GLYCOL 3350 17 G/17G
510 POWDER, FOR SOLUTION ORAL TAKE AS DIRECTED
Qty: 510 G | Refills: 0 | Status: SHIPPED | OUTPATIENT
Start: 2023-03-29

## 2023-04-04 ENCOUNTER — TELEPHONE (OUTPATIENT)
Dept: GASTROENTEROLOGY | Facility: CLINIC | Age: 53
End: 2023-04-04
Payer: COMMERCIAL

## 2023-04-04 DIAGNOSIS — K31.84 GASTROPARESIS: Primary | ICD-10-CM

## 2023-04-04 RX ORDER — METOCLOPRAMIDE 10 MG/1
10 TABLET ORAL
Qty: 120 TABLET | Refills: 11 | Status: SHIPPED | OUTPATIENT
Start: 2023-04-04

## 2023-04-04 NOTE — TELEPHONE ENCOUNTER
Jaymie Luke was suppose to send in Reglan for patient, but didn't. Can You please send it in for patient.

## 2023-04-04 NOTE — TELEPHONE ENCOUNTER
Patient said they were told that a prescription for reglan would be called into [facility] pharmacy on [address] Requesting a call back.

## 2023-04-04 NOTE — TELEPHONE ENCOUNTER
Called patient no answer, voicemail is full unable to leave a message. Reglan RX was given by Dr. Quarles and was sent to [facility] pharmacy on [address].

## 2023-04-28 PROBLEM — Z12.11 ENCOUNTER FOR SCREENING FOR MALIGNANT NEOPLASM OF COLON: Status: ACTIVE | Noted: 2023-04-28

## 2023-05-09 ENCOUNTER — ANESTHESIA EVENT (OUTPATIENT)
Dept: PERIOP | Facility: HOSPITAL | Age: 53
End: 2023-05-09
Payer: COMMERCIAL

## 2023-05-09 ENCOUNTER — HOSPITAL ENCOUNTER (OUTPATIENT)
Facility: HOSPITAL | Age: 53
Setting detail: HOSPITAL OUTPATIENT SURGERY
Discharge: HOME | End: 2023-05-09
Attending: INTERNAL MEDICINE | Admitting: INTERNAL MEDICINE
Payer: COMMERCIAL

## 2023-05-09 ENCOUNTER — ANESTHESIA (OUTPATIENT)
Dept: PERIOP | Facility: HOSPITAL | Age: 53
End: 2023-05-09
Payer: COMMERCIAL

## 2023-05-09 VITALS
DIASTOLIC BLOOD PRESSURE: 55 MMHG | HEART RATE: 62 BPM | WEIGHT: 210 LBS | TEMPERATURE: 97.1 F | HEIGHT: 62 IN | SYSTOLIC BLOOD PRESSURE: 102 MMHG | OXYGEN SATURATION: 98 % | RESPIRATION RATE: 18 BRPM | BODY MASS INDEX: 38.64 KG/M2

## 2023-05-09 DIAGNOSIS — Z12.11 ENCOUNTER FOR SCREENING FOR MALIGNANT NEOPLASM OF COLON: ICD-10-CM

## 2023-05-09 DIAGNOSIS — R10.30 LOWER ABDOMINAL PAIN: ICD-10-CM

## 2023-05-09 PROCEDURE — 25010000002 PROPOFOL 10 MG/ML EMULSION: Performed by: NURSE ANESTHETIST, CERTIFIED REGISTERED

## 2023-05-09 PROCEDURE — 45378 DIAGNOSTIC COLONOSCOPY: CPT | Performed by: INTERNAL MEDICINE

## 2023-05-09 RX ORDER — IPRATROPIUM BROMIDE AND ALBUTEROL SULFATE 2.5; .5 MG/3ML; MG/3ML
3 SOLUTION RESPIRATORY (INHALATION) ONCE AS NEEDED
Status: DISCONTINUED | OUTPATIENT
Start: 2023-05-09 | End: 2023-05-09 | Stop reason: HOSPADM

## 2023-05-09 RX ORDER — SODIUM CHLORIDE 0.9 % (FLUSH) 0.9 %
10 SYRINGE (ML) INJECTION AS NEEDED
Status: DISCONTINUED | OUTPATIENT
Start: 2023-05-09 | End: 2023-05-09 | Stop reason: HOSPADM

## 2023-05-09 RX ORDER — MIDAZOLAM HYDROCHLORIDE 1 MG/ML
1 INJECTION INTRAMUSCULAR; INTRAVENOUS
Status: DISCONTINUED | OUTPATIENT
Start: 2023-05-09 | End: 2023-05-09 | Stop reason: HOSPADM

## 2023-05-09 RX ORDER — PROPOFOL 10 MG/ML
VIAL (ML) INTRAVENOUS CONTINUOUS PRN
Status: DISCONTINUED | OUTPATIENT
Start: 2023-05-09 | End: 2023-05-09 | Stop reason: SURG

## 2023-05-09 RX ORDER — SODIUM CHLORIDE 9 MG/ML
40 INJECTION, SOLUTION INTRAVENOUS AS NEEDED
Status: DISCONTINUED | OUTPATIENT
Start: 2023-05-09 | End: 2023-05-09 | Stop reason: HOSPADM

## 2023-05-09 RX ORDER — MEPERIDINE HYDROCHLORIDE 25 MG/ML
12.5 INJECTION INTRAMUSCULAR; INTRAVENOUS; SUBCUTANEOUS
Status: DISCONTINUED | OUTPATIENT
Start: 2023-05-09 | End: 2023-05-09 | Stop reason: HOSPADM

## 2023-05-09 RX ORDER — SODIUM CHLORIDE, SODIUM LACTATE, POTASSIUM CHLORIDE, CALCIUM CHLORIDE 600; 310; 30; 20 MG/100ML; MG/100ML; MG/100ML; MG/100ML
100 INJECTION, SOLUTION INTRAVENOUS ONCE AS NEEDED
Status: DISCONTINUED | OUTPATIENT
Start: 2023-05-09 | End: 2023-05-09 | Stop reason: HOSPADM

## 2023-05-09 RX ORDER — SODIUM CHLORIDE, SODIUM LACTATE, POTASSIUM CHLORIDE, CALCIUM CHLORIDE 600; 310; 30; 20 MG/100ML; MG/100ML; MG/100ML; MG/100ML
125 INJECTION, SOLUTION INTRAVENOUS ONCE
Status: COMPLETED | OUTPATIENT
Start: 2023-05-09 | End: 2023-05-09

## 2023-05-09 RX ORDER — SODIUM CHLORIDE 0.9 % (FLUSH) 0.9 %
10 SYRINGE (ML) INJECTION EVERY 12 HOURS SCHEDULED
Status: DISCONTINUED | OUTPATIENT
Start: 2023-05-09 | End: 2023-05-09 | Stop reason: HOSPADM

## 2023-05-09 RX ORDER — FENTANYL CITRATE 50 UG/ML
50 INJECTION, SOLUTION INTRAMUSCULAR; INTRAVENOUS
Status: DISCONTINUED | OUTPATIENT
Start: 2023-05-09 | End: 2023-05-09 | Stop reason: HOSPADM

## 2023-05-09 RX ORDER — ONDANSETRON 2 MG/ML
4 INJECTION INTRAMUSCULAR; INTRAVENOUS AS NEEDED
Status: DISCONTINUED | OUTPATIENT
Start: 2023-05-09 | End: 2023-05-09 | Stop reason: HOSPADM

## 2023-05-09 RX ORDER — OXYCODONE HYDROCHLORIDE AND ACETAMINOPHEN 5; 325 MG/1; MG/1
1 TABLET ORAL ONCE AS NEEDED
Status: DISCONTINUED | OUTPATIENT
Start: 2023-05-09 | End: 2023-05-09 | Stop reason: HOSPADM

## 2023-05-09 RX ORDER — MONTELUKAST SODIUM 10 MG/1
TABLET ORAL
COMMUNITY
Start: 2023-05-04

## 2023-05-09 RX ADMIN — SODIUM CHLORIDE, POTASSIUM CHLORIDE, SODIUM LACTATE AND CALCIUM CHLORIDE: 600; 310; 30; 20 INJECTION, SOLUTION INTRAVENOUS at 09:56

## 2023-05-09 RX ADMIN — PROPOFOL 200 MCG/KG/MIN: 10 INJECTION, EMULSION INTRAVENOUS at 09:56

## 2023-05-09 NOTE — ANESTHESIA POSTPROCEDURE EVALUATION
Patient: Shaun Morales    Procedure Summary     Date: 05/09/23 Room / Location: [facility] OR 07 /  [facility] OR    Anesthesia Start: 0953 Anesthesia Stop: 1015    Procedure: COLONOSCOPY Diagnosis:       Encounter for screening for malignant neoplasm of colon      Lower abdominal pain      (Encounter for screening for malignant neoplasm of colon [Z12.11])      (Lower abdominal pain [R10.30])    Surgeons: Sona Guadalupe MD Provider: Eh Colindres MD    Anesthesia Type: general ASA Status: 2          Anesthesia Type: general    Vitals  Vitals Value Taken Time   /55 05/09/23 1045   Temp 97.1 °F (36.2 °C) 05/09/23 1015   Pulse 62 05/09/23 1045   Resp 18 05/09/23 1045   SpO2 98 % 05/09/23 1045           Post Anesthesia Care and Evaluation    Patient location during evaluation: PACU  Patient participation: complete - patient participated  Level of consciousness: awake  Pain score: 1  Pain management: adequate    Airway patency: patent  Anesthetic complications: No anesthetic complications  PONV Status: controlled  Cardiovascular status: acceptable and blood pressure returned to baseline  Respiratory status: acceptable and room air  Hydration status: acceptable    Comments: Patient comfortable with discharge at this time.

## 2023-05-09 NOTE — H&P
[facility]    Patient Identification:  Name:  Shaun Morales  Age:  52 y.o.  Sex:  female  :  1970  MRN:  [id number]   Visit Number:  [id number]  Primary Care Physician:  Jesus Snow PA       Chief complaint: Lower abdominal pain, abnormal CT imaging    History of presenting illness: Ms. Morales is a 52 y.o. female who presents today for colonoscopy. Patient underwent CT A/P on 23 at [facility] ED which showed evidence of a mild small bowel ileus with prominent small bowel mesenteric lymph nodes with some associated fat stranding, likely infectious or inflammatory. Of note, she is s/p gastric sleeve surgery, appendectomy, hysterectomy and umbilical hernia repair. There was no surgical intervention required and she was discharged home on Reglan which has been very helpful (likely has gastroparesis-obesity/prediabetic). She continues to have intermittent lower abdominal pain. Since starting reglan, she reports having normal (type 4 on bristol stool scale) bowel movements daily and she is no longer having nausea. She denies having blood in her stool, melena or BRBPR. Repeat colonoscopy was recommended to evaluate for possible Crohn's given recent CT imaging findings and lower abdominal pain. She had colonoscopy with Dr. Porter in 2016 which was unremarkable.Denies family history of colon cancer. She has no other complaints today.     Review of Systems   Constitutional: Negative.    HENT: Negative.    Eyes: Negative.    Respiratory: Negative.    Cardiovascular: Negative.    Gastrointestinal: Positive for abdominal pain. Negative for abdominal distention, anal bleeding, blood in stool, constipation, diarrhea, nausea and vomiting.   Endocrine: Negative.    Genitourinary: Negative.    Musculoskeletal: Negative.    Skin: Negative.    Allergic/Immunologic: Negative.    Neurological: Negative.    Hematological: Negative.         Past Medical History:   Diagnosis Date  "  • Anxiety    • Asthma     mild   • Back pain    • Depression    • Elevated cholesterol    • Environmental allergies     pollen, pollution   • Family history of pseudocholinesterase deficiency     sister, dad had it.  Unsure if she has it, but reports has never had succinylcholine   • Fatigue    • GERD (gastroesophageal reflux disease)     rarely, associated with onions and red sauces, avoids both.  PRN Tums, EGD with Dr. Rich 2017, op report reviewed, no HH. urease neg. serum h. pyl neg   • H. pylori infection     symptoms of abodminal pain/dyspepsia, tx   • History of MRSA infection     UTI WITH REPORRTED 3-4 CLEAN CATCH WWITH NO mrSA    • Hyperlipidemia    • Hypertension    • IBS (irritable bowel syndrome)     constipation   • Interstitial cystitis     recent procedure to \"stretch\" the bladder, feels better; has pain in bladder as primary symptom, dyspareunia   • Mitral valve regurgitation     better now, has no symptoms, + hear murmur   • Mood disorder     no formal dx of bipolar disorder, but after bad divorce took lithium   • Nausea     car sickness, has PRN Zofran, motion sickness, previously took meclizine   • Peripheral edema    • PONV (postoperative nausea and vomiting)    • Prediabetes    • Psoriasis    • Psoriatic arthritis    • Sleep apnea     no longer have since sleeve   • Trauma of chest      moving trailer, fell on her, several cracked ribs on left, feels it caused her umbo hernia recurrence.  had to be dug out.  Exhusband fx pelvis, mult surgeries.   • Vitamin D deficiency      Past Surgical History:   Procedure Laterality Date   • APPENDECTOMY     • CARDIAC CATHETERIZATION      done after discovery of MVP, also had a CHERISE, normal per patient   •  SECTION     • COLONOSCOPY     • CYSTOSCOPY BLADDER HYDRODISTENSION N/A 2017    Procedure: CYSTOSCOPY BLADDER HYDRODISTENSION;  Surgeon: Ion Herbert MD;  Location: [facility] OR;  Service:    • DIAGNOSTIC " LAPAROSCOPY N/A 10/14/2016    Procedure: DIAGNOSTIC LAPAROSCOPY POSSIBLE RIGHT SALPINGOOPHORECTOMY;  Surgeon: Rory Owens DO;  [facility] OR;  Service:    • ENDOSCOPY  2017    Dr. Rich   • ENDOSCOPY N/A 2/23/2018    Procedure: ESOPHAGOGASTRODUODENOSCOPY;  Surgeon: Aneesh Mckeon MD;  Location:  [facility] OR;  Service:    • FOOT SURGERY  1984   • GASTRECTOMY     • GASTRIC SLEEVE LAPAROSCOPIC N/A 2/23/2018    Procedure: GASTRIC SLEEVE LAPAROSCOPIC;  Surgeon: Aneesh Mckeon MD;  Location:  [facility] OR;  Service:    • HYSTERECTOMY  2008, 2016    laparoscopy supra-cervical hysterectomy 2008, 2016 cervix and 1 ovary removed.  Remaining ovary has a cyst. initially done for pelvic pain/fibroids   • LAPAROSCOPIC APPENDECTOMY  2015   • PANNICULECTOMY N/A 9/2/2020    Procedure: PANNICULECTOMY;  Surgeon: Kelsi Estrada MD;  Location:  [facility] OR;  Service: General;  Laterality: N/A;   • PARAESOPHAGEAL HERNIA REPAIR N/A 2/23/2018    Procedure: PARAESOPHAGEAL HERNIA REPAIR LAPAROSCOPIC;  Surgeon: Aneesh Mckeon MD;  Location:  [facility] OR;  Service:    • SKIN BIOPSY     • TRACHELECTOMY N/A 10/14/2016    Procedure: TRACHELECTOMY VAGINAL;  Surgeon: Rory Owens DO;  Location:  [facility] OR;  Service:    • TUBAL ABDOMINAL LIGATION  2000    LEFT OVARY REMAINS   • UMBILICAL HERNIA REPAIR N/A 10/14/2016    Procedure: UMBILICAL HERNIA REPAIR;  Surgeon: Spike Porter MD;  [facility]:  [facility] OR;  Service:    • UMBILICAL HERNIA REPAIR N/A 12/9/2016    Procedure: UMBILICAL HERNIA REPAIR;  Surgeon: Spike Porter MD;  Location:  [facility] OR;  with mesh, supraumbilical   • WISDOM TOOTH EXTRACTION  2000     Family History   Problem Relation Age of Onset   • Diabetes Mother    • Hypertension Mother    • Stroke Mother    • Heart disease Mother    • Cancer Father    • Sleep apnea Father    • Diabetes Father    • Anesthesia problems Sister    • Heart attack Maternal Grandmother 70   • Obesity Maternal Grandmother    •  Hypertension Maternal Grandmother    • Heart disease Maternal Grandmother    • Sleep apnea Maternal Grandmother    • Stroke Maternal Grandmother    • Heart attack Maternal Grandfather    • Heart disease Maternal Grandfather    • Heart attack Paternal Grandmother 54   • Sleep apnea Paternal Grandmother    • Heart disease Paternal Grandmother    • Hypertension Paternal Grandmother    • Obesity Paternal Grandmother    • Rheum arthritis Neg Hx    • Osteoarthritis Neg Hx    • Asthma Neg Hx    • Heart failure Neg Hx    • Hyperlipidemia Neg Hx    • Migraines Neg Hx    • Rashes / Skin problems Neg Hx    • Seizures Neg Hx    • Thyroid disease Neg Hx    • Breast cancer Neg Hx      Social History     Socioeconomic History   • Marital status:    Tobacco Use   • Smoking status: Former     Packs/day: 0.50     Years: 10.00     Pack years: 5.00     Types: Cigarettes     Quit date: 1/1/2013     Years since quitting: 10.3   • Smokeless tobacco: Never   • Tobacco comments:     Is around secondhand smoke occasionally in car but not in house.  smokes - not in house or cars   Vaping Use   • Vaping Use: Never used   Substance and Sexual Activity   • Alcohol use: No     Comment: on occassion socially   • Drug use: No   • Sexual activity: Defer     Birth control/protection: Surgical       Allergies:  Amlodipine, Nifedipine, Morphine and related, Adhesive tape, Chlorhexidine, Diazepam, Midazolam, Nsaids, and Sulfa antibiotics    Prior to Admission Medications     Prescriptions Last Dose Informant Patient Reported? Taking?    albuterol sulfate  (90 Base) MCG/ACT inhaler Past Week Self No Yes    Inhale 2 puffs Every 4 (Four) Hours As Needed for Wheezing or Shortness of Air.    docusate sodium 100 MG capsule 5/8/2023  No Yes    Take 100 mg by mouth 2 (Two) Times a Day.    lisinopril (PRINIVIL,ZESTRIL) 20 MG tablet 5/9/2023 Self Yes Yes    Take 1 tablet by mouth Daily.    metoclopramide (REGLAN) 10 MG tablet 5/9/2023  No Yes     Take 1 tablet by mouth 4 (Four) Times a Day Before Meals & at Bedtime.    montelukast (SINGULAIR) 10 MG tablet 5/9/2023  Yes Yes    omeprazole (priLOSEC) 40 MG capsule 5/9/2023  Yes Yes    Take 1 capsule by mouth 2 (two) times a day.    polyethylene glycol (MIRALAX) 17 GM/SCOOP powder 5/8/2023  No Yes    Take 510 g by mouth Take As Directed. Place 15 cap fulls of powder in 32 oz of liquid of choice at 4:00 and 10:00 PM    vitamin D (ERGOCALCIFEROL) 72203 units capsule capsule Past Week Self Yes Yes    Take 1 capsule by mouth 1 (One) Time Per Week. Prior to [employer] Admission, Patient was on: takes on wednesdays        Hospital Scheduled Meds:  lactated ringers, 125 mL/hr, Intravenous, Once  sodium chloride, 10 mL, Intravenous, Q12H      Vital Signs:  Temp:  [97.9 °F (36.6 °C)] 97.9 °F (36.6 °C)  Heart Rate:  [62] 62  Resp:  [16] 16  BP: (117)/(73) 117/73      05/09/23  0903   Weight: 95.3 kg (210 lb)     Body mass index is 38.41 kg/m².    Physical Exam:  Constitutional: Obese. Alert and oriented. Well developed and well nourished, in no acute distress.  HENT:  Head: Normocephalic and atraumatic.  Mouth:  Moist mucous membranes.  OP clear, mmm  Eyes:  Conjunctivae and EOM are normal.  Pupils are equal, round, and reactive to light.  No scleral icterus.  Neck:  Neck supple.  No JVD present.    Cardiovascular:  RRR, no MRG.  Pulmonary/Chest:  CTAB, unlabored.   Abdominal:  Soft.  Bowel sounds are normal.  No distension and no tenderness.   Musculoskeletal:  No edema, no tenderness, and no deformity.   Neurological:  MS as above, grossly nonfocal exam         Lab Results   Component Value Date    HGBA1C 6.40 (H) 02/22/2018     No results found for: TSH, FREET4  No results found for: PREGTESTUR, PREGSERUM, HCG, HCGQUANT  Pain Management Panel         Latest Ref Rng & Units 1/2/2017   Pain Management Panel   Amphetamine, Urine Qual Negative Negative     Barbiturates Screen, Urine Negative Negative     Benzodiazepine  Screen, Urine Negative Negative     Cocaine Screen, Urine Negative Negative     Methadone Screen , Urine Negative Negative              Assessment and Plan:  1. Proceed with colonoscopy for evaluation of lower abdominal pain and abnormal CT imaging.     Jaymie Johns, APRN  05/09/23  09:10 EDT

## 2023-05-22 ENCOUNTER — HOSPITAL ENCOUNTER (OUTPATIENT)
Dept: CT IMAGING | Facility: HOSPITAL | Age: 53
Discharge: HOME | End: 2023-05-22
Admitting: NURSE PRACTITIONER
Payer: COMMERCIAL

## 2023-05-22 DIAGNOSIS — K58.1 IRRITABLE BOWEL SYNDROME WITH CONSTIPATION: ICD-10-CM

## 2023-05-22 DIAGNOSIS — R10.30 LOWER ABDOMINAL PAIN: ICD-10-CM

## 2023-05-22 DIAGNOSIS — K56.7 ILEUS: ICD-10-CM

## 2023-05-22 LAB — CREAT BLDA-MCNC: 0.6 MG/DL (ref 0.6–1.3)

## 2023-05-22 PROCEDURE — 82565 ASSAY OF CREATININE: CPT

## 2023-05-22 PROCEDURE — 25510000001 IOPAMIDOL 61 % SOLUTION: Performed by: NURSE PRACTITIONER

## 2023-05-22 PROCEDURE — 74177 CT ABD & PELVIS W/CONTRAST: CPT

## 2023-05-22 PROCEDURE — 74177 CT ABD & PELVIS W/CONTRAST: CPT | Performed by: RADIOLOGY

## 2023-05-22 RX ADMIN — IOPAMIDOL 100 ML: 612 INJECTION, SOLUTION INTRAVENOUS at 07:37

## 2023-09-15 ENCOUNTER — APPOINTMENT (OUTPATIENT)
Dept: CT IMAGING | Facility: HOSPITAL | Age: 53
End: 2023-09-15
Payer: COMMERCIAL

## 2023-09-15 ENCOUNTER — HOSPITAL ENCOUNTER (EMERGENCY)
Facility: HOSPITAL | Age: 53
Discharge: HOME | End: 2023-09-15
Attending: STUDENT IN AN ORGANIZED HEALTH CARE EDUCATION/TRAINING PROGRAM
Payer: COMMERCIAL

## 2023-09-15 VITALS
BODY MASS INDEX: 39.56 KG/M2 | SYSTOLIC BLOOD PRESSURE: 116 MMHG | HEART RATE: 77 BPM | WEIGHT: 215 LBS | OXYGEN SATURATION: 99 % | TEMPERATURE: 97.6 F | DIASTOLIC BLOOD PRESSURE: 65 MMHG | HEIGHT: 62 IN | RESPIRATION RATE: 18 BRPM

## 2023-09-15 DIAGNOSIS — K52.9 COLITIS: Primary | ICD-10-CM

## 2023-09-15 LAB
ALBUMIN SERPL-MCNC: 4.3 G/DL (ref 3.5–5.2)
ALBUMIN/GLOB SERPL: 1.3 G/DL
ALP SERPL-CCNC: 82 U/L (ref 39–117)
ALT SERPL W P-5'-P-CCNC: 14 U/L (ref 1–33)
ANION GAP SERPL CALCULATED.3IONS-SCNC: 12.7 MMOL/L (ref 5–15)
AST SERPL-CCNC: 14 U/L (ref 1–32)
BASOPHILS # BLD AUTO: 0.04 10*3/MM3 (ref 0–0.2)
BASOPHILS NFR BLD AUTO: 0.5 % (ref 0–1.5)
BILIRUB SERPL-MCNC: 0.5 MG/DL (ref 0–1.2)
BILIRUB UR QL STRIP: ABNORMAL
BUN SERPL-MCNC: 14 MG/DL (ref 6–20)
BUN/CREAT SERPL: 23 (ref 7–25)
CALCIUM SPEC-SCNC: 9 MG/DL (ref 8.6–10.5)
CHLORIDE SERPL-SCNC: 104 MMOL/L (ref 98–107)
CLARITY UR: CLEAR
CO2 SERPL-SCNC: 21.3 MMOL/L (ref 22–29)
COLOR UR: ABNORMAL
CREAT SERPL-MCNC: 0.61 MG/DL (ref 0.57–1)
CRP SERPL-MCNC: 0.5 MG/DL (ref 0–0.5)
DEPRECATED RDW RBC AUTO: 43.1 FL (ref 37–54)
EGFRCR SERPLBLD CKD-EPI 2021: 108.4 ML/MIN/1.73
EOSINOPHIL # BLD AUTO: 0.27 10*3/MM3 (ref 0–0.4)
EOSINOPHIL NFR BLD AUTO: 3.1 % (ref 0.3–6.2)
ERYTHROCYTE [DISTWIDTH] IN BLOOD BY AUTOMATED COUNT: 13 % (ref 12.3–15.4)
ERYTHROCYTE [SEDIMENTATION RATE] IN BLOOD: 38 MM/HR (ref 0–30)
GLOBULIN UR ELPH-MCNC: 3.3 GM/DL
GLUCOSE SERPL-MCNC: 110 MG/DL (ref 65–99)
GLUCOSE UR STRIP-MCNC: NEGATIVE MG/DL
HCT VFR BLD AUTO: 41.8 % (ref 34–46.6)
HGB BLD-MCNC: 13.7 G/DL (ref 12–15.9)
HGB UR QL STRIP.AUTO: NEGATIVE
HOLD SPECIMEN: NORMAL
HOLD SPECIMEN: NORMAL
IMM GRANULOCYTES # BLD AUTO: 0.01 10*3/MM3 (ref 0–0.05)
IMM GRANULOCYTES NFR BLD AUTO: 0.1 % (ref 0–0.5)
KETONES UR QL STRIP: ABNORMAL
LEUKOCYTE ESTERASE UR QL STRIP.AUTO: NEGATIVE
LYMPHOCYTES # BLD AUTO: 2.38 10*3/MM3 (ref 0.7–3.1)
LYMPHOCYTES NFR BLD AUTO: 27.4 % (ref 19.6–45.3)
MCH RBC QN AUTO: 29.7 PG (ref 26.6–33)
MCHC RBC AUTO-ENTMCNC: 32.8 G/DL (ref 31.5–35.7)
MCV RBC AUTO: 90.5 FL (ref 79–97)
MONOCYTES # BLD AUTO: 0.84 10*3/MM3 (ref 0.1–0.9)
MONOCYTES NFR BLD AUTO: 9.7 % (ref 5–12)
NEUTROPHILS NFR BLD AUTO: 5.16 10*3/MM3 (ref 1.7–7)
NEUTROPHILS NFR BLD AUTO: 59.2 % (ref 42.7–76)
NITRITE UR QL STRIP: NEGATIVE
NRBC BLD AUTO-RTO: 0 /100 WBC (ref 0–0.2)
PH UR STRIP.AUTO: <=5 [PH] (ref 5–8)
PLATELET # BLD AUTO: 318 10*3/MM3 (ref 140–450)
PMV BLD AUTO: 11.2 FL (ref 6–12)
POTASSIUM SERPL-SCNC: 3.8 MMOL/L (ref 3.5–5.2)
PROT SERPL-MCNC: 7.6 G/DL (ref 6–8.5)
PROT UR QL STRIP: NEGATIVE
RBC # BLD AUTO: 4.62 10*6/MM3 (ref 3.77–5.28)
SODIUM SERPL-SCNC: 138 MMOL/L (ref 136–145)
SP GR UR STRIP: >=1.03 (ref 1–1.03)
UROBILINOGEN UR QL STRIP: ABNORMAL
WBC NRBC COR # BLD: 8.7 10*3/MM3 (ref 3.4–10.8)
WHOLE BLOOD HOLD COAG: NORMAL
WHOLE BLOOD HOLD SPECIMEN: NORMAL

## 2023-09-15 PROCEDURE — 96375 TX/PRO/DX INJ NEW DRUG ADDON: CPT

## 2023-09-15 PROCEDURE — 96374 THER/PROPH/DIAG INJ IV PUSH: CPT

## 2023-09-15 PROCEDURE — 74176 CT ABD & PELVIS W/O CONTRAST: CPT

## 2023-09-15 PROCEDURE — 81003 URINALYSIS AUTO W/O SCOPE: CPT | Performed by: NURSE PRACTITIONER

## 2023-09-15 PROCEDURE — 25010000002 PROCHLORPERAZINE 10 MG/2ML SOLUTION: Performed by: NURSE PRACTITIONER

## 2023-09-15 PROCEDURE — 85652 RBC SED RATE AUTOMATED: CPT | Performed by: NURSE PRACTITIONER

## 2023-09-15 PROCEDURE — 80053 COMPREHEN METABOLIC PANEL: CPT | Performed by: NURSE PRACTITIONER

## 2023-09-15 PROCEDURE — 25010000002 ONDANSETRON PER 1 MG: Performed by: NURSE PRACTITIONER

## 2023-09-15 PROCEDURE — 96376 TX/PRO/DX INJ SAME DRUG ADON: CPT

## 2023-09-15 PROCEDURE — 86140 C-REACTIVE PROTEIN: CPT | Performed by: NURSE PRACTITIONER

## 2023-09-15 PROCEDURE — 25010000002 HYDROMORPHONE 1 MG/ML SOLUTION: Performed by: NURSE PRACTITIONER

## 2023-09-15 PROCEDURE — 99284 EMERGENCY DEPT VISIT MOD MDM: CPT

## 2023-09-15 PROCEDURE — 85025 COMPLETE CBC W/AUTO DIFF WBC: CPT | Performed by: NURSE PRACTITIONER

## 2023-09-15 RX ORDER — ONDANSETRON 2 MG/ML
4 INJECTION INTRAMUSCULAR; INTRAVENOUS ONCE
Status: COMPLETED | OUTPATIENT
Start: 2023-09-15 | End: 2023-09-15

## 2023-09-15 RX ORDER — SODIUM CHLORIDE 0.9 % (FLUSH) 0.9 %
10 SYRINGE (ML) INJECTION AS NEEDED
Status: DISCONTINUED | OUTPATIENT
Start: 2023-09-15 | End: 2023-09-15 | Stop reason: HOSPADM

## 2023-09-15 RX ORDER — PROCHLORPERAZINE EDISYLATE 5 MG/ML
5 INJECTION INTRAMUSCULAR; INTRAVENOUS ONCE
Status: COMPLETED | OUTPATIENT
Start: 2023-09-15 | End: 2023-09-15

## 2023-09-15 RX ORDER — AMOXICILLIN AND CLAVULANATE POTASSIUM 875; 125 MG/1; MG/1
1 TABLET, FILM COATED ORAL 2 TIMES DAILY
Qty: 20 TABLET | Refills: 0 | Status: SHIPPED | OUTPATIENT
Start: 2023-09-15 | End: 2023-09-25

## 2023-09-15 RX ORDER — DICYCLOMINE HYDROCHLORIDE 10 MG/1
10 CAPSULE ORAL 3 TIMES DAILY PRN
Qty: 15 CAPSULE | Refills: 0 | Status: SHIPPED | OUTPATIENT
Start: 2023-09-15 | End: 2023-09-20

## 2023-09-15 RX ADMIN — HYDROMORPHONE HYDROCHLORIDE 1 MG: 1 INJECTION, SOLUTION INTRAMUSCULAR; INTRAVENOUS; SUBCUTANEOUS at 05:06

## 2023-09-15 RX ADMIN — HYDROMORPHONE HYDROCHLORIDE 1 MG: 1 INJECTION, SOLUTION INTRAMUSCULAR; INTRAVENOUS; SUBCUTANEOUS at 06:52

## 2023-09-15 RX ADMIN — SODIUM CHLORIDE 1000 ML: 9 INJECTION, SOLUTION INTRAVENOUS at 05:07

## 2023-09-15 RX ADMIN — PROCHLORPERAZINE EDISYLATE 5 MG: 5 INJECTION INTRAMUSCULAR; INTRAVENOUS at 06:52

## 2023-09-15 RX ADMIN — ONDANSETRON 4 MG: 2 INJECTION INTRAMUSCULAR; INTRAVENOUS at 05:06

## 2023-09-15 NOTE — ED PROVIDER NOTES
Subjective   History of Present Illness  Patient is a 53-year-old female with significant past medical history positive for GERD, depression, hyperlipidemia, back pain, anxiety, nausea, prediabetes, sleep apnea, cirrhotic arthritis, IBS presenting to the ER complaints of lower abdominal pain.  Patient reports that she did start taking Wegovy approximately 2 weeks ago.  Patient reports that her pain started as generalized abdominal pain about a day and a half ago.  Patient states that the pain got worse along with diarrhea.  Patient reports no dysuria, fever, known sick contacts, recent travel or suspicious food intake.  Patient denies any alleviating or worsening factors.    History provided by:  Patient   used: No      Review of Systems   Constitutional: Negative.  Negative for fever.   HENT: Negative.     Respiratory: Negative.     Cardiovascular: Negative.  Negative for chest pain.   Gastrointestinal:  Positive for abdominal pain and diarrhea.   Endocrine: Negative.    Genitourinary: Negative.  Negative for dysuria.   Skin: Negative.    Neurological: Negative.    Psychiatric/Behavioral: Negative.     All other systems reviewed and are negative.    Past Medical History:   Diagnosis Date    Anxiety     Asthma     mild    Back pain     Depression     Elevated cholesterol     Environmental allergies     pollen, pollution    Family history of pseudocholinesterase deficiency     sister, dad had it.  Unsure if she has it, but reports has never had succinylcholine    Fatigue     GERD (gastroesophageal reflux disease)     rarely, associated with onions and red sauces, avoids both.  PRN Tums, EGD with Dr. Rich 2017, op report reviewed, no HH. urease neg. serum h. pyl neg    H. pylori infection     symptoms of abodminal pain/dyspepsia, tx    History of MRSA infection 2012    UTI WITH REPORRTED 3-4 CLEAN CATCH WWITH NO mrSA     Hyperlipidemia     Hypertension     IBS (irritable bowel syndrome)      "constipation    Interstitial cystitis     recent procedure to \"stretch\" the bladder, feels better; has pain in bladder as primary symptom, dyspareunia    Mitral valve regurgitation     better now, has no symptoms, + hear murmur    Mood disorder     no formal dx of bipolar disorder, but after bad divorce took lithium    Nausea     car sickness, has PRN Zofran, motion sickness, previously took meclizine    Peripheral edema     PONV (postoperative nausea and vomiting)     Prediabetes     Psoriasis     Psoriatic arthritis     Sleep apnea     no longer have since sleeve    Trauma of chest      moving trailer, fell on her, several cracked ribs on left, feels it caused her umbo hernia recurrence.  had to be dug out.  Exhusband fx pelvis, mult surgeries.    Vitamin D deficiency        Allergies   Allergen Reactions    Amlodipine GI Intolerance and Arrhythmia    Nifedipine GI Intolerance and Arrhythmia     Adalat, procardia    Morphine And Related Itching     Dilaudid ok, percocet/lortab ok    Adhesive Tape Rash     Can tolerate steristrips and skin glue    Chlorhexidine Rash    Diazepam Anxiety     \"reverse effect,\" \"makes me absolutely crazy\"    Midazolam Anxiety    Nsaids Unknown (See Comments)     Pt states she cannot take NSAIDs for a peroid of time after having her Gastric Sleeve Surgery     Sulfa Antibiotics Rash       Past Surgical History:   Procedure Laterality Date    APPENDECTOMY      CARDIAC CATHETERIZATION      done after discovery of MVP, also had a CHERISE, normal per patient     SECTION      COLONOSCOPY      COLONOSCOPY N/A 2023    Procedure: COLONOSCOPY;  Surgeon: Sona Guadalupe MD;  Location: [facility];  Service: Gastroenterology;  Laterality: N/A;    CYSTOSCOPY BLADDER HYDRODISTENSION N/A 2017    Procedure: CYSTOSCOPY BLADDER HYDRODISTENSION;  Surgeon: Ion Herbert MD;  Location: [facility] OR;  Service:     DIAGNOSTIC LAPAROSCOPY N/A 10/14/2016    Procedure: " DIAGNOSTIC LAPAROSCOPY POSSIBLE RIGHT SALPINGOOPHORECTOMY;  Surgeon: Rory Owens DO;  Location:  [facility] OR;  Service:     ENDOSCOPY  2017    Dr. Rich    ENDOSCOPY N/A 2/23/2018    Procedure: ESOPHAGOGASTRODUODENOSCOPY;  Surgeon: Aneesh Mckeon MD;  Location:  [facility] OR;  Service:     FOOT SURGERY  1984    GASTRECTOMY      GASTRIC SLEEVE LAPAROSCOPIC N/A 2/23/2018    Procedure: GASTRIC SLEEVE LAPAROSCOPIC;  Surgeon: Aneesh Mckeon MD;  Location:  [facility] OR;  Service:     HYSTERECTOMY  2008, 2016    laparoscopy supra-cervical hysterectomy 2008, 2016 cervix and 1 ovary removed.  Remaining ovary has a cyst. initially done for pelvic pain/fibroids    LAPAROSCOPIC APPENDECTOMY  2015    PANNICULECTOMY N/A 9/2/2020    Procedure: PANNICULECTOMY;  Surgeon: Kelsi Estrada MD;  Location:  [facility] OR;  Service: General;  Laterality: N/A;    PARAESOPHAGEAL HERNIA REPAIR N/A 2/23/2018    Procedure: PARAESOPHAGEAL HERNIA REPAIR LAPAROSCOPIC;  Surgeon: Aneesh Mckeon MD;  Location:  [facility];  Service:     SKIN BIOPSY      TRACHELECTOMY N/A 10/14/2016    Procedure: TRACHELECTOMY VAGINAL;  Surgeon: Rory Owens DO;  Location:  [facility] OR;  Service:     TUBAL ABDOMINAL LIGATION  2000    LEFT OVARY REMAINS    UMBILICAL HERNIA REPAIR N/A 10/14/2016    Procedure: UMBILICAL HERNIA REPAIR;  Surgeon: Spike Porter MD;  [facility] OR;  Service:     UMBILICAL HERNIA REPAIR N/A 12/9/2016    Procedure: UMBILICAL HERNIA REPAIR;  Surgeon: Spike Porter MD;  Location:  [facility] OR;  with mesh, supraumbilical    WISDOM TOOTH EXTRACTION  2000       Family History   Problem Relation Age of Onset    Diabetes Mother     Hypertension Mother     Stroke Mother     Heart disease Mother     Cancer Father     Sleep apnea Father     Diabetes Father     Anesthesia problems Sister     Heart attack Maternal Grandmother 70    Obesity Maternal Grandmother     Hypertension Maternal Grandmother     Heart disease Maternal  Grandmother     Sleep apnea Maternal Grandmother     Stroke Maternal Grandmother     Heart attack Maternal Grandfather     Heart disease Maternal Grandfather     Heart attack Paternal Grandmother 54    Sleep apnea Paternal Grandmother     Heart disease Paternal Grandmother     Hypertension Paternal Grandmother     Obesity Paternal Grandmother     Rheum arthritis Neg Hx     Osteoarthritis Neg Hx     Asthma Neg Hx     Heart failure Neg Hx     Hyperlipidemia Neg Hx     Migraines Neg Hx     Rashes / Skin problems Neg Hx     Seizures Neg Hx     Thyroid disease Neg Hx     Breast cancer Neg Hx        Social History     Socioeconomic History    Marital status:    Tobacco Use    Smoking status: Former     Packs/day: 0.50     Years: 10.00     Pack years: 5.00     Types: Cigarettes     Quit date: 1/1/2013     Years since quitting: 10.7    Smokeless tobacco: Never    Tobacco comments:     Is around secondhand smoke occasionally in car but not in house.  smokes - not in house or cars   Vaping Use    Vaping Use: Never used   Substance and Sexual Activity    Alcohol use: No     Comment: on occassion socially    Drug use: No    Sexual activity: Defer     Birth control/protection: Surgical           Objective   Physical Exam  Vitals and nursing note reviewed.   Constitutional:       General: She is not in acute distress.     Appearance: She is well-developed. She is not diaphoretic.   HENT:      Head: Normocephalic and atraumatic.      Right Ear: External ear normal.      Left Ear: External ear normal.      Nose: Nose normal.   Eyes:      Conjunctiva/sclera: Conjunctivae normal.      Pupils: Pupils are equal, round, and reactive to light.   Neck:      Vascular: No JVD.      Trachea: No tracheal deviation.   Cardiovascular:      Rate and Rhythm: Normal rate and regular rhythm.      Heart sounds: Normal heart sounds. No murmur heard.  Pulmonary:      Effort: Pulmonary effort is normal. No respiratory distress.       Breath sounds: Normal breath sounds. No wheezing.   Abdominal:      General: Bowel sounds are normal.      Palpations: Abdomen is soft.      Tenderness: There is generalized no abdominal tenderness.   Musculoskeletal:         General: No deformity. Normal range of motion.      Cervical back: Normal range of motion and neck supple.   Skin:     General: Skin is warm and dry.      Capillary Refill: Capillary refill takes 2 to 3 seconds.      Coloration: Skin is not pale.      Findings: No erythema or rash.   Neurological:      Mental Status: She is alert and oriented to person, place, and time.      Cranial Nerves: No cranial nerve deficit.   Psychiatric:         Behavior: Behavior normal.         Thought Content: Thought content normal.       Procedures           ED Course  ED Course as of 09/15/23 0630   Fri Sep 15, 2023   0628 CT Abdomen Pelvis Stone Protocol []      ED Course User Index  [] An Navarrete APRN                                           Medical Decision Making  Problems Addressed:  Colitis: complicated acute illness or injury    Amount and/or Complexity of Data Reviewed  Labs: ordered.  Radiology: ordered. Decision-making details documented in ED Course.    Risk  Prescription drug management.        Final diagnoses:   Colitis       ED Disposition  ED Disposition       ED Disposition   Discharge    Condition   Stable    Comment   --               PATIENT CONNECTION - [place]  See Provider List  [facility] [id number]  [phone]  Schedule an appointment as soon as possible for a visit in 2 days  If symptoms worsen         Medication List        New Prescriptions      amoxicillin-clavulanate 875-125 MG per tablet  Commonly known as: AUGMENTIN  Take 1 tablet by mouth 2 (Two) Times a Day for 10 days.     dicyclomine 10 MG capsule  Commonly known as: BENTYL  Take 1 capsule by mouth 3 (Three) Times a Day As Needed for Abdominal Cramping for up to 5 days.               Where to Get Your  Medications        These medications were sent to [facility] [id number] - [place] [address] - [phone]  - [phone] FX  [address] [place] [address]      Phone: [phone]   amoxicillin-clavulanate 875-125 MG per tablet  dicyclomine 10 MG capsule            An Navarrete, APRN  09/15/23 0630

## 2023-11-09 ENCOUNTER — TRANSCRIBE ORDERS (OUTPATIENT)
Dept: ADMINISTRATIVE | Facility: HOSPITAL | Age: 53
End: 2023-11-09
Payer: COMMERCIAL

## 2023-11-09 DIAGNOSIS — M25.562 LEFT KNEE PAIN, UNSPECIFIED CHRONICITY: Primary | ICD-10-CM

## 2023-11-10 ENCOUNTER — HOSPITAL ENCOUNTER (OUTPATIENT)
Facility: HOSPITAL | Age: 53
Discharge: HOME | End: 2023-11-10
Admitting: NURSE PRACTITIONER
Payer: COMMERCIAL

## 2023-11-10 DIAGNOSIS — M25.562 LEFT KNEE PAIN, UNSPECIFIED CHRONICITY: ICD-10-CM

## 2023-11-10 PROCEDURE — 73700 CT LOWER EXTREMITY W/O DYE: CPT

## 2024-01-29 ENCOUNTER — HOSPITAL ENCOUNTER (INPATIENT)
Facility: HOSPITAL | Age: 54
LOS: 2 days | Discharge: HOME | DRG: 885 | End: 2024-01-31
Attending: PSYCHIATRY & NEUROLOGY | Admitting: PSYCHIATRY & NEUROLOGY
Payer: COMMERCIAL

## 2024-01-29 ENCOUNTER — HOSPITAL ENCOUNTER (EMERGENCY)
Facility: HOSPITAL | Age: 54
Discharge: ANOTHER HOSPITAL | DRG: 885 | End: 2024-01-29
Attending: EMERGENCY MEDICINE | Admitting: EMERGENCY MEDICINE
Payer: COMMERCIAL

## 2024-01-29 VITALS
WEIGHT: 192 LBS | SYSTOLIC BLOOD PRESSURE: 136 MMHG | DIASTOLIC BLOOD PRESSURE: 81 MMHG | HEIGHT: 62 IN | OXYGEN SATURATION: 100 % | TEMPERATURE: 97.5 F | BODY MASS INDEX: 35.33 KG/M2 | HEART RATE: 81 BPM | RESPIRATION RATE: 18 BRPM

## 2024-01-29 DIAGNOSIS — R45.850 HOMICIDAL IDEATION: Primary | ICD-10-CM

## 2024-01-29 LAB
ALBUMIN SERPL-MCNC: 4 G/DL (ref 3.5–5.2)
ALBUMIN/GLOB SERPL: 1.2 G/DL
ALP SERPL-CCNC: 116 U/L (ref 39–117)
ALT SERPL W P-5'-P-CCNC: 13 U/L (ref 1–33)
AMPHET+METHAMPHET UR QL: NEGATIVE
AMPHETAMINES UR QL: NEGATIVE
ANION GAP SERPL CALCULATED.3IONS-SCNC: 9.7 MMOL/L (ref 5–15)
AST SERPL-CCNC: 15 U/L (ref 1–32)
BARBITURATES UR QL SCN: NEGATIVE
BASOPHILS # BLD AUTO: 0.06 10*3/MM3 (ref 0–0.2)
BASOPHILS NFR BLD AUTO: 0.8 % (ref 0–1.5)
BENZODIAZ UR QL SCN: NEGATIVE
BILIRUB SERPL-MCNC: 0.3 MG/DL (ref 0–1.2)
BILIRUB UR QL STRIP: NEGATIVE
BUN SERPL-MCNC: 10 MG/DL (ref 6–20)
BUN/CREAT SERPL: 14.5 (ref 7–25)
BUPRENORPHINE SERPL-MCNC: NEGATIVE NG/ML
CALCIUM SPEC-SCNC: 8.7 MG/DL (ref 8.6–10.5)
CANNABINOIDS SERPL QL: NEGATIVE
CHLORIDE SERPL-SCNC: 107 MMOL/L (ref 98–107)
CLARITY UR: CLEAR
CO2 SERPL-SCNC: 22.3 MMOL/L (ref 22–29)
COCAINE UR QL: NEGATIVE
COLOR UR: YELLOW
CREAT SERPL-MCNC: 0.69 MG/DL (ref 0.57–1)
DEPRECATED RDW RBC AUTO: 41.6 FL (ref 37–54)
EGFRCR SERPLBLD CKD-EPI 2021: 105.2 ML/MIN/1.73
EOSINOPHIL # BLD AUTO: 0.48 10*3/MM3 (ref 0–0.4)
EOSINOPHIL NFR BLD AUTO: 6.5 % (ref 0.3–6.2)
ERYTHROCYTE [DISTWIDTH] IN BLOOD BY AUTOMATED COUNT: 13.2 % (ref 12.3–15.4)
ETHANOL BLD-MCNC: <10 MG/DL (ref 0–10)
ETHANOL UR QL: <0.01 %
FENTANYL UR-MCNC: NEGATIVE NG/ML
GLOBULIN UR ELPH-MCNC: 3.3 GM/DL
GLUCOSE SERPL-MCNC: 110 MG/DL (ref 65–99)
GLUCOSE UR STRIP-MCNC: NEGATIVE MG/DL
HCT VFR BLD AUTO: 38.1 % (ref 34–46.6)
HGB BLD-MCNC: 13.3 G/DL (ref 12–15.9)
HGB UR QL STRIP.AUTO: NEGATIVE
IMM GRANULOCYTES # BLD AUTO: 0 10*3/MM3 (ref 0–0.05)
IMM GRANULOCYTES NFR BLD AUTO: 0 % (ref 0–0.5)
KETONES UR QL STRIP: ABNORMAL
LEUKOCYTE ESTERASE UR QL STRIP.AUTO: NEGATIVE
LYMPHOCYTES # BLD AUTO: 2.67 10*3/MM3 (ref 0.7–3.1)
LYMPHOCYTES NFR BLD AUTO: 36.2 % (ref 19.6–45.3)
MAGNESIUM SERPL-MCNC: 2 MG/DL (ref 1.6–2.6)
MCH RBC QN AUTO: 30.2 PG (ref 26.6–33)
MCHC RBC AUTO-ENTMCNC: 34.9 G/DL (ref 31.5–35.7)
MCV RBC AUTO: 86.6 FL (ref 79–97)
METHADONE UR QL SCN: NEGATIVE
MONOCYTES # BLD AUTO: 0.56 10*3/MM3 (ref 0.1–0.9)
MONOCYTES NFR BLD AUTO: 7.6 % (ref 5–12)
NEUTROPHILS NFR BLD AUTO: 3.61 10*3/MM3 (ref 1.7–7)
NEUTROPHILS NFR BLD AUTO: 48.9 % (ref 42.7–76)
NITRITE UR QL STRIP: NEGATIVE
NRBC BLD AUTO-RTO: 0 /100 WBC (ref 0–0.2)
OPIATES UR QL: NEGATIVE
OXYCODONE UR QL SCN: NEGATIVE
PCP UR QL SCN: NEGATIVE
PH UR STRIP.AUTO: 7 [PH] (ref 5–8)
PLATELET # BLD AUTO: 357 10*3/MM3 (ref 140–450)
PMV BLD AUTO: 10.5 FL (ref 6–12)
POTASSIUM SERPL-SCNC: 3.5 MMOL/L (ref 3.5–5.2)
PROT SERPL-MCNC: 7.3 G/DL (ref 6–8.5)
PROT UR QL STRIP: NEGATIVE
RBC # BLD AUTO: 4.4 10*6/MM3 (ref 3.77–5.28)
SODIUM SERPL-SCNC: 139 MMOL/L (ref 136–145)
SP GR UR STRIP: 1.02 (ref 1–1.03)
TRICYCLICS UR QL SCN: NEGATIVE
UROBILINOGEN UR QL STRIP: ABNORMAL
WBC NRBC COR # BLD AUTO: 7.38 10*3/MM3 (ref 3.4–10.8)

## 2024-01-29 PROCEDURE — 83735 ASSAY OF MAGNESIUM: CPT | Performed by: PHYSICIAN ASSISTANT

## 2024-01-29 PROCEDURE — 85025 COMPLETE CBC W/AUTO DIFF WBC: CPT | Performed by: PHYSICIAN ASSISTANT

## 2024-01-29 PROCEDURE — 81003 URINALYSIS AUTO W/O SCOPE: CPT | Performed by: PHYSICIAN ASSISTANT

## 2024-01-29 PROCEDURE — 99285 EMERGENCY DEPT VISIT HI MDM: CPT

## 2024-01-29 PROCEDURE — 93005 ELECTROCARDIOGRAM TRACING: CPT | Performed by: PSYCHIATRY & NEUROLOGY

## 2024-01-29 PROCEDURE — 80053 COMPREHEN METABOLIC PANEL: CPT | Performed by: PHYSICIAN ASSISTANT

## 2024-01-29 PROCEDURE — 93010 ELECTROCARDIOGRAM REPORT: CPT | Performed by: INTERNAL MEDICINE

## 2024-01-29 PROCEDURE — 82077 ASSAY SPEC XCP UR&BREATH IA: CPT | Performed by: PHYSICIAN ASSISTANT

## 2024-01-29 PROCEDURE — 36415 COLL VENOUS BLD VENIPUNCTURE: CPT

## 2024-01-29 PROCEDURE — 80307 DRUG TEST PRSMV CHEM ANLYZR: CPT | Performed by: PHYSICIAN ASSISTANT

## 2024-01-29 RX ORDER — PANTOPRAZOLE SODIUM 40 MG/1
40 TABLET, DELAYED RELEASE ORAL DAILY
Status: DISCONTINUED | OUTPATIENT
Start: 2024-01-30 | End: 2024-01-31 | Stop reason: HOSPADM

## 2024-01-29 RX ORDER — ECHINACEA PURPUREA EXTRACT 125 MG
2 TABLET ORAL AS NEEDED
Status: DISCONTINUED | OUTPATIENT
Start: 2024-01-29 | End: 2024-01-31 | Stop reason: HOSPADM

## 2024-01-29 RX ORDER — ESCITALOPRAM OXALATE 10 MG/1
10 TABLET ORAL DAILY
COMMUNITY
End: 2024-01-31 | Stop reason: HOSPADM

## 2024-01-29 RX ORDER — ATORVASTATIN CALCIUM 10 MG/1
10 TABLET, FILM COATED ORAL DAILY
COMMUNITY

## 2024-01-29 RX ORDER — TRAZODONE HYDROCHLORIDE 50 MG/1
50 TABLET ORAL NIGHTLY PRN
Status: DISCONTINUED | OUTPATIENT
Start: 2024-01-29 | End: 2024-01-31 | Stop reason: HOSPADM

## 2024-01-29 RX ORDER — RANOLAZINE 500 MG/1
500 TABLET, EXTENDED RELEASE ORAL 2 TIMES DAILY
COMMUNITY

## 2024-01-29 RX ORDER — HYDROXYZINE HYDROCHLORIDE 10 MG/1
10 TABLET, FILM COATED ORAL 3 TIMES DAILY PRN
Status: CANCELLED | OUTPATIENT
Start: 2024-01-29

## 2024-01-29 RX ORDER — RANOLAZINE 500 MG/1
500 TABLET, EXTENDED RELEASE ORAL 2 TIMES DAILY
Status: DISCONTINUED | OUTPATIENT
Start: 2024-01-29 | End: 2024-01-31 | Stop reason: HOSPADM

## 2024-01-29 RX ORDER — ESCITALOPRAM OXALATE 10 MG/1
10 TABLET ORAL DAILY
Status: CANCELLED | OUTPATIENT
Start: 2024-01-29

## 2024-01-29 RX ORDER — MONTELUKAST SODIUM 10 MG/1
10 TABLET ORAL NIGHTLY
Status: CANCELLED | OUTPATIENT
Start: 2024-01-29

## 2024-01-29 RX ORDER — BENZTROPINE MESYLATE 1 MG/1
2 TABLET ORAL ONCE AS NEEDED
Status: DISCONTINUED | OUTPATIENT
Start: 2024-01-29 | End: 2024-01-31 | Stop reason: HOSPADM

## 2024-01-29 RX ORDER — ACETAMINOPHEN 325 MG/1
650 TABLET ORAL EVERY 6 HOURS PRN
Status: DISCONTINUED | OUTPATIENT
Start: 2024-01-29 | End: 2024-01-31 | Stop reason: HOSPADM

## 2024-01-29 RX ORDER — LISINOPRIL 30 MG/1
30 TABLET ORAL DAILY
COMMUNITY

## 2024-01-29 RX ORDER — ATORVASTATIN CALCIUM 10 MG/1
10 TABLET, FILM COATED ORAL DAILY
Status: DISCONTINUED | OUTPATIENT
Start: 2024-01-30 | End: 2024-01-31 | Stop reason: HOSPADM

## 2024-01-29 RX ORDER — BENZONATATE 100 MG/1
100 CAPSULE ORAL 3 TIMES DAILY PRN
Status: DISCONTINUED | OUTPATIENT
Start: 2024-01-29 | End: 2024-01-31 | Stop reason: HOSPADM

## 2024-01-29 RX ORDER — BENZTROPINE MESYLATE 1 MG/ML
1 INJECTION INTRAMUSCULAR; INTRAVENOUS ONCE AS NEEDED
Status: DISCONTINUED | OUTPATIENT
Start: 2024-01-29 | End: 2024-01-31 | Stop reason: HOSPADM

## 2024-01-29 RX ORDER — ESCITALOPRAM OXALATE 10 MG/1
10 TABLET ORAL DAILY
Status: DISCONTINUED | OUTPATIENT
Start: 2024-01-30 | End: 2024-01-30

## 2024-01-29 RX ORDER — ASPIRIN 81 MG/1
81 TABLET ORAL DAILY
Status: CANCELLED | OUTPATIENT
Start: 2024-01-29

## 2024-01-29 RX ORDER — CARVEDILOL 3.12 MG/1
3.12 TABLET ORAL 2 TIMES DAILY WITH MEALS
Status: DISCONTINUED | OUTPATIENT
Start: 2024-01-29 | End: 2024-01-31 | Stop reason: HOSPADM

## 2024-01-29 RX ORDER — LISINOPRIL 10 MG/1
30 TABLET ORAL DAILY
Status: CANCELLED | OUTPATIENT
Start: 2024-01-29

## 2024-01-29 RX ORDER — ATORVASTATIN CALCIUM 10 MG/1
10 TABLET, FILM COATED ORAL DAILY
Status: CANCELLED | OUTPATIENT
Start: 2024-01-29

## 2024-01-29 RX ORDER — LISINOPRIL 10 MG/1
30 TABLET ORAL DAILY
Status: DISCONTINUED | OUTPATIENT
Start: 2024-01-30 | End: 2024-01-31 | Stop reason: HOSPADM

## 2024-01-29 RX ORDER — ALUMINA, MAGNESIA, AND SIMETHICONE 2400; 2400; 240 MG/30ML; MG/30ML; MG/30ML
15 SUSPENSION ORAL EVERY 6 HOURS PRN
Status: DISCONTINUED | OUTPATIENT
Start: 2024-01-29 | End: 2024-01-31 | Stop reason: HOSPADM

## 2024-01-29 RX ORDER — HYDROXYZINE HYDROCHLORIDE 10 MG/1
10 TABLET, FILM COATED ORAL 3 TIMES DAILY PRN
Status: DISCONTINUED | OUTPATIENT
Start: 2024-01-29 | End: 2024-01-31 | Stop reason: HOSPADM

## 2024-01-29 RX ORDER — PANTOPRAZOLE SODIUM 40 MG/1
40 TABLET, DELAYED RELEASE ORAL
Status: CANCELLED | OUTPATIENT
Start: 2024-01-29

## 2024-01-29 RX ORDER — CARVEDILOL 3.12 MG/1
3.12 TABLET ORAL 2 TIMES DAILY WITH MEALS
Status: CANCELLED | OUTPATIENT
Start: 2024-01-29

## 2024-01-29 RX ORDER — NICOTINE 21 MG/24HR
1 PATCH, TRANSDERMAL 24 HOURS TRANSDERMAL
Status: DISCONTINUED | OUTPATIENT
Start: 2024-01-29 | End: 2024-01-31 | Stop reason: HOSPADM

## 2024-01-29 RX ORDER — CARVEDILOL 3.12 MG/1
3.12 TABLET ORAL 2 TIMES DAILY WITH MEALS
COMMUNITY

## 2024-01-29 RX ORDER — RANOLAZINE 500 MG/1
500 TABLET, EXTENDED RELEASE ORAL 2 TIMES DAILY
Status: CANCELLED | OUTPATIENT
Start: 2024-01-29

## 2024-01-29 RX ORDER — FAMOTIDINE 20 MG/1
20 TABLET, FILM COATED ORAL 2 TIMES DAILY PRN
Status: DISCONTINUED | OUTPATIENT
Start: 2024-01-29 | End: 2024-01-31 | Stop reason: HOSPADM

## 2024-01-29 RX ORDER — MONTELUKAST SODIUM 10 MG/1
10 TABLET ORAL NIGHTLY
Status: DISCONTINUED | OUTPATIENT
Start: 2024-01-29 | End: 2024-01-31 | Stop reason: HOSPADM

## 2024-01-29 RX ORDER — ASPIRIN 81 MG/1
81 TABLET ORAL DAILY
Status: DISCONTINUED | OUTPATIENT
Start: 2024-01-29 | End: 2024-01-31 | Stop reason: HOSPADM

## 2024-01-29 RX ORDER — ASPIRIN 81 MG/1
81 TABLET ORAL DAILY
COMMUNITY

## 2024-01-29 RX ORDER — ONDANSETRON 4 MG/1
4 TABLET, ORALLY DISINTEGRATING ORAL EVERY 6 HOURS PRN
Status: DISCONTINUED | OUTPATIENT
Start: 2024-01-29 | End: 2024-01-31 | Stop reason: HOSPADM

## 2024-01-29 RX ORDER — HYDROXYZINE HYDROCHLORIDE 10 MG/1
10 TABLET, FILM COATED ORAL 3 TIMES DAILY PRN
COMMUNITY

## 2024-01-29 RX ORDER — HYDROXYZINE 50 MG/1
50 TABLET, FILM COATED ORAL EVERY 6 HOURS PRN
Status: DISCONTINUED | OUTPATIENT
Start: 2024-01-29 | End: 2024-01-31 | Stop reason: HOSPADM

## 2024-01-29 RX ORDER — LOPERAMIDE HYDROCHLORIDE 2 MG/1
2 CAPSULE ORAL
Status: DISCONTINUED | OUTPATIENT
Start: 2024-01-29 | End: 2024-01-31 | Stop reason: HOSPADM

## 2024-01-29 RX ADMIN — ASPIRIN 81 MG: 81 TABLET, COATED ORAL at 19:51

## 2024-01-29 RX ADMIN — MONTELUKAST SODIUM 10 MG: 10 TABLET, COATED ORAL at 20:57

## 2024-01-29 RX ADMIN — TRAZODONE HYDROCHLORIDE 50 MG: 50 TABLET ORAL at 20:56

## 2024-01-29 RX ADMIN — RANOLAZINE 500 MG: 500 TABLET, FILM COATED, EXTENDED RELEASE ORAL at 20:57

## 2024-01-29 RX ADMIN — HYDROXYZINE HYDROCHLORIDE 50 MG: 50 TABLET ORAL at 20:56

## 2024-01-29 RX ADMIN — CARVEDILOL 3.12 MG: 3.12 TABLET, FILM COATED ORAL at 20:57

## 2024-01-29 RX ADMIN — ACETAMINOPHEN 650 MG: 325 TABLET ORAL at 19:51

## 2024-01-29 NOTE — NURSING NOTE
Duty to warn unable to be completed at this time.   Per Dr. Soto therapist to follow up with details of HI upon admission. Duty to warn to be completed if information becomes available.

## 2024-01-29 NOTE — NURSING NOTE
"Presented to ED for evaluation upon the recommendation of her therapist, Lizbeth Cardoza. States that she has thoughts to kill the female that her  cheated on her with. States \"it is just thoughts.\" Declines to provide any further details. Denies SI. One prior inpatient admission 14-15 years ago. Reports that she has lost 30 pounds over the last 2 months r/t anxiety and depression due to her martial discord. Had a L knee replacement in December. Has been going to physical therapy twice weekly. LIves with her , Lane, and plans to return home upon discharge.   "

## 2024-01-29 NOTE — PLAN OF CARE
Goal Outcome Evaluation:  Plan of Care Reviewed With: patient  Patient Agreement with Plan of Care: agrees         New admission.  Care plan initiated.

## 2024-01-29 NOTE — NURSING NOTE
Patient presents to intake reporting homicidal ideation. She reports she wants to kill the woman her spouse cheated on her with but will not disclose any other information or specific plans. Patient denies si and avh. Patient reports she has lost 30 lbs since November and that she doesn't sleep. She rates anxiety 8/10 and depression 10/10.

## 2024-01-30 LAB
HAV IGM SERPL QL IA: NORMAL
HBV CORE IGM SERPL QL IA: NORMAL
HBV SURFACE AG SERPL QL IA: NORMAL
HCV AB SER DONR QL: NORMAL
QT INTERVAL: 398 MS
QTC INTERVAL: 453 MS

## 2024-01-30 PROCEDURE — 99223 1ST HOSP IP/OBS HIGH 75: CPT | Performed by: PSYCHIATRY & NEUROLOGY

## 2024-01-30 PROCEDURE — 80074 ACUTE HEPATITIS PANEL: CPT | Performed by: PSYCHIATRY & NEUROLOGY

## 2024-01-30 RX ORDER — FLUOXETINE HYDROCHLORIDE 20 MG/1
20 CAPSULE ORAL DAILY
Status: DISCONTINUED | OUTPATIENT
Start: 2024-01-30 | End: 2024-01-31 | Stop reason: HOSPADM

## 2024-01-30 RX ORDER — OLANZAPINE 5 MG/1
2.5 TABLET ORAL NIGHTLY
Status: DISCONTINUED | OUTPATIENT
Start: 2024-01-30 | End: 2024-01-31 | Stop reason: HOSPADM

## 2024-01-30 RX ADMIN — ATORVASTATIN CALCIUM 10 MG: 10 TABLET, FILM COATED ORAL at 08:52

## 2024-01-30 RX ADMIN — ESTRADIOL 1 PATCH: 0.03 PATCH TRANSDERMAL at 10:10

## 2024-01-30 RX ADMIN — LISINOPRIL 30 MG: 10 TABLET ORAL at 08:52

## 2024-01-30 RX ADMIN — HYDROXYZINE HYDROCHLORIDE 50 MG: 50 TABLET ORAL at 20:50

## 2024-01-30 RX ADMIN — RANOLAZINE 500 MG: 500 TABLET, FILM COATED, EXTENDED RELEASE ORAL at 08:53

## 2024-01-30 RX ADMIN — PANTOPRAZOLE SODIUM 40 MG: 40 TABLET, DELAYED RELEASE ORAL at 08:52

## 2024-01-30 RX ADMIN — ASPIRIN 81 MG: 81 TABLET, COATED ORAL at 08:52

## 2024-01-30 RX ADMIN — TRAZODONE HYDROCHLORIDE 50 MG: 50 TABLET ORAL at 20:50

## 2024-01-30 RX ADMIN — OLANZAPINE 2.5 MG: 5 TABLET, FILM COATED ORAL at 20:48

## 2024-01-30 RX ADMIN — MONTELUKAST SODIUM 10 MG: 10 TABLET, COATED ORAL at 20:48

## 2024-01-30 RX ADMIN — RANOLAZINE 500 MG: 500 TABLET, FILM COATED, EXTENDED RELEASE ORAL at 20:48

## 2024-01-30 RX ADMIN — CARVEDILOL 3.12 MG: 3.12 TABLET, FILM COATED ORAL at 17:22

## 2024-01-30 RX ADMIN — FLUOXETINE HYDROCHLORIDE 20 MG: 20 CAPSULE ORAL at 10:10

## 2024-01-30 RX ADMIN — CARVEDILOL 3.12 MG: 3.12 TABLET, FILM COATED ORAL at 08:53

## 2024-01-30 RX ADMIN — ESCITALOPRAM 10 MG: 10 TABLET, FILM COATED ORAL at 08:52

## 2024-01-30 NOTE — DISCHARGE INSTR - APPOINTMENTS
[address] [address], [place] [address]  [phone]    You have an appointment with Lizbeth on February 5 2024 at 2:30pm            [address] [place] [id number] [phone]    February 6 2024 at 9:00am
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
## 2024-01-30 NOTE — PLAN OF CARE
"  Problem: Adult Behavioral Health Plan of Care  Goal: Plan of Care Review  Outcome: Ongoing, Progressing  Flowsheets  Taken 1/30/2024 1331  Plan of Care Reviewed With: patient  Patient Agreement with Plan of Care: agrees  Outcome Evaluation: Patient has been calm and cooperative this shift with staff. Patient denies SI/AVH. When ask about HI she states \"not right now.\"  Patient rates anxiety 0/10 and depression 4/10. Pt is A&O x4. Reports sleep and appetite good  Taken 1/30/2024 0917  Plan of Care Reviewed With: patient  Patient Agreement with Plan of Care: agrees   Goal Outcome Evaluation:  Plan of Care Reviewed With: patient  Patient Agreement with Plan of Care: agrees        Outcome Evaluation: Patient has been calm and cooperative this shift with staff. Patient denies SI/AVH. When ask about HI she states \"not right now.\"  Patient rates anxiety 0/10 and depression 4/10. Pt is A&O x4. Reports sleep and appetite good                               "

## 2024-01-30 NOTE — PLAN OF CARE
"Goal Outcome Evaluation:  Plan of Care Reviewed With: patient  Patient Agreement with Plan of Care: agrees     Progress: no change  Outcome Evaluation: Pt reports anxiety and depression 7/10. Pt reports HI, stated she \"would rather not say\" when asked who she was homicidal toward. Pt then states that it is \"nobody in particular\". Pt denies SI/AVH. Pt reports poor sleep and appetite.                               "

## 2024-01-31 VITALS
SYSTOLIC BLOOD PRESSURE: 127 MMHG | BODY MASS INDEX: 35.81 KG/M2 | HEIGHT: 62 IN | DIASTOLIC BLOOD PRESSURE: 81 MMHG | WEIGHT: 194.6 LBS | RESPIRATION RATE: 18 BRPM | HEART RATE: 65 BPM | OXYGEN SATURATION: 98 % | TEMPERATURE: 95.5 F

## 2024-01-31 PROBLEM — F43.10 POST TRAUMATIC STRESS DISORDER (PTSD): Status: ACTIVE | Noted: 2024-01-31

## 2024-01-31 PROBLEM — F43.25 ADJUSTMENT DISORDER WITH MIXED DISTURBANCE OF EMOTIONS AND CONDUCT: Status: ACTIVE | Noted: 2024-01-31

## 2024-01-31 PROBLEM — F31.9 BIPOLAR DISORDER, UNSPECIFIED: Status: ACTIVE | Noted: 2024-01-31

## 2024-01-31 PROBLEM — R45.850 HOMICIDAL IDEATION: Status: RESOLVED | Noted: 2024-01-29 | Resolved: 2024-01-31

## 2024-01-31 PROCEDURE — 99239 HOSP IP/OBS DSCHRG MGMT >30: CPT | Performed by: PSYCHIATRY & NEUROLOGY

## 2024-01-31 RX ORDER — FLUOXETINE HYDROCHLORIDE 20 MG/1
20 CAPSULE ORAL DAILY
Qty: 30 CAPSULE | Refills: 0 | Status: SHIPPED | OUTPATIENT
Start: 2024-02-01

## 2024-01-31 RX ORDER — OLANZAPINE AND SAMIDORPHAN L-MALATE 5; 10 MG/1; MG/1
1 TABLET, FILM COATED ORAL NIGHTLY
Qty: 30 TABLET | Refills: 0 | Status: SHIPPED | OUTPATIENT
Start: 2024-01-31

## 2024-01-31 RX ADMIN — RANOLAZINE 500 MG: 500 TABLET, FILM COATED, EXTENDED RELEASE ORAL at 08:25

## 2024-01-31 RX ADMIN — CARVEDILOL 3.12 MG: 3.12 TABLET, FILM COATED ORAL at 08:25

## 2024-01-31 RX ADMIN — ASPIRIN 81 MG: 81 TABLET, COATED ORAL at 08:25

## 2024-01-31 RX ADMIN — PANTOPRAZOLE SODIUM 40 MG: 40 TABLET, DELAYED RELEASE ORAL at 08:25

## 2024-01-31 RX ADMIN — LISINOPRIL 30 MG: 10 TABLET ORAL at 08:25

## 2024-01-31 RX ADMIN — FLUOXETINE HYDROCHLORIDE 20 MG: 20 CAPSULE ORAL at 08:25

## 2024-01-31 RX ADMIN — ACETAMINOPHEN 650 MG: 325 TABLET ORAL at 02:04

## 2024-01-31 RX ADMIN — OFLOXACIN 50000 UNITS: 300 TABLET, COATED ORAL at 08:25

## 2024-01-31 RX ADMIN — ATORVASTATIN CALCIUM 10 MG: 10 TABLET, FILM COATED ORAL at 08:25
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
## 2024-01-31 NOTE — PROGRESS NOTES
Discharge Planning Assessment  [facility]     Patient Name: Shaun Morales  MRN: [id number]  Today's Date: 1/31/2024    Admit Date: 1/29/2024    Patient is being discharged home today. She denies SI, HI, and AVH. She reports that getting some rest has made her feel more confident in making needed life changes. She feels that she and her  should separate, and that this will benefit both of them. She reports that she can safely talk to her  about this when she returns home. She has the support of her daughter, Cornelia, who also knows her plans. This therapist spoke with Cornelia today and completed safety planning. This therapist also spoke with Patient's spouse, Lane, and completed safety planning. They were advised to secure all weapons, harmful objects, and medications in the home. Patient was also educated about the crisis hotline, when to call 911 or present to the nearest emergency room.     Patient has aftercare with [facility].    VIPUL Choudhury
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
## 2024-01-31 NOTE — PLAN OF CARE
Goal Outcome Evaluation:  Plan of Care Reviewed With: patient  Patient Agreement with Plan of Care: agrees     Progress: improving  Outcome Evaluation: Pt reports anxiety 2/10 and depression 4/10. Pt denies SI/HI/AVH. Pt reports good sleep and appetite.

## 2024-02-27 RX ORDER — OLANZAPINE AND SAMIDORPHAN L-MALATE 5; 10 MG/1; MG/1
1 TABLET, FILM COATED ORAL NIGHTLY
Qty: 30 TABLET | Refills: 0 | Status: SHIPPED | OUTPATIENT
Start: 2024-02-27

## 2024-02-27 NOTE — TELEPHONE ENCOUNTER
Patient requesting refill on prescription until care can be established. States PCP will not write for her.

## 2024-03-08 ENCOUNTER — APPOINTMENT (OUTPATIENT)
Dept: GENERAL RADIOLOGY | Facility: HOSPITAL | Age: 54
End: 2024-03-08
Payer: COMMERCIAL

## 2024-03-08 ENCOUNTER — HOSPITAL ENCOUNTER (EMERGENCY)
Facility: HOSPITAL | Age: 54
Discharge: HOME | End: 2024-03-08
Attending: STUDENT IN AN ORGANIZED HEALTH CARE EDUCATION/TRAINING PROGRAM
Payer: COMMERCIAL

## 2024-03-08 ENCOUNTER — APPOINTMENT (OUTPATIENT)
Dept: CT IMAGING | Facility: HOSPITAL | Age: 54
End: 2024-03-08
Payer: COMMERCIAL

## 2024-03-08 VITALS
HEART RATE: 64 BPM | SYSTOLIC BLOOD PRESSURE: 137 MMHG | TEMPERATURE: 97.7 F | HEIGHT: 62 IN | BODY MASS INDEX: 35.51 KG/M2 | WEIGHT: 193 LBS | OXYGEN SATURATION: 100 % | RESPIRATION RATE: 16 BRPM | DIASTOLIC BLOOD PRESSURE: 89 MMHG

## 2024-03-08 DIAGNOSIS — I71.21 ANEURYSM OF ASCENDING AORTA WITHOUT RUPTURE: ICD-10-CM

## 2024-03-08 DIAGNOSIS — R07.9 CHEST PAIN, UNSPECIFIED TYPE: Primary | ICD-10-CM

## 2024-03-08 LAB
ALBUMIN SERPL-MCNC: 4.8 G/DL (ref 3.5–5.2)
ALBUMIN/GLOB SERPL: 1.3 G/DL
ALP SERPL-CCNC: 122 U/L (ref 39–117)
ALT SERPL W P-5'-P-CCNC: 13 U/L (ref 1–33)
AMYLASE SERPL-CCNC: 44 U/L (ref 28–100)
ANION GAP SERPL CALCULATED.3IONS-SCNC: 10.6 MMOL/L (ref 5–15)
APTT PPP: 33.7 SECONDS (ref 26.5–34.5)
AST SERPL-CCNC: 17 U/L (ref 1–32)
BASOPHILS # BLD AUTO: 0.06 10*3/MM3 (ref 0–0.2)
BASOPHILS NFR BLD AUTO: 0.8 % (ref 0–1.5)
BILIRUB SERPL-MCNC: 0.4 MG/DL (ref 0–1.2)
BUN SERPL-MCNC: 9 MG/DL (ref 6–20)
BUN/CREAT SERPL: 14.5 (ref 7–25)
CALCIUM SPEC-SCNC: 9.6 MG/DL (ref 8.6–10.5)
CHLORIDE SERPL-SCNC: 105 MMOL/L (ref 98–107)
CO2 SERPL-SCNC: 24.4 MMOL/L (ref 22–29)
CREAT SERPL-MCNC: 0.62 MG/DL (ref 0.57–1)
D DIMER PPP FEU-MCNC: 2.06 MCGFEU/ML (ref 0–0.51)
DEPRECATED RDW RBC AUTO: 44.6 FL (ref 37–54)
EGFRCR SERPLBLD CKD-EPI 2021: 108 ML/MIN/1.73
EOSINOPHIL # BLD AUTO: 0.46 10*3/MM3 (ref 0–0.4)
EOSINOPHIL NFR BLD AUTO: 6.1 % (ref 0.3–6.2)
ERYTHROCYTE [DISTWIDTH] IN BLOOD BY AUTOMATED COUNT: 13.2 % (ref 12.3–15.4)
GEN 5 2HR TROPONIN T REFLEX: <6 NG/L
GLOBULIN UR ELPH-MCNC: 3.8 GM/DL
GLUCOSE SERPL-MCNC: 135 MG/DL (ref 65–99)
HCT VFR BLD AUTO: 42.7 % (ref 34–46.6)
HGB BLD-MCNC: 14.1 G/DL (ref 12–15.9)
HOLD SPECIMEN: NORMAL
HOLD SPECIMEN: NORMAL
IMM GRANULOCYTES # BLD AUTO: 0.01 10*3/MM3 (ref 0–0.05)
IMM GRANULOCYTES NFR BLD AUTO: 0.1 % (ref 0–0.5)
INR PPP: 0.99 (ref 0.9–1.1)
LIPASE SERPL-CCNC: 23 U/L (ref 13–60)
LYMPHOCYTES # BLD AUTO: 1.96 10*3/MM3 (ref 0.7–3.1)
LYMPHOCYTES NFR BLD AUTO: 26 % (ref 19.6–45.3)
MAGNESIUM SERPL-MCNC: 2.2 MG/DL (ref 1.6–2.6)
MCH RBC QN AUTO: 30 PG (ref 26.6–33)
MCHC RBC AUTO-ENTMCNC: 33 G/DL (ref 31.5–35.7)
MCV RBC AUTO: 90.9 FL (ref 79–97)
MONOCYTES # BLD AUTO: 0.47 10*3/MM3 (ref 0.1–0.9)
MONOCYTES NFR BLD AUTO: 6.2 % (ref 5–12)
NEUTROPHILS NFR BLD AUTO: 4.58 10*3/MM3 (ref 1.7–7)
NEUTROPHILS NFR BLD AUTO: 60.8 % (ref 42.7–76)
NRBC BLD AUTO-RTO: 0 /100 WBC (ref 0–0.2)
PLATELET # BLD AUTO: 348 10*3/MM3 (ref 140–450)
PMV BLD AUTO: 11.2 FL (ref 6–12)
POTASSIUM SERPL-SCNC: 4 MMOL/L (ref 3.5–5.2)
PROT SERPL-MCNC: 8.6 G/DL (ref 6–8.5)
PROTHROMBIN TIME: 13.6 SECONDS (ref 12.1–14.7)
QT INTERVAL: 396 MS
QTC INTERVAL: 436 MS
RBC # BLD AUTO: 4.7 10*6/MM3 (ref 3.77–5.28)
SODIUM SERPL-SCNC: 140 MMOL/L (ref 136–145)
TROPONIN T DELTA: NORMAL
TROPONIN T SERPL HS-MCNC: 8 NG/L
WBC NRBC COR # BLD AUTO: 7.54 10*3/MM3 (ref 3.4–10.8)
WHOLE BLOOD HOLD COAG: NORMAL
WHOLE BLOOD HOLD SPECIMEN: NORMAL

## 2024-03-08 PROCEDURE — 83690 ASSAY OF LIPASE: CPT | Performed by: PHYSICIAN ASSISTANT

## 2024-03-08 PROCEDURE — 84484 ASSAY OF TROPONIN QUANT: CPT | Performed by: STUDENT IN AN ORGANIZED HEALTH CARE EDUCATION/TRAINING PROGRAM

## 2024-03-08 PROCEDURE — 36415 COLL VENOUS BLD VENIPUNCTURE: CPT

## 2024-03-08 PROCEDURE — 25510000001 IOPAMIDOL PER 1 ML: Performed by: STUDENT IN AN ORGANIZED HEALTH CARE EDUCATION/TRAINING PROGRAM

## 2024-03-08 PROCEDURE — 96374 THER/PROPH/DIAG INJ IV PUSH: CPT

## 2024-03-08 PROCEDURE — 71045 X-RAY EXAM CHEST 1 VIEW: CPT

## 2024-03-08 PROCEDURE — 80053 COMPREHEN METABOLIC PANEL: CPT | Performed by: STUDENT IN AN ORGANIZED HEALTH CARE EDUCATION/TRAINING PROGRAM

## 2024-03-08 PROCEDURE — 85025 COMPLETE CBC W/AUTO DIFF WBC: CPT | Performed by: STUDENT IN AN ORGANIZED HEALTH CARE EDUCATION/TRAINING PROGRAM

## 2024-03-08 PROCEDURE — 71045 X-RAY EXAM CHEST 1 VIEW: CPT | Performed by: RADIOLOGY

## 2024-03-08 PROCEDURE — 83735 ASSAY OF MAGNESIUM: CPT | Performed by: PHYSICIAN ASSISTANT

## 2024-03-08 PROCEDURE — 25010000002 ONDANSETRON PER 1 MG: Performed by: PHYSICIAN ASSISTANT

## 2024-03-08 PROCEDURE — 93010 ELECTROCARDIOGRAM REPORT: CPT | Performed by: INTERNAL MEDICINE

## 2024-03-08 PROCEDURE — 71275 CT ANGIOGRAPHY CHEST: CPT

## 2024-03-08 PROCEDURE — 93005 ELECTROCARDIOGRAM TRACING: CPT | Performed by: STUDENT IN AN ORGANIZED HEALTH CARE EDUCATION/TRAINING PROGRAM

## 2024-03-08 PROCEDURE — 71275 CT ANGIOGRAPHY CHEST: CPT | Performed by: RADIOLOGY

## 2024-03-08 PROCEDURE — 99285 EMERGENCY DEPT VISIT HI MDM: CPT

## 2024-03-08 PROCEDURE — 85730 THROMBOPLASTIN TIME PARTIAL: CPT | Performed by: PHYSICIAN ASSISTANT

## 2024-03-08 PROCEDURE — 85610 PROTHROMBIN TIME: CPT | Performed by: PHYSICIAN ASSISTANT

## 2024-03-08 PROCEDURE — 82150 ASSAY OF AMYLASE: CPT | Performed by: PHYSICIAN ASSISTANT

## 2024-03-08 PROCEDURE — 85379 FIBRIN DEGRADATION QUANT: CPT | Performed by: PHYSICIAN ASSISTANT

## 2024-03-08 RX ORDER — ONDANSETRON 2 MG/ML
4 INJECTION INTRAMUSCULAR; INTRAVENOUS ONCE
Status: COMPLETED | OUTPATIENT
Start: 2024-03-08 | End: 2024-03-08

## 2024-03-08 RX ORDER — ASPIRIN 325 MG
325 TABLET ORAL ONCE
Status: COMPLETED | OUTPATIENT
Start: 2024-03-08 | End: 2024-03-08

## 2024-03-08 RX ORDER — SODIUM CHLORIDE 0.9 % (FLUSH) 0.9 %
10 SYRINGE (ML) INJECTION AS NEEDED
Status: DISCONTINUED | OUTPATIENT
Start: 2024-03-08 | End: 2024-03-08 | Stop reason: HOSPADM

## 2024-03-08 RX ADMIN — ASPIRIN 325 MG: 325 TABLET ORAL at 09:26

## 2024-03-08 RX ADMIN — IOPAMIDOL 80 ML: 755 INJECTION, SOLUTION INTRAVENOUS at 11:14

## 2024-03-08 RX ADMIN — ONDANSETRON 4 MG: 2 INJECTION INTRAMUSCULAR; INTRAVENOUS at 09:31
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
## 2024-03-08 NOTE — DISCHARGE INSTRUCTIONS
Follow-up with your family doctor and your cardiologist in the office at the next available appointment.  You also need to follow-up with cardiothoracic surgery due to the findings of an ascending thoracic aortic aneurysm today.  It measures 4 cm.  Continue taking your home medications as prescribed.  Return to the ED at any time if symptoms change or worsen.

## 2024-03-14 NOTE — PROGRESS NOTES
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 office. Number also available to the 24- hour suicide hotline.    Visit Diagnoses:    ICD-10-CM ICD-9-CM   1. Bipolar disorder, current episode depressed, severe, without psychotic features  F31.4 296.53   2. Medication management  Z79.899 V58.69   3. Adjustment disorder with mixed disturbance of emotions and conduct  F43.25 309.4   4. Post traumatic stress disorder (PTSD)  F43.10 309.81         TREATMENT PLAN/GOALS: Continue supportive psychotherapy efforts and medications as indicated. Treatment and medication options discussed during today's visit. Patient acknowledged and verbally consented to continue with current treatment plan and was educated on the importance of compliance with treatment and follow-up appointments.    MEDICATION ISSUES:  Discussed medication options and treatment plan of prescribed medication as well as the risks, benefits, and side effects including potential falls, possible impaired driving and metabolic adversities among others. Patient is agreeable to call the office with any worsening of symptoms or onset of side effects. Patient is agreeable to call 911 or go to the nearest ER should he/she begin having SI/HI.     MEDS ORDERED DURING VISIT:  New Medications Ordered  This Visit   Medications    OLANZapine-Samidorphan (Lybalvi) 10-10 MG tablet     Sig: Take 1 tablet by mouth Daily.     Dispense:  30 tablet     Refill:  0    FLUoxetine (PROzac) 20 MG capsule     Sig: Take 1 capsule by mouth Daily. Indications: Major Depressive Disorder     Dispense:  30 capsule     Refill:  0    hydrOXYzine (ATARAX) 10 MG tablet     Sig: Take 1 tablet by mouth 3 (Three) Times a Day As Needed for Anxiety. Indications: Feeling Anxious     Dispense:  90 tablet     Refill:  0       Return in about 1 month (around 4/20/2024).  -Increase Lybalvi 10-10 mg tablet take 1 tablet by mouth daily  -Continue fluoxetine 20 mg capsule take 1 capsule by mouth daily  -Continue hydroxyzine 10 mg tablet take 1 tablet by mouth 3 times a day as needed for anxiety  -CBC, CMP, TSH, T4, lipid panel and vitamin B12  Continue psychotherapy       Prognosis: Guarded dependent on medication/follow up and treatment plan compliance.  Functionality: pt showing improvements in important areas of daily functioning.     Short-term goals: Patient will adhere to medication regimen and note continued improvement in symptoms over the next 3 months.   Long-term goals: Patient will be adherent to medication management and psychotherapy with continued improvement in symptoms over the next 6 months.          This document has been electronically signed by PALOMO Bass   March 20, 2024 13:10 EDT    Part of this note may be an electronic transcription/translation of spoken language to printed text using the Dragon Dictation System.

## 2024-03-20 ENCOUNTER — OFFICE VISIT (OUTPATIENT)
Dept: PSYCHIATRY | Facility: CLINIC | Age: 54
End: 2024-03-20
Payer: COMMERCIAL

## 2024-03-20 VITALS
HEART RATE: 62 BPM | BODY MASS INDEX: 36.07 KG/M2 | OXYGEN SATURATION: 100 % | WEIGHT: 196 LBS | HEIGHT: 62 IN | DIASTOLIC BLOOD PRESSURE: 80 MMHG | SYSTOLIC BLOOD PRESSURE: 128 MMHG

## 2024-03-20 DIAGNOSIS — F43.25 ADJUSTMENT DISORDER WITH MIXED DISTURBANCE OF EMOTIONS AND CONDUCT: ICD-10-CM

## 2024-03-20 DIAGNOSIS — Z79.899 MEDICATION MANAGEMENT: ICD-10-CM

## 2024-03-20 DIAGNOSIS — F31.4 BIPOLAR DISORDER, CURRENT EPISODE DEPRESSED, SEVERE, WITHOUT PSYCHOTIC FEATURES: Primary | ICD-10-CM

## 2024-03-20 DIAGNOSIS — F43.10 POST TRAUMATIC STRESS DISORDER (PTSD): ICD-10-CM

## 2024-03-20 LAB
EXTERNAL AMPHETAMINE SCREEN URINE: NEGATIVE
EXTERNAL BENZODIAZEPINE SCREEN URINE: NEGATIVE
EXTERNAL BUPRENORPHINE SCREEN URINE: NEGATIVE
EXTERNAL COCAINE SCREEN URINE: NEGATIVE
EXTERNAL MDMA: NEGATIVE
EXTERNAL METHADONE SCREEN URINE: NEGATIVE
EXTERNAL METHAMPHETAMINE SCREEN URINE: NEGATIVE
EXTERNAL OPIATES SCREEN URINE: NEGATIVE
EXTERNAL OXYCODONE SCREEN URINE: POSITIVE
EXTERNAL THC SCREEN URINE: NEGATIVE

## 2024-03-20 RX ORDER — OLANZAPINE AND SAMIDORPHAN L-MALATE 10; 10 MG/1; MG/1
1 TABLET, FILM COATED ORAL DAILY
Qty: 30 TABLET | Refills: 0 | Status: SHIPPED | OUTPATIENT
Start: 2024-03-20

## 2024-03-20 RX ORDER — FLUOXETINE HYDROCHLORIDE 20 MG/1
20 CAPSULE ORAL DAILY
Qty: 30 CAPSULE | Refills: 0 | Status: SHIPPED | OUTPATIENT
Start: 2024-03-20

## 2024-03-20 RX ORDER — HYDROXYZINE HYDROCHLORIDE 10 MG/1
10 TABLET, FILM COATED ORAL 3 TIMES DAILY PRN
Qty: 90 TABLET | Refills: 0 | Status: SHIPPED | OUTPATIENT
Start: 2024-03-20

## 2024-03-20 RX ORDER — NITROGLYCERIN 0.4 MG/1
TABLET SUBLINGUAL
COMMUNITY
Start: 2024-03-12

## 2024-03-26 ENCOUNTER — LAB (OUTPATIENT)
Dept: LAB | Facility: HOSPITAL | Age: 54
End: 2024-03-26
Payer: COMMERCIAL

## 2024-03-26 LAB
ALBUMIN SERPL-MCNC: 4.2 G/DL (ref 3.5–5.2)
ALBUMIN/GLOB SERPL: 1.2 G/DL
ALP SERPL-CCNC: 113 U/L (ref 39–117)
ALT SERPL W P-5'-P-CCNC: 13 U/L (ref 1–33)
ANION GAP SERPL CALCULATED.3IONS-SCNC: 12.6 MMOL/L (ref 5–15)
AST SERPL-CCNC: 13 U/L (ref 1–32)
BILIRUB SERPL-MCNC: 0.3 MG/DL (ref 0–1.2)
BUN SERPL-MCNC: 13 MG/DL (ref 6–20)
BUN/CREAT SERPL: 19.7 (ref 7–25)
CALCIUM SPEC-SCNC: 9.1 MG/DL (ref 8.6–10.5)
CHLORIDE SERPL-SCNC: 105 MMOL/L (ref 98–107)
CHOLEST SERPL-MCNC: 169 MG/DL (ref 0–200)
CO2 SERPL-SCNC: 23.4 MMOL/L (ref 22–29)
CREAT SERPL-MCNC: 0.66 MG/DL (ref 0.57–1)
EGFRCR SERPLBLD CKD-EPI 2021: 106.4 ML/MIN/1.73
GLOBULIN UR ELPH-MCNC: 3.4 GM/DL
GLUCOSE SERPL-MCNC: 120 MG/DL (ref 65–99)
HBA1C MFR BLD: 5.9 % (ref 4.8–5.6)
HDLC SERPL-MCNC: 64 MG/DL (ref 40–60)
LDLC SERPL CALC-MCNC: 86 MG/DL (ref 0–100)
LDLC/HDLC SERPL: 1.31 {RATIO}
POTASSIUM SERPL-SCNC: 4.1 MMOL/L (ref 3.5–5.2)
PROT SERPL-MCNC: 7.6 G/DL (ref 6–8.5)
SODIUM SERPL-SCNC: 141 MMOL/L (ref 136–145)
T4 FREE SERPL-MCNC: 0.97 NG/DL (ref 0.93–1.7)
TRIGL SERPL-MCNC: 107 MG/DL (ref 0–150)
TSH SERPL DL<=0.05 MIU/L-ACNC: 2.91 UIU/ML (ref 0.27–4.2)
VLDLC SERPL-MCNC: 19 MG/DL (ref 5–40)

## 2024-03-26 PROCEDURE — 80053 COMPREHEN METABOLIC PANEL: CPT | Performed by: NURSE PRACTITIONER

## 2024-03-26 PROCEDURE — 84439 ASSAY OF FREE THYROXINE: CPT | Performed by: NURSE PRACTITIONER

## 2024-03-26 PROCEDURE — 80061 LIPID PANEL: CPT | Performed by: NURSE PRACTITIONER

## 2024-03-26 PROCEDURE — 83036 HEMOGLOBIN GLYCOSYLATED A1C: CPT | Performed by: NURSE PRACTITIONER

## 2024-03-26 PROCEDURE — 84443 ASSAY THYROID STIM HORMONE: CPT | Performed by: NURSE PRACTITIONER

## 2024-03-28 ENCOUNTER — TELEPHONE (OUTPATIENT)
Dept: PSYCHIATRY | Facility: CLINIC | Age: 54
End: 2024-03-28
Payer: COMMERCIAL

## 2024-03-28 NOTE — TELEPHONE ENCOUNTER
----- Message from PALOMO Bass sent at 3/27/2024  7:06 AM EDT -----  Would you notify patient, recommend f/u with PCP for further review of lab reports, glucose, A1C, and TSH elevated.  Thank you  ----- Message -----  From: Angelique Dao MA  Sent: 3/20/2024   9:35 AM EDT  To: PALOMO Bass
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
## 2024-04-16 DIAGNOSIS — F43.10 POST TRAUMATIC STRESS DISORDER (PTSD): ICD-10-CM

## 2024-04-16 DIAGNOSIS — F31.4 BIPOLAR DISORDER, CURRENT EPISODE DEPRESSED, SEVERE, WITHOUT PSYCHOTIC FEATURES: ICD-10-CM

## 2024-04-16 DIAGNOSIS — F43.25 ADJUSTMENT DISORDER WITH MIXED DISTURBANCE OF EMOTIONS AND CONDUCT: ICD-10-CM

## 2024-04-16 DIAGNOSIS — Z79.899 MEDICATION MANAGEMENT: ICD-10-CM

## 2024-04-17 ENCOUNTER — OFFICE VISIT (OUTPATIENT)
Dept: PSYCHIATRY | Facility: CLINIC | Age: 54
End: 2024-04-17
Payer: COMMERCIAL

## 2024-04-17 VITALS
DIASTOLIC BLOOD PRESSURE: 70 MMHG | HEIGHT: 62 IN | HEART RATE: 82 BPM | SYSTOLIC BLOOD PRESSURE: 128 MMHG | OXYGEN SATURATION: 98 % | BODY MASS INDEX: 36.22 KG/M2 | WEIGHT: 196.8 LBS

## 2024-04-17 DIAGNOSIS — Z79.899 MEDICATION MANAGEMENT: ICD-10-CM

## 2024-04-17 DIAGNOSIS — F31.4 BIPOLAR DISORDER, CURRENT EPISODE DEPRESSED, SEVERE, WITHOUT PSYCHOTIC FEATURES: Primary | ICD-10-CM

## 2024-04-17 DIAGNOSIS — F43.25 ADJUSTMENT DISORDER WITH MIXED DISTURBANCE OF EMOTIONS AND CONDUCT: ICD-10-CM

## 2024-04-17 DIAGNOSIS — F43.10 POST TRAUMATIC STRESS DISORDER (PTSD): ICD-10-CM

## 2024-04-17 RX ORDER — FLUOXETINE 10 MG/1
10 CAPSULE ORAL DAILY
Qty: 30 CAPSULE | Refills: 0 | Status: SHIPPED | OUTPATIENT
Start: 2024-04-17 | End: 2025-04-17

## 2024-04-17 RX ORDER — OLANZAPINE AND SAMIDORPHAN L-MALATE 10; 10 MG/1; MG/1
1 TABLET, FILM COATED ORAL DAILY
Qty: 30 TABLET | Refills: 0 | Status: SHIPPED | OUTPATIENT
Start: 2024-04-17 | End: 2024-04-17 | Stop reason: SDUPTHER

## 2024-04-17 RX ORDER — FLUOXETINE HYDROCHLORIDE 20 MG/1
20 CAPSULE ORAL DAILY
Qty: 30 CAPSULE | Refills: 0 | Status: SHIPPED | OUTPATIENT
Start: 2024-04-17

## 2024-04-17 RX ORDER — OLANZAPINE AND SAMIDORPHAN L-MALATE 10; 10 MG/1; MG/1
1 TABLET, FILM COATED ORAL DAILY
Qty: 30 TABLET | Refills: 0 | Status: SHIPPED | OUTPATIENT
Start: 2024-04-17

## 2024-04-17 RX ORDER — FLUOXETINE HYDROCHLORIDE 20 MG/1
20 CAPSULE ORAL DAILY
Qty: 30 CAPSULE | Refills: 0 | Status: SHIPPED | OUTPATIENT
Start: 2024-04-17 | End: 2024-04-17 | Stop reason: SDUPTHER

## 2024-04-17 RX ORDER — HYDROXYZINE HYDROCHLORIDE 10 MG/1
10 TABLET, FILM COATED ORAL 3 TIMES DAILY PRN
Qty: 90 TABLET | Refills: 0 | Status: SHIPPED | OUTPATIENT
Start: 2024-04-17
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
## 2024-05-15 DIAGNOSIS — F31.4 BIPOLAR DISORDER, CURRENT EPISODE DEPRESSED, SEVERE, WITHOUT PSYCHOTIC FEATURES: ICD-10-CM

## 2024-05-15 RX ORDER — FLUOXETINE 10 MG/1
CAPSULE ORAL
Qty: 30 CAPSULE | Refills: 0 | Status: SHIPPED | OUTPATIENT
Start: 2024-05-15 | End: 2024-05-17

## 2024-05-17 ENCOUNTER — OFFICE VISIT (OUTPATIENT)
Dept: PSYCHIATRY | Facility: CLINIC | Age: 54
End: 2024-05-17
Payer: COMMERCIAL

## 2024-05-17 VITALS
HEART RATE: 62 BPM | BODY MASS INDEX: 37.25 KG/M2 | OXYGEN SATURATION: 100 % | SYSTOLIC BLOOD PRESSURE: 118 MMHG | DIASTOLIC BLOOD PRESSURE: 70 MMHG | WEIGHT: 202.4 LBS | HEIGHT: 62 IN

## 2024-05-17 DIAGNOSIS — F31.4 BIPOLAR DISORDER, CURRENT EPISODE DEPRESSED, SEVERE, WITHOUT PSYCHOTIC FEATURES: Primary | ICD-10-CM

## 2024-05-17 DIAGNOSIS — F43.25 ADJUSTMENT DISORDER WITH MIXED DISTURBANCE OF EMOTIONS AND CONDUCT: ICD-10-CM

## 2024-05-17 DIAGNOSIS — Z79.899 MEDICATION MANAGEMENT: ICD-10-CM

## 2024-05-17 DIAGNOSIS — F43.10 POST TRAUMATIC STRESS DISORDER (PTSD): ICD-10-CM

## 2024-05-17 RX ORDER — FLUOXETINE HYDROCHLORIDE 40 MG/1
40 CAPSULE ORAL DAILY
Qty: 30 CAPSULE | Refills: 0 | Status: SHIPPED | OUTPATIENT
Start: 2024-05-17

## 2024-05-17 RX ORDER — OLANZAPINE AND SAMIDORPHAN L-MALATE 10; 10 MG/1; MG/1
1 TABLET, FILM COATED ORAL DAILY
Qty: 30 TABLET | Refills: 0 | Status: SHIPPED | OUTPATIENT
Start: 2024-05-17

## 2024-05-17 RX ORDER — HYDROXYZINE HYDROCHLORIDE 10 MG/1
10 TABLET, FILM COATED ORAL 3 TIMES DAILY PRN
Qty: 90 TABLET | Refills: 0 | Status: SHIPPED | OUTPATIENT
Start: 2024-05-17
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
## 2024-06-14 DIAGNOSIS — F43.10 POST TRAUMATIC STRESS DISORDER (PTSD): ICD-10-CM

## 2024-06-14 DIAGNOSIS — Z79.899 MEDICATION MANAGEMENT: ICD-10-CM

## 2024-06-14 DIAGNOSIS — F31.4 BIPOLAR DISORDER, CURRENT EPISODE DEPRESSED, SEVERE, WITHOUT PSYCHOTIC FEATURES: ICD-10-CM

## 2024-06-14 DIAGNOSIS — F43.25 ADJUSTMENT DISORDER WITH MIXED DISTURBANCE OF EMOTIONS AND CONDUCT: ICD-10-CM

## 2024-06-14 RX ORDER — FLUOXETINE HYDROCHLORIDE 40 MG/1
40 CAPSULE ORAL DAILY
Qty: 30 CAPSULE | Refills: 0 | Status: SHIPPED | OUTPATIENT
Start: 2024-06-14

## 2024-06-19 ENCOUNTER — OFFICE VISIT (OUTPATIENT)
Dept: PSYCHIATRY | Facility: CLINIC | Age: 54
End: 2024-06-19
Payer: COMMERCIAL

## 2024-06-19 VITALS
WEIGHT: 205.6 LBS | SYSTOLIC BLOOD PRESSURE: 132 MMHG | BODY MASS INDEX: 37.84 KG/M2 | OXYGEN SATURATION: 97 % | HEIGHT: 62 IN | HEART RATE: 66 BPM | DIASTOLIC BLOOD PRESSURE: 80 MMHG

## 2024-06-19 DIAGNOSIS — F43.25 ADJUSTMENT DISORDER WITH MIXED DISTURBANCE OF EMOTIONS AND CONDUCT: ICD-10-CM

## 2024-06-19 DIAGNOSIS — F43.10 POST TRAUMATIC STRESS DISORDER (PTSD): ICD-10-CM

## 2024-06-19 DIAGNOSIS — Z79.899 MEDICATION MANAGEMENT: ICD-10-CM

## 2024-06-19 DIAGNOSIS — F31.4 BIPOLAR DISORDER, CURRENT EPISODE DEPRESSED, SEVERE, WITHOUT PSYCHOTIC FEATURES: Primary | ICD-10-CM

## 2024-06-19 PROCEDURE — 1160F RVW MEDS BY RX/DR IN RCRD: CPT | Performed by: NURSE PRACTITIONER

## 2024-06-19 PROCEDURE — 99214 OFFICE O/P EST MOD 30 MIN: CPT | Performed by: NURSE PRACTITIONER

## 2024-06-19 PROCEDURE — 1159F MED LIST DOCD IN RCRD: CPT | Performed by: NURSE PRACTITIONER

## 2024-06-19 RX ORDER — OLANZAPINE AND SAMIDORPHAN L-MALATE 10; 10 MG/1; MG/1
1 TABLET, FILM COATED ORAL DAILY
Qty: 30 TABLET | Refills: 0 | Status: SHIPPED | OUTPATIENT
Start: 2024-06-19

## 2024-06-19 RX ORDER — FLUOXETINE HYDROCHLORIDE 40 MG/1
40 CAPSULE ORAL DAILY
Qty: 30 CAPSULE | Refills: 0 | Status: SHIPPED | OUTPATIENT
Start: 2024-06-19

## 2024-06-19 RX ORDER — HYDROXYZINE HYDROCHLORIDE 10 MG/1
10 TABLET, FILM COATED ORAL 3 TIMES DAILY PRN
Qty: 90 TABLET | Refills: 0 | Status: SHIPPED | OUTPATIENT
Start: 2024-06-19
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
## 2024-07-18 ENCOUNTER — OFFICE VISIT (OUTPATIENT)
Dept: PSYCHIATRY | Facility: CLINIC | Age: 54
End: 2024-07-18
Payer: COMMERCIAL

## 2024-07-18 VITALS
HEIGHT: 62 IN | SYSTOLIC BLOOD PRESSURE: 132 MMHG | HEART RATE: 64 BPM | DIASTOLIC BLOOD PRESSURE: 94 MMHG | OXYGEN SATURATION: 96 % | BODY MASS INDEX: 38.68 KG/M2 | WEIGHT: 210.2 LBS

## 2024-07-18 DIAGNOSIS — F31.4 BIPOLAR DISORDER, CURRENT EPISODE DEPRESSED, SEVERE, WITHOUT PSYCHOTIC FEATURES: Primary | ICD-10-CM

## 2024-07-18 DIAGNOSIS — Z79.899 MEDICATION MANAGEMENT: ICD-10-CM

## 2024-07-18 DIAGNOSIS — F43.10 POST TRAUMATIC STRESS DISORDER (PTSD): ICD-10-CM

## 2024-07-18 DIAGNOSIS — F43.25 ADJUSTMENT DISORDER WITH MIXED DISTURBANCE OF EMOTIONS AND CONDUCT: ICD-10-CM

## 2024-07-18 RX ORDER — FLUOXETINE HYDROCHLORIDE 40 MG/1
40 CAPSULE ORAL DAILY
Qty: 30 CAPSULE | Refills: 0 | Status: SHIPPED | OUTPATIENT
Start: 2024-07-18

## 2024-07-18 RX ORDER — HYDROXYZINE HYDROCHLORIDE 10 MG/1
10 TABLET, FILM COATED ORAL 3 TIMES DAILY PRN
Qty: 90 TABLET | Refills: 0 | Status: SHIPPED | OUTPATIENT
Start: 2024-07-18

## 2024-08-02 ENCOUNTER — TRANSCRIBE ORDERS (OUTPATIENT)
Dept: ADMINISTRATIVE | Facility: HOSPITAL | Age: 54
End: 2024-08-02
Payer: COMMERCIAL

## 2024-08-02 DIAGNOSIS — Z12.31 VISIT FOR SCREENING MAMMOGRAM: Primary | ICD-10-CM

## 2024-08-02 DIAGNOSIS — Z87.891 PERSONAL HISTORY OF TOBACCO USE, PRESENTING HAZARDS TO HEALTH: ICD-10-CM
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
## 2024-08-15 ENCOUNTER — OFFICE VISIT (OUTPATIENT)
Dept: PSYCHIATRY | Facility: CLINIC | Age: 54
End: 2024-08-15
Payer: COMMERCIAL

## 2024-08-15 VITALS
HEART RATE: 54 BPM | WEIGHT: 211.8 LBS | DIASTOLIC BLOOD PRESSURE: 74 MMHG | HEIGHT: 62 IN | OXYGEN SATURATION: 99 % | BODY MASS INDEX: 38.98 KG/M2 | SYSTOLIC BLOOD PRESSURE: 128 MMHG

## 2024-08-15 DIAGNOSIS — F43.10 POST TRAUMATIC STRESS DISORDER (PTSD): ICD-10-CM

## 2024-08-15 DIAGNOSIS — F43.25 ADJUSTMENT DISORDER WITH MIXED DISTURBANCE OF EMOTIONS AND CONDUCT: ICD-10-CM

## 2024-08-15 DIAGNOSIS — F31.4 BIPOLAR DISORDER, CURRENT EPISODE DEPRESSED, SEVERE, WITHOUT PSYCHOTIC FEATURES: Primary | ICD-10-CM

## 2024-08-15 DIAGNOSIS — Z79.899 MEDICATION MANAGEMENT: ICD-10-CM

## 2024-08-15 RX ORDER — HYDROXYZINE HYDROCHLORIDE 10 MG/1
10 TABLET, FILM COATED ORAL 3 TIMES DAILY PRN
Qty: 90 TABLET | Refills: 0 | Status: SHIPPED | OUTPATIENT
Start: 2024-08-15

## 2024-08-15 RX ORDER — FLUOXETINE HYDROCHLORIDE 40 MG/1
40 CAPSULE ORAL DAILY
Qty: 30 CAPSULE | Refills: 0 | Status: SHIPPED | OUTPATIENT
Start: 2024-08-15

## 2024-08-23 ENCOUNTER — HOSPITAL ENCOUNTER (OUTPATIENT)
Dept: MAMMOGRAPHY | Facility: HOSPITAL | Age: 54
Discharge: HOME | End: 2024-08-23
Payer: COMMERCIAL

## 2024-08-23 ENCOUNTER — HOSPITAL ENCOUNTER (OUTPATIENT)
Dept: CT IMAGING | Facility: HOSPITAL | Age: 54
Discharge: HOME | End: 2024-08-23
Payer: COMMERCIAL

## 2024-08-23 DIAGNOSIS — Z12.31 VISIT FOR SCREENING MAMMOGRAM: ICD-10-CM

## 2024-08-23 DIAGNOSIS — Z87.891 PERSONAL HISTORY OF TOBACCO USE, PRESENTING HAZARDS TO HEALTH: ICD-10-CM

## 2024-08-23 PROCEDURE — 71271 CT THORAX LUNG CANCER SCR C-: CPT

## 2024-08-23 PROCEDURE — 77067 SCR MAMMO BI INCL CAD: CPT

## 2024-08-23 PROCEDURE — 77063 BREAST TOMOSYNTHESIS BI: CPT
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
## 2024-10-21 ENCOUNTER — OFFICE VISIT (OUTPATIENT)
Dept: PSYCHIATRY | Facility: CLINIC | Age: 54
End: 2024-10-21
Payer: COMMERCIAL

## 2024-10-21 VITALS
HEIGHT: 62 IN | DIASTOLIC BLOOD PRESSURE: 80 MMHG | BODY MASS INDEX: 41.37 KG/M2 | WEIGHT: 224.8 LBS | HEART RATE: 76 BPM | OXYGEN SATURATION: 97 % | SYSTOLIC BLOOD PRESSURE: 120 MMHG

## 2024-10-21 DIAGNOSIS — F43.10 POST TRAUMATIC STRESS DISORDER (PTSD): ICD-10-CM

## 2024-10-21 DIAGNOSIS — F31.4 BIPOLAR DISORDER, CURRENT EPISODE DEPRESSED, SEVERE, WITHOUT PSYCHOTIC FEATURES: Primary | ICD-10-CM

## 2024-10-21 DIAGNOSIS — F43.25 ADJUSTMENT DISORDER WITH MIXED DISTURBANCE OF EMOTIONS AND CONDUCT: ICD-10-CM

## 2024-10-21 DIAGNOSIS — Z79.899 MEDICATION MANAGEMENT: ICD-10-CM

## 2024-10-21 PROCEDURE — 1160F RVW MEDS BY RX/DR IN RCRD: CPT | Performed by: NURSE PRACTITIONER

## 2024-10-21 PROCEDURE — 99214 OFFICE O/P EST MOD 30 MIN: CPT | Performed by: NURSE PRACTITIONER

## 2024-10-21 PROCEDURE — 1159F MED LIST DOCD IN RCRD: CPT | Performed by: NURSE PRACTITIONER

## 2024-10-21 RX ORDER — FLUOXETINE 40 MG/1
40 CAPSULE ORAL DAILY
Qty: 30 CAPSULE | Refills: 1 | Status: SHIPPED | OUTPATIENT
Start: 2024-10-21

## 2024-10-21 RX ORDER — HYDROXYZINE HYDROCHLORIDE 25 MG/1
25 TABLET, FILM COATED ORAL 3 TIMES DAILY PRN
Qty: 90 TABLET | Refills: 1 | Status: SHIPPED | OUTPATIENT
Start: 2024-10-21
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
## 2024-12-16 ENCOUNTER — OFFICE VISIT (OUTPATIENT)
Dept: PSYCHIATRY | Facility: CLINIC | Age: 54
End: 2024-12-16
Payer: COMMERCIAL

## 2024-12-16 VITALS
SYSTOLIC BLOOD PRESSURE: 106 MMHG | HEIGHT: 62 IN | OXYGEN SATURATION: 97 % | WEIGHT: 209.8 LBS | BODY MASS INDEX: 38.61 KG/M2 | DIASTOLIC BLOOD PRESSURE: 78 MMHG | HEART RATE: 67 BPM

## 2024-12-16 DIAGNOSIS — F43.25 ADJUSTMENT DISORDER WITH MIXED DISTURBANCE OF EMOTIONS AND CONDUCT: ICD-10-CM

## 2024-12-16 DIAGNOSIS — Z79.899 MEDICATION MANAGEMENT: ICD-10-CM

## 2024-12-16 DIAGNOSIS — F43.10 POST TRAUMATIC STRESS DISORDER (PTSD): ICD-10-CM

## 2024-12-16 DIAGNOSIS — F31.4 BIPOLAR DISORDER, CURRENT EPISODE DEPRESSED, SEVERE, WITHOUT PSYCHOTIC FEATURES: Primary | ICD-10-CM

## 2024-12-16 RX ORDER — HYDROXYZINE HYDROCHLORIDE 25 MG/1
25 TABLET, FILM COATED ORAL 3 TIMES DAILY PRN
Qty: 90 TABLET | Refills: 1 | Status: SHIPPED | OUTPATIENT
Start: 2024-12-16

## 2024-12-16 RX ORDER — ONDANSETRON 4 MG/1
TABLET, ORALLY DISINTEGRATING ORAL
COMMUNITY
Start: 2024-10-22

## 2024-12-16 RX ORDER — FLUOXETINE 40 MG/1
40 CAPSULE ORAL DAILY
Qty: 30 CAPSULE | Refills: 1 | Status: SHIPPED | OUTPATIENT
Start: 2024-12-16

## 2024-12-16 RX ORDER — LINACLOTIDE 145 UG/1
CAPSULE, GELATIN COATED ORAL
COMMUNITY
Start: 2024-10-23

## 2024-12-16 RX ORDER — DOCUSATE SODIUM 100 MG/1
CAPSULE, LIQUID FILLED ORAL
COMMUNITY
Start: 2024-12-03
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
## 2025-02-12 ENCOUNTER — PRIOR AUTHORIZATION (OUTPATIENT)
Dept: PSYCHIATRY | Facility: CLINIC | Age: 55
End: 2025-02-12

## 2025-02-12 ENCOUNTER — OFFICE VISIT (OUTPATIENT)
Dept: PSYCHIATRY | Facility: CLINIC | Age: 55
End: 2025-02-12
Payer: COMMERCIAL

## 2025-02-12 VITALS
HEIGHT: 62 IN | HEART RATE: 77 BPM | WEIGHT: 210.9 LBS | SYSTOLIC BLOOD PRESSURE: 110 MMHG | BODY MASS INDEX: 38.81 KG/M2 | DIASTOLIC BLOOD PRESSURE: 70 MMHG | OXYGEN SATURATION: 98 %

## 2025-02-12 DIAGNOSIS — F31.4 BIPOLAR DISORDER, CURRENT EPISODE DEPRESSED, SEVERE, WITHOUT PSYCHOTIC FEATURES: Primary | ICD-10-CM

## 2025-02-12 DIAGNOSIS — Z79.899 MEDICATION MANAGEMENT: ICD-10-CM

## 2025-02-12 DIAGNOSIS — F43.10 POST TRAUMATIC STRESS DISORDER (PTSD): ICD-10-CM

## 2025-02-12 DIAGNOSIS — F43.25 ADJUSTMENT DISORDER WITH MIXED DISTURBANCE OF EMOTIONS AND CONDUCT: ICD-10-CM

## 2025-02-12 RX ORDER — HYDROXYZINE HYDROCHLORIDE 25 MG/1
25 TABLET, FILM COATED ORAL 3 TIMES DAILY PRN
Qty: 90 TABLET | Refills: 1 | Status: SHIPPED | OUTPATIENT
Start: 2025-02-12

## 2025-02-12 RX ORDER — FLUOXETINE 40 MG/1
40 CAPSULE ORAL DAILY
Qty: 30 CAPSULE | Refills: 1 | Status: SHIPPED | OUTPATIENT
Start: 2025-02-12

## 2025-02-12 NOTE — TELEPHONE ENCOUNTER
Shaun Morales (Key: [id number])  Rx #: [id number]  Lybalvi (olanzapine and samidorphan) 20-10MG tablets

## 2025-02-12 NOTE — TELEPHONE ENCOUNTER
The request has been approved. The authorization is effective from 02/12/2025 to 02/12/2026, as long as the member is enrolled in their current health plan. A written notification letter will follow with additional details.. Authorization Expiration Date: February 12, 2026.

## 2025-04-04 NOTE — PROGRESS NOTES
"Subjective   Shaun Morales is a 54 y.o. female who presents today for follow up    Chief Complaint: Polar disorder, adjustment disorder, posttraumatic stress disorder    History of Present Illness:   History of Present Illness   History of Present Illness      {Patient is accompanied by:27070}    {Recent psychatrict hospitalization (Optional):77742::\"from *** to ***.\"}    The following portions of the patient's history were reviewed and updated as appropriate: allergies, current medications, past family history, past medical history, past social history, past surgical history and problem list.      Past Medical History:  Past Medical History:   Diagnosis Date    Anxiety     Asthma     mild    Back pain     Depression     Elevated cholesterol     Environmental allergies     pollen, pollution    Family history of pseudocholinesterase deficiency     sister, dad had it.  Unsure if she has it, but reports has never had succinylcholine    Fatigue     GERD (gastroesophageal reflux disease)     rarely, associated with onions and red sauces, avoids both.  PRN Tums, EGD with Dr. Rich 2017, op report reviewed, no HH. urease neg. serum h. pyl neg    H. pylori infection     symptoms of abodminal pain/dyspepsia, tx    History of MRSA infection 2012    UTI WITH REPORRTED 3-4 CLEAN CATCH WWITH NO mrSA     Hyperlipidemia     Hypertension     IBS (irritable bowel syndrome)     constipation    Interstitial cystitis     recent procedure to \"stretch\" the bladder, feels better; has pain in bladder as primary symptom, dyspareunia    Mitral valve regurgitation     better now, has no symptoms, + hear murmur    Mood disorder     no formal dx of bipolar disorder, but after bad divorce took lithium    Nausea     car sickness, has PRN Zofran, motion sickness, previously took meclizine    Peripheral edema     PONV (postoperative nausea and vomiting)     Prediabetes     Psoriasis     Psoriatic arthritis     Trauma of chest     2014 moving " trailer, fell on her, several cracked ribs on left, feels it caused her umbo hernia recurrence.  had to be dug out.  Exhusband fx pelvis, mult surgeries.    Vitamin D deficiency        Social History:  Social History     Socioeconomic History    Marital status:      Spouse name: Lane    Number of children: 1    Highest education level: Some college, no degree   Tobacco Use    Smoking status: Every Day     Current packs/day: 0.00     Average packs/day: 0.5 packs/day for 1 year (0.5 ttl pk-yrs)     Types: Cigarettes     Start date: 2012     Last attempt to quit: 2013     Years since quittin.2    Smokeless tobacco: Never   Vaping Use    Vaping status: Never Used   Substance and Sexual Activity    Alcohol use: No     Comment: on occassion socially    Drug use: No    Sexual activity: Defer     Birth control/protection: Surgical       Family History:  Family History   Problem Relation Age of Onset    Suicide Attempts Mother     Depression Mother     Anxiety disorder Mother     Diabetes Mother     Hypertension Mother     Stroke Mother     Heart disease Mother     Alcohol abuse Father     Cancer Father     Sleep apnea Father     Diabetes Father     Anesthesia problems Sister     Heart attack Maternal Grandfather     Heart disease Maternal Grandfather     Heart attack Maternal Grandmother 70    Obesity Maternal Grandmother     Hypertension Maternal Grandmother     Heart disease Maternal Grandmother     Sleep apnea Maternal Grandmother     Stroke Maternal Grandmother     Heart attack Paternal Grandmother 54    Sleep apnea Paternal Grandmother     Heart disease Paternal Grandmother     Hypertension Paternal Grandmother     Obesity Paternal Grandmother     Rheum arthritis Neg Hx     Osteoarthritis Neg Hx     Asthma Neg Hx     Heart failure Neg Hx     Hyperlipidemia Neg Hx     Migraines Neg Hx     Rashes / Skin problems Neg Hx     Seizures Neg Hx     Thyroid disease Neg Hx     Breast cancer Neg Hx         Past Surgical History:  Past Surgical History:   Procedure Laterality Date    APPENDECTOMY      CARDIAC CATHETERIZATION  2007    done after discovery of MVP, also had a CHERISE, normal per patient     SECTION      COLONOSCOPY      COLONOSCOPY N/A 2023    Procedure: COLONOSCOPY;  Surgeon: Sona Guadalupe MD;  Location:  [facility];  Service: Gastroenterology;  Laterality: N/A;    CYSTOSCOPY BLADDER HYDRODISTENSION N/A 2017    Procedure: CYSTOSCOPY BLADDER HYDRODISTENSION;  Surgeon: Ion Herbert MD;  Location:  [facility];  Service:     DIAGNOSTIC LAPAROSCOPY N/A 10/14/2016    Procedure: DIAGNOSTIC LAPAROSCOPY POSSIBLE RIGHT SALPINGOOPHORECTOMY;  Surgeon: Rory Owens DO;  Location:  [facility];  Service:     ENDOSCOPY      Dr. Rich    ENDOSCOPY N/A 2018    Procedure: ESOPHAGOGASTRODUODENOSCOPY;  Surgeon: Aneesh Mckeon MD;  Location:  [facility];  Service:     FOOT SURGERY  1984    GASTRECTOMY      GASTRIC SLEEVE LAPAROSCOPIC N/A 2018    Procedure: GASTRIC SLEEVE LAPAROSCOPIC;  Surgeon: Aneesh Mckeon MD;  Location:  [facility];  Service:     HYSTERECTOMY  ,     laparoscopy supra-cervical hysterectomy ,  cervix and 1 ovary removed.  Remaining ovary has a cyst. initially done for pelvic pain/fibroids    LAPAROSCOPIC APPENDECTOMY      PANNICULECTOMY N/A 2020    Procedure: PANNICULECTOMY;  Surgeon: Kelsi Estrada MD;  Location:  [facility];  Service: General;  Laterality: N/A;    PARAESOPHAGEAL HERNIA REPAIR N/A 2018    Procedure: PARAESOPHAGEAL HERNIA REPAIR LAPAROSCOPIC;  Surgeon: Aneesh Mckeon MD;  Location:  [facility];  Service:     REPLACEMENT TOTAL KNEE      SKIN BIOPSY      TRACHELECTOMY N/A 10/14/2016    Procedure: TRACHELECTOMY VAGINAL;  Surgeon: Rory Owens DO;  [facility];  Service:     TUBAL ABDOMINAL LIGATION      LEFT OVARY REMAINS    UMBILICAL HERNIA REPAIR N/A  "10/14/2016    Procedure: UMBILICAL HERNIA REPAIR;  Surgeon: Spike Porter MD;  [facility];  Service:     UMBILICAL HERNIA REPAIR N/A 12/09/2016    Procedure: UMBILICAL HERNIA REPAIR;  Surgeon: Spike Porter MD;  Location:  [facility];  with mesh, supraumbilical    WISDOM TOOTH EXTRACTION  2000       Problem List:  Patient Active Problem List   Diagnosis    S/P laparoscopic supracervical hysterectomy    Status post umbilical hernia repair, follow-up exam    Umbilical hernia without obstruction and without gangrene    Morbid obesity with BMI of 50.0-59.9, adult    Chest pain    Interstitial cystitis    Abdominal pain    Morbid obesity with body mass index (BMI) of 50.0 to 59.9 in adult    Abdominal pannus    Encounter for screening for malignant neoplasm of colon    Bipolar disorder, unspecified    Adjustment disorder with mixed disturbance of emotions and conduct    Post traumatic stress disorder (PTSD)       Allergy:   Allergies   Allergen Reactions    Amlodipine GI Intolerance and Arrhythmia    Nifedipine GI Intolerance and Arrhythmia     Adalat, procardia    Morphine And Codeine Itching     Dilaudid ok, percocet/lortab ok    Adhesive Tape Rash     Can tolerate steristrips and skin glue    Chlorhexidine Rash    Diazepam Anxiety     \"reverse effect,\" \"makes me absolutely crazy\"    Midazolam Anxiety    Nsaids Unknown (See Comments)     Pt states she cannot take NSAIDs for a peroid of time after having her Gastric Sleeve Surgery     Sulfa Antibiotics Rash        Current Medications:   Current Outpatient Medications   Medication Sig Dispense Refill    aspirin 81 MG EC tablet Take 1 tablet by mouth Daily. Indications: Disease involving Lipid Deposits in the Arteries      atorvastatin (LIPITOR) 10 MG tablet Take 1 tablet by mouth Daily. Indications: High Amount of Fats in the Blood      carvedilol (COREG) 3.125 MG tablet Take 1 tablet by mouth 2 (Two) Times a Day With Meals. Indications: High Blood Pressure   "    docusate sodium (COLACE) 100 MG capsule       estradiol (CLIMARA) 0.025 MG/24HR patch Place 1 patch on the skin as directed by provider 1 (One) Time Per Week. 3 weeks on, 1 week off  Indications: Deficiency of the Hormone Estrogen      FLUoxetine (PROzac) 40 MG capsule Take 1 capsule by mouth Daily. 30 capsule 1    hydrOXYzine (ATARAX) 25 MG tablet Take 1 tablet by mouth 3 (Three) Times a Day As Needed for Anxiety. Indications: Feeling Anxious 90 tablet 1    Linzess 145 MCG capsule capsule       lisinopril (PRINIVIL,ZESTRIL) 30 MG tablet Take 1 tablet by mouth Daily. Indications: High Blood Pressure      montelukast (SINGULAIR) 10 MG tablet Take 1 tablet by mouth Every Night. Indications: Hayfever      nitroglycerin (NITROSTAT) 0.4 MG SL tablet Put 1 pill under tongue every 5min as needed for chest pain.No more than 3 doses in 15min.Call 911 if pain unrelieved 5min after 1st dose.      OLANZapine-Samidorphan 20-10 MG tablet Take 1 tablet by mouth Every Night. 30 tablet 1    omeprazole (priLOSEC) 40 MG capsule Take 1 capsule by mouth 2 (two) times a day. Indications: Heartburn      ondansetron ODT (ZOFRAN-ODT) 4 MG disintegrating tablet       ranolazine (RANEXA) 500 MG 12 hr tablet Take 1 tablet by mouth 2 (Two) Times a Day. Indications: Stable Angina Pectoris      vitamin D (ERGOCALCIFEROL) 76724 units capsule capsule Take 1 capsule by mouth 1 (One) Time Per Week. Prior to [facility] Admission, Patient was on: takes on wednesdays  Indications: Vitamin D Deficiency       No current facility-administered medications for this visit.       Review of Symptoms:    Review of Systems   Psychiatric/Behavioral:  Positive for depressed mood. The patient is nervous/anxious.    All other systems reviewed and are negative.      Objective   Physical Exam:   not currently breastfeeding.  There is no height or weight on file to calculate BMI.  No height and weight on file for this encounter.    04/04/25  MENTAL STATUS EXAM   General  Appearance:  Cleanly groomed and dressed  Eye Contact:  Good eye contact  Attitude:  Cooperative  Motor Activity:  Normal gait, posture  Speech:  Normal rate, tone, volume  Language:  Spontaneous  Mood and affect:  Normal, pleasant  Hopelessness:  Denies  Thought Process:  Logical  Associations/ Thought Content:  No delusions  Hallucinations:  None  Suicidal Ideations:  Not present  Homicidal Ideation:  Not present  Sensorium:  Alert  Orientation:  Person, place, time and situation  Insight:  Fair  Judgement:  Fair  Reliability:  Fair  Impulse Control:  Fair        PHQ-Score Total:  PHQ-9 Total Score:      Lab Results:   No visits with results within 1 Month(s) from this visit.   Latest known visit with results is:   Office Visit on 03/20/2024   Component Date Value Ref Range Status    External Amphetamine Screen Urine 03/20/2024 Negative   Final    External Benzodiazepine Screen Uri* 03/20/2024 Negative   Final    External Cocaine Screen Urine 03/20/2024 Negative   Final    External THC Screen Urine 03/20/2024 Negative   Final    External Methadone Screen Urine 03/20/2024 Negative   Final    External Methamphetamine Screen Ur* 03/20/2024 Negative   Final    External Oxycodone Screen Urine 03/20/2024 Positive (A)   Final    External Buprenorphine Screen Urine 03/20/2024 Negative   Final    External MDMA 03/20/2024 Negative   Final    External Opiates Screen Urine 03/20/2024 Negative   Final    Total Cholesterol 03/26/2024 169  0 - 200 mg/dL Final    Triglycerides 03/26/2024 107  0 - 150 mg/dL Final    HDL Cholesterol 03/26/2024 64 (H)  40 - 60 mg/dL Final    LDL Cholesterol  03/26/2024 86  0 - 100 mg/dL Final    VLDL Cholesterol 03/26/2024 19  5 - 40 mg/dL Final    LDL/HDL Ratio 03/26/2024 1.31   Final    TSH 03/26/2024 2.910  0.270 - 4.200 uIU/mL Final    Free T4 03/26/2024 0.97  0.93 - 1.70 ng/dL Final    Glucose 03/26/2024 120 (H)  65 - 99 mg/dL Final    BUN 03/26/2024 13  6 - 20 mg/dL Final    Creatinine  2024 0.66  0.57 - 1.00 mg/dL Final    Sodium 2024 141  136 - 145 mmol/L Final    Potassium 2024 4.1  3.5 - 5.2 mmol/L Final    Chloride 2024 105  98 - 107 mmol/L Final    CO2 2024 23.4  22.0 - 29.0 mmol/L Final    Calcium 2024 9.1  8.6 - 10.5 mg/dL Final    Total Protein 2024 7.6  6.0 - 8.5 g/dL Final    Albumin 2024 4.2  3.5 - 5.2 g/dL Final    ALT (SGPT) 2024 13  1 - 33 U/L Final    AST (SGOT) 2024 13  1 - 32 U/L Final    Alkaline Phosphatase 2024 113  39 - 117 U/L Final    Total Bilirubin 2024 0.3  0.0 - 1.2 mg/dL Final    Globulin 2024 3.4  gm/dL Final    A/G Ratio 2024 1.2  g/dL Final    BUN/Creatinine Ratio 2024 19.7  7.0 - 25.0 Final    Anion Gap 2024 12.6  5.0 - 15.0 mmol/L Final    eGFR 2024 106.4  >60.0 mL/min/1.73 Final    Hemoglobin A1C 2024 5.90 (H)  4.80 - 5.60 % Final     Results      Assessment & Plan   Problems Addressed this Visit          Mental Health    Bipolar disorder, unspecified - Primary    Adjustment disorder with mixed disturbance of emotions and conduct    Post traumatic stress disorder (PTSD)     Other Visit Diagnoses         Medication management              Diagnoses         Codes Comments      Bipolar disorder, current episode depressed, severe, without psychotic features    -  Primary ICD-10-CM: F31.4  ICD-9-CM: 296.53       Adjustment disorder with mixed disturbance of emotions and conduct     ICD-10-CM: F43.25  ICD-9-CM: 309.4       Post traumatic stress disorder (PTSD)     ICD-10-CM: F43.10  ICD-9-CM: 309.81       Medication management     ICD-10-CM: Z79.899  ICD-9-CM: V58.69           Assessment & Plan      Social History     Tobacco Use   Smoking Status Every Day    Current packs/day: 0.00    Average packs/day: 0.5 packs/day for 1 year (0.5 ttl pk-yrs)    Types: Cigarettes    Start date: 2012    Last attempt to quit: 2013    Years since quittin.2   Smokeless  Tobacco Never       MATILDA reviewed and appropriate. Patient counseled on use of controlled substances.     -The benefits of a healthy diet and exercise were discussed with patient, especially the positive effects they have on mental health. Patient encouraged to consider lifestyle modification regarding  diet and exercise patterns to maximize results of mental health treatment.  -Reviewed previous available documentation  -Reviewed most recent available labs     -Lengthy discussion with patient on the possible side effects of antipsychotic medications including increased cholesterol, increased blood sugar, and possibility of weight gain.  Also discussed the need to monitor lab work associated with this.  The risk of muscle movement disorders with this class of medication was also discussed.    -I've explained to her that drugs of the SSRI class can have side effects such as weight gain, sexual dysfunction, insomnia, headache, nausea. These medications are generally effective at alleviating symptoms of anxiety and/or depression. Let me know if significant side effects do occur.        Visit Diagnoses:    ICD-10-CM ICD-9-CM   1. Bipolar disorder, current episode depressed, severe, without psychotic features  F31.4 296.53   2. Adjustment disorder with mixed disturbance of emotions and conduct  F43.25 309.4   3. Post traumatic stress disorder (PTSD)  F43.10 309.81   4. Medication management  Z79.899 V58.69         TREATMENT PLAN/GOALS: Continue supportive psychotherapy efforts and medications as indicated. Treatment and medication options discussed during today's visit. Patient acknowledged and verbally consented to continue with current treatment plan and was educated on the importance of compliance with treatment and follow-up appointments.    MEDICATION ISSUES:  Discussed medication options and treatment plan of prescribed medication as well as the risks, benefits, and side effects including potential falls,  possible impaired driving and metabolic adversities among others. Patient is agreeable to call the office with any worsening of symptoms or onset of side effects. Patient is agreeable to call 911 or go to the nearest ER should he/she begin having SI/HI.     MEDS ORDERED DURING VISIT:  No orders of the defined types were placed in this encounter.    -Continue Lybalvi 20-10 mg tablet take 1 tablet by mouth daily (NOT SENT TODAY)  -Continue fluoxetine 40 mg capsule take 1 capsule by mouth daily  -Continue hydroxyzine 25 mg tablet take 1 tablet by mouth 3 times a day as needed for anxiety     No follow-ups on file.         Prognosis: Guarded dependent on medication/follow up and treatment plan compliance.  Functionality: pt showing improvements in important areas of daily functioning.     Short-term goals: Patient will adhere to medication regimen and note continued improvement in symptoms over the next 3 months.   Long-term goals: Patient will be adherent to medication management and psychotherapy with continued improvement in symptoms over the next 6 months.    I spent *** minutes caring for Shaun on this date of service. This time includes time spent by me in the following activities: {TIMEACTIVITIES:97702}    I have personally reviewed this patients note, confirmed and/or updated, this visit as appropriate. History of present illness- interval history, physical examination, assessment and plan, allergies, current medications, past family history, past medical history, past social history, past surgical history and problem list.          This document has been electronically signed by PALOMO Bass   April 4, 2025 06:46 EDT    {MARIA E [employer] Provider Statement:95137}    Part of this note may be an electronic transcription/translation of spoken language to printed text using the Dragon Dictation System.

## 2025-04-09 ENCOUNTER — OFFICE VISIT (OUTPATIENT)
Dept: PSYCHIATRY | Facility: CLINIC | Age: 55
End: 2025-04-09
Payer: COMMERCIAL

## 2025-04-09 VITALS
OXYGEN SATURATION: 99 % | WEIGHT: 210.2 LBS | SYSTOLIC BLOOD PRESSURE: 124 MMHG | BODY MASS INDEX: 38.68 KG/M2 | HEART RATE: 80 BPM | DIASTOLIC BLOOD PRESSURE: 80 MMHG | HEIGHT: 62 IN

## 2025-04-09 DIAGNOSIS — F43.25 ADJUSTMENT DISORDER WITH MIXED DISTURBANCE OF EMOTIONS AND CONDUCT: ICD-10-CM

## 2025-04-09 DIAGNOSIS — F31.4 BIPOLAR DISORDER, CURRENT EPISODE DEPRESSED, SEVERE, WITHOUT PSYCHOTIC FEATURES: Primary | ICD-10-CM

## 2025-04-09 DIAGNOSIS — F43.10 POST TRAUMATIC STRESS DISORDER (PTSD): ICD-10-CM

## 2025-04-09 DIAGNOSIS — Z79.899 MEDICATION MANAGEMENT: ICD-10-CM

## 2025-04-09 RX ORDER — FLUOXETINE 10 MG/1
10 CAPSULE ORAL DAILY
Qty: 30 CAPSULE | Refills: 1 | Status: SHIPPED | OUTPATIENT
Start: 2025-04-09 | End: 2026-04-09

## 2025-04-09 RX ORDER — HYDROXYZINE HYDROCHLORIDE 25 MG/1
25 TABLET, FILM COATED ORAL 3 TIMES DAILY PRN
Qty: 90 TABLET | Refills: 1 | Status: SHIPPED | OUTPATIENT
Start: 2025-04-09

## 2025-04-09 RX ORDER — FLUOXETINE HYDROCHLORIDE 40 MG/1
40 CAPSULE ORAL DAILY
Qty: 30 CAPSULE | Refills: 1 | Status: SHIPPED | OUTPATIENT
Start: 2025-04-09
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
## 2025-06-05 RX ORDER — FLUOXETINE 10 MG/1
CAPSULE ORAL
Qty: 30 CAPSULE | Refills: 1 | Status: SHIPPED | OUTPATIENT
Start: 2025-06-05
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
## 2025-07-02 ENCOUNTER — OFFICE VISIT (OUTPATIENT)
Dept: PSYCHIATRY | Facility: CLINIC | Age: 55
End: 2025-07-02
Payer: COMMERCIAL

## 2025-07-02 VITALS
DIASTOLIC BLOOD PRESSURE: 82 MMHG | BODY MASS INDEX: 36.51 KG/M2 | WEIGHT: 198.4 LBS | HEART RATE: 76 BPM | SYSTOLIC BLOOD PRESSURE: 126 MMHG | HEIGHT: 62 IN | OXYGEN SATURATION: 98 %

## 2025-07-02 DIAGNOSIS — F43.25 ADJUSTMENT DISORDER WITH MIXED DISTURBANCE OF EMOTIONS AND CONDUCT: ICD-10-CM

## 2025-07-02 DIAGNOSIS — F31.4 BIPOLAR DISORDER, CURRENT EPISODE DEPRESSED, SEVERE, WITHOUT PSYCHOTIC FEATURES: Primary | ICD-10-CM

## 2025-07-02 DIAGNOSIS — F43.10 POST TRAUMATIC STRESS DISORDER (PTSD): ICD-10-CM

## 2025-07-02 DIAGNOSIS — Z79.899 MEDICATION MANAGEMENT: ICD-10-CM

## 2025-07-02 RX ORDER — FLUOXETINE 10 MG/1
10 CAPSULE ORAL DAILY
Qty: 30 CAPSULE | Refills: 2 | Status: SHIPPED | OUTPATIENT
Start: 2025-07-02

## 2025-07-02 RX ORDER — HYDROXYZINE HYDROCHLORIDE 25 MG/1
25 TABLET, FILM COATED ORAL 3 TIMES DAILY PRN
Qty: 90 TABLET | Refills: 2 | Status: SHIPPED | OUTPATIENT
Start: 2025-07-02

## 2025-07-02 RX ORDER — FLUOXETINE HYDROCHLORIDE 40 MG/1
40 CAPSULE ORAL DAILY
Qty: 30 CAPSULE | Refills: 2 | Status: SHIPPED | OUTPATIENT
Start: 2025-07-02

## (undated) DEVICE — GLV SURG SENSICARE W/ALOE PF LF SZ6 STRL

## (undated) DEVICE — PK BARIATRIC 10

## (undated) DEVICE — SYS CLS PORTSITE CT CLOSESURE 5AND10/12

## (undated) DEVICE — Device

## (undated) DEVICE — MEDI-VAC YANKAUER SUCTION HANDLE W/BULBOUS TIP: Brand: CARDINAL HEALTH

## (undated) DEVICE — TOWEL,OR,DSP,ST,BLUE,STD,4/PK,20PK/CS: Brand: MEDLINE

## (undated) DEVICE — Device: Brand: DEFENDO AIR/WATER/SUCTION AND BIOPSY VALVE

## (undated) DEVICE — SUT SILK 0 SH 30IN K834H

## (undated) DEVICE — TROCAR: Brand: KII FIOS FIRST ENTRY

## (undated) DEVICE — TUBING, SUCTION, 1/4" X 20', STRAIGHT: Brand: MEDLINE INDUSTRIES, INC.

## (undated) DEVICE — GLV SURG SENSICARE W/ALOE PF LF 7 STRL

## (undated) DEVICE — SINGLE PORT MANIFOLD: Brand: NEPTUNE 2

## (undated) DEVICE — MEDI-VAC NON-CONDUCTIVE SUCTION TUBING: Brand: CARDINAL HEALTH

## (undated) DEVICE — SOL LR 1000ML

## (undated) DEVICE — GLV SURG TRIUMPH ORTHO W/ALOE PF LTX 9 STRL

## (undated) DEVICE — TRY SKINPREP PVP SCRB W PAINT

## (undated) DEVICE — CANNULA,OXY,ADULT,SUPERSOFT,W/7'TUB,UC: Brand: MEDLINE

## (undated) DEVICE — SUT MNCRYL 4/0 PS2 18 IN

## (undated) DEVICE — ENCORE® LATEX MICRO SIZE 7.5, STERILE LATEX POWDER-FREE SURGICAL GLOVE: Brand: ENCORE

## (undated) DEVICE — 3M™ STERI-STRIP™ REINFORCED ADHESIVE SKIN CLOSURES, R1547, 1/2 IN X 4 IN (12 MM X 100 MM), 6 STRIPS/ENVELOPE: Brand: 3M™ STERI-STRIP™

## (undated) DEVICE — ANTIBACTERIAL UNDYED BRAIDED (POLYGLACTIN 910), SYNTHETIC ABSORBABLE SUTURE: Brand: COATED VICRYL

## (undated) DEVICE — PACK,UNIVERSAL,NO GOWNS: Brand: MEDLINE

## (undated) DEVICE — SUT MONOCRYL PLS ANTIB UND 3/0  PS1 27IN

## (undated) DEVICE — ENDOPATH XCEL BLADELESS TROCARS WITH STABILITY SLEEVES: Brand: ENDOPATH XCEL

## (undated) DEVICE — TOTAL TRAY, 16FR 10ML SIL FOLEY, URN: Brand: MEDLINE

## (undated) DEVICE — PAD GRND REM POLYHESIVE A/ DISP

## (undated) DEVICE — APL DUPLOSPRAYER MIS 40CM

## (undated) DEVICE — ENDOGATOR AUXILIARY WATER JET CONNECTOR: Brand: ENDOGATOR

## (undated) DEVICE — BNDR ABD PREM 3PNL 9IN 46TO62IN

## (undated) DEVICE — INTENDED FOR TISSUE SEPARATION, AND OTHER PROCEDURES THAT REQUIRE A SHARP SURGICAL BLADE TO PUNCTURE OR CUT.: Brand: BARD-PARKER ® STAINLESS STEEL BLADES

## (undated) DEVICE — GLV SURG TRIUMPH ORTHO W/ALOE PF LTX 8.5 STRL

## (undated) DEVICE — ENDOGATOR TUBING FOR ENDOGATOR EGP-100 IRRIGATION PUMP,OLYMPUS OFP PUMP, OLYMPUS AFU-100 PUMP AND ERBE EIP2 PUMP: Brand: ENDOGATOR

## (undated) DEVICE — AIRWY 90MM NO9

## (undated) DEVICE — 50" SINGLE PATIENT USE HOVERMATT BREATHABLE: Brand: SINGLE PATIENT USE HOVERMATT

## (undated) DEVICE — SUCTION CANISTER, 1500CC, RIGID: Brand: DEROYAL

## (undated) DEVICE — SPNG LAP PREWSH SFTPK 18X18IN STRL PK/5

## (undated) DEVICE — DRSNG WND BORDR/ADHS NONADHR/GZ LF 4X14IN STRL

## (undated) DEVICE — DRN PENRS 1/2X18IN LTX

## (undated) DEVICE — ENDOPATH XCEL UNIVERSAL TROCAR STABLILITY SLEEVES: Brand: ENDOPATH XCEL

## (undated) DEVICE — ENCORE® LATEX MICRO SIZE 8, STERILE LATEX POWDER-FREE SURGICAL GLOVE: Brand: ENCORE

## (undated) DEVICE — GLV SURG SENSICARE MICRO PF LF 6.5 STRL

## (undated) DEVICE — CONN Y IRR DISP 1P/U

## (undated) DEVICE — HOLDER: Brand: DEROYAL

## (undated) DEVICE — SUT SILK 2/0 FS BLK 18IN 685G

## (undated) DEVICE — [HIGH FLOW INSUFFLATOR,  DO NOT USE IF PACKAGE IS DAMAGED,  KEEP DRY,  KEEP AWAY FROM SUNLIGHT,  PROTECT FROM HEAT AND RADIOACTIVE SOURCES.]: Brand: PNEUMOSURE

## (undated) DEVICE — FLTR PLUMEPORT LAP W/CONN STRL

## (undated) DEVICE — ADHS LIQ MASTISOL 2/3ML

## (undated) DEVICE — GLV SURG PREMIERPRO MIC LTX PF SZ7 BRN

## (undated) DEVICE — COR CYSTO: Brand: MEDLINE INDUSTRIES, INC.

## (undated) DEVICE — JACKSON-PRATT 100CC BULB RESERVOIR: Brand: CARDINAL HEALTH

## (undated) DEVICE — SUT VIC 1 CTX 36IN J977H

## (undated) DEVICE — DRN WND HUBLSS FLUT FULL PERF SIL10MM

## (undated) DEVICE — MARKR SKIN W/RULR AND LBL

## (undated) DEVICE — PK BASIC 70

## (undated) DEVICE — AMD ANTIMICROBIAL NON-ADHERENT ISLAND DRESSING,0.2% POLYHEXAMETHYLENE BIGUANIDE HCI (PHMB): Brand: TELFA

## (undated) DEVICE — DUAL LUMEN STOMACH TUBE,ANTI-REFLUX VALVE: Brand: SALEM SUMP

## (undated) DEVICE — ENSEAL 20 CM SHAFT, LARGE JAW: Brand: ENSEAL X1

## (undated) DEVICE — TISSUE RETRIEVAL SYSTEM: Brand: INZII RETRIEVAL SYSTEM